# Patient Record
Sex: FEMALE | Race: WHITE | NOT HISPANIC OR LATINO
[De-identification: names, ages, dates, MRNs, and addresses within clinical notes are randomized per-mention and may not be internally consistent; named-entity substitution may affect disease eponyms.]

---

## 2017-01-25 ENCOUNTER — APPOINTMENT (OUTPATIENT)
Dept: GASTROENTEROLOGY | Facility: CLINIC | Age: 82
End: 2017-01-25

## 2017-01-25 VITALS
WEIGHT: 100 LBS | SYSTOLIC BLOOD PRESSURE: 122 MMHG | HEART RATE: 87 BPM | TEMPERATURE: 97.7 F | HEIGHT: 60 IN | OXYGEN SATURATION: 97 % | BODY MASS INDEX: 19.63 KG/M2 | DIASTOLIC BLOOD PRESSURE: 70 MMHG

## 2017-01-25 DIAGNOSIS — R10.30 LOWER ABDOMINAL PAIN, UNSPECIFIED: ICD-10-CM

## 2017-02-28 ENCOUNTER — APPOINTMENT (OUTPATIENT)
Dept: OPHTHALMOLOGY | Facility: CLINIC | Age: 82
End: 2017-02-28

## 2017-03-15 ENCOUNTER — RESULT REVIEW (OUTPATIENT)
Age: 82
End: 2017-03-15

## 2017-03-16 ENCOUNTER — APPOINTMENT (OUTPATIENT)
Dept: OPHTHALMOLOGY | Facility: CLINIC | Age: 82
End: 2017-03-16

## 2017-04-05 ENCOUNTER — APPOINTMENT (OUTPATIENT)
Dept: GASTROENTEROLOGY | Facility: CLINIC | Age: 82
End: 2017-04-05

## 2017-04-24 ENCOUNTER — APPOINTMENT (OUTPATIENT)
Dept: GASTROENTEROLOGY | Facility: CLINIC | Age: 82
End: 2017-04-24

## 2017-04-24 VITALS
OXYGEN SATURATION: 99 % | WEIGHT: 98 LBS | SYSTOLIC BLOOD PRESSURE: 130 MMHG | BODY MASS INDEX: 19.24 KG/M2 | HEART RATE: 78 BPM | DIASTOLIC BLOOD PRESSURE: 78 MMHG | HEIGHT: 60 IN | TEMPERATURE: 98.6 F

## 2017-07-24 ENCOUNTER — APPOINTMENT (OUTPATIENT)
Dept: GASTROENTEROLOGY | Facility: CLINIC | Age: 82
End: 2017-07-24

## 2017-07-24 VITALS
HEIGHT: 60 IN | BODY MASS INDEX: 19.24 KG/M2 | WEIGHT: 98 LBS | OXYGEN SATURATION: 98 % | HEART RATE: 78 BPM | SYSTOLIC BLOOD PRESSURE: 140 MMHG | DIASTOLIC BLOOD PRESSURE: 70 MMHG | TEMPERATURE: 97.8 F

## 2017-07-24 DIAGNOSIS — Z87.19 PERSONAL HISTORY OF OTHER DISEASES OF THE DIGESTIVE SYSTEM: ICD-10-CM

## 2017-07-24 DIAGNOSIS — R19.4 CHANGE IN BOWEL HABIT: ICD-10-CM

## 2017-07-24 RX ORDER — WHEAT DEXTRIN 3 G/4 G
POWDER (GRAM) ORAL
Qty: 1 | Refills: 5 | Status: ACTIVE | OUTPATIENT
Start: 2017-07-24

## 2017-08-14 ENCOUNTER — APPOINTMENT (OUTPATIENT)
Dept: GASTROENTEROLOGY | Facility: CLINIC | Age: 82
End: 2017-08-14

## 2017-08-23 ENCOUNTER — OUTPATIENT (OUTPATIENT)
Dept: OUTPATIENT SERVICES | Facility: HOSPITAL | Age: 82
LOS: 1 days | Discharge: HOME | End: 2017-08-23

## 2017-08-23 ENCOUNTER — APPOINTMENT (OUTPATIENT)
Dept: GASTROENTEROLOGY | Facility: HOSPITAL | Age: 82
End: 2017-08-23
Payer: MEDICARE

## 2017-08-23 DIAGNOSIS — K62.5 HEMORRHAGE OF ANUS AND RECTUM: ICD-10-CM

## 2017-08-23 DIAGNOSIS — H25.019 CORTICAL AGE-RELATED CATARACT, UNSPECIFIED EYE: Chronic | ICD-10-CM

## 2017-08-23 PROCEDURE — 45380 COLONOSCOPY AND BIOPSY: CPT

## 2017-09-07 ENCOUNTER — APPOINTMENT (OUTPATIENT)
Dept: OPHTHALMOLOGY | Facility: CLINIC | Age: 82
End: 2017-09-07
Payer: MEDICARE

## 2017-09-07 PROCEDURE — 92014 COMPRE OPH EXAM EST PT 1/>: CPT

## 2017-09-07 PROCEDURE — 92134 CPTRZ OPH DX IMG PST SGM RTA: CPT

## 2017-09-26 ENCOUNTER — APPOINTMENT (OUTPATIENT)
Dept: OPHTHALMOLOGY | Facility: CLINIC | Age: 82
End: 2017-09-26
Payer: MEDICARE

## 2017-09-26 DIAGNOSIS — H00.11 CHALAZION RIGHT UPPER EYELID: ICD-10-CM

## 2017-09-26 PROCEDURE — 92012 INTRM OPH EXAM EST PATIENT: CPT

## 2017-09-26 RX ORDER — TOBRAMYCIN AND DEXAMETHASONE 3; 1 MG/G; MG/G
0.3-0.1 OINTMENT OPHTHALMIC
Qty: 1 | Refills: 1 | Status: ACTIVE | COMMUNITY
Start: 2017-09-26 | End: 1900-01-01

## 2017-09-26 RX ORDER — NEOMYCIN AND POLYMYXIN B SULFATES AND DEXAMETHASONE 3.5; 10000; 1 MG/G; [IU]/G; MG/G
3.5-10000-0.1 OINTMENT OPHTHALMIC TWICE DAILY
Qty: 1 | Refills: 0 | Status: ACTIVE | COMMUNITY
Start: 2017-09-26 | End: 1900-01-01

## 2017-10-02 ENCOUNTER — APPOINTMENT (OUTPATIENT)
Dept: GASTROENTEROLOGY | Facility: CLINIC | Age: 82
End: 2017-10-02
Payer: MEDICARE

## 2017-10-02 VITALS
HEART RATE: 94 BPM | HEIGHT: 60 IN | TEMPERATURE: 98.5 F | WEIGHT: 100 LBS | SYSTOLIC BLOOD PRESSURE: 158 MMHG | BODY MASS INDEX: 19.63 KG/M2 | DIASTOLIC BLOOD PRESSURE: 82 MMHG | OXYGEN SATURATION: 98 %

## 2017-10-02 DIAGNOSIS — K64.8 OTHER HEMORRHOIDS: ICD-10-CM

## 2017-10-02 DIAGNOSIS — R97.8 OTHER ABNORMAL TUMOR MARKERS: ICD-10-CM

## 2017-10-02 DIAGNOSIS — K62.5 HEMORRHAGE OF ANUS AND RECTUM: ICD-10-CM

## 2017-10-02 PROCEDURE — 99214 OFFICE O/P EST MOD 30 MIN: CPT

## 2018-02-21 ENCOUNTER — OUTPATIENT (OUTPATIENT)
Dept: OUTPATIENT SERVICES | Facility: HOSPITAL | Age: 83
LOS: 1 days | End: 2018-02-21
Payer: MEDICARE

## 2018-02-21 VITALS
HEART RATE: 80 BPM | DIASTOLIC BLOOD PRESSURE: 80 MMHG | RESPIRATION RATE: 16 BRPM | OXYGEN SATURATION: 98 % | WEIGHT: 97 LBS | HEIGHT: 59.5 IN | SYSTOLIC BLOOD PRESSURE: 140 MMHG | TEMPERATURE: 98 F

## 2018-02-21 DIAGNOSIS — I44.7 LEFT BUNDLE-BRANCH BLOCK, UNSPECIFIED: ICD-10-CM

## 2018-02-21 DIAGNOSIS — K62.1 RECTAL POLYP: ICD-10-CM

## 2018-02-21 DIAGNOSIS — H25.019 CORTICAL AGE-RELATED CATARACT, UNSPECIFIED EYE: Chronic | ICD-10-CM

## 2018-02-21 DIAGNOSIS — K62.0 ANAL POLYP: ICD-10-CM

## 2018-02-21 PROCEDURE — 93010 ELECTROCARDIOGRAM REPORT: CPT

## 2018-02-21 NOTE — H&P PST ADULT - PSH
Cataract cortical, senile  s/p extraction right eye 12/13  History of abdominal hysterectomy  1970  History of appendectomy  1939  S/P breast biopsy  Right breast benign cyst removed.  S/P thyroidectomy  1981

## 2018-02-21 NOTE — H&P PST ADULT - GASTROINTESTINAL COMMENTS
"I did have a hemorrhage with my hemorrhoid around January 8th, that stopped but I'm oozing something sometimes it's a little pink"

## 2018-02-21 NOTE — H&P PST ADULT - PROBLEM SELECTOR PLAN 2
Pt. is scheduled to see her Cardiologist 2/23/18.  Pt. given clearance form to give to Cardiologist. Pt. is scheduled to see her Cardiologist 2/23/18.  Pt. given clearance form to give to Cardiologist.  Had office fax over old EKG from 2/2017 for comparison.  Spoke with Dr. Ludwig.  Larissa Crowell at the Cardiologist office that pt's. scheduled visit for 2/23/18 also needs to be a cardiac clearance visit.  Requested Cardiologist fax number to send over EKG from today, 2/21/18.

## 2018-02-21 NOTE — H&P PST ADULT - NSANTHOSAYNRD_GEN_A_CORE
No. LOVE screening performed.  STOP BANG Legend: 0-2 = LOW Risk; 3-4 = INTERMEDIATE Risk; 5-8 = HIGH Risk

## 2018-02-21 NOTE — H&P PST ADULT - ANESTHESIA, PREVIOUS REACTION, PROFILE
none/pt's. 72 yo mother had a stroke in the recovery room after having a kidney tumor removed, she passed away 6 weeks later

## 2018-02-21 NOTE — H&P PST ADULT - PMH
Anemia    Bleeding hemorrhoid    Bundle branch block, left    H/O: hypothyroidism    Hypercholesteremia    Left ovarian cyst    Lupus    Meniere disease    Thyroid ca

## 2018-03-01 ENCOUNTER — OUTPATIENT (OUTPATIENT)
Dept: OUTPATIENT SERVICES | Facility: HOSPITAL | Age: 83
LOS: 1 days | Discharge: HOME | End: 2018-03-01
Payer: MEDICARE

## 2018-03-01 ENCOUNTER — RESULT REVIEW (OUTPATIENT)
Age: 83
End: 2018-03-01

## 2018-03-01 VITALS
TEMPERATURE: 98 F | WEIGHT: 97 LBS | SYSTOLIC BLOOD PRESSURE: 146 MMHG | RESPIRATION RATE: 16 BRPM | OXYGEN SATURATION: 98 % | DIASTOLIC BLOOD PRESSURE: 54 MMHG | HEART RATE: 82 BPM | HEIGHT: 59.5 IN

## 2018-03-01 VITALS
SYSTOLIC BLOOD PRESSURE: 122 MMHG | DIASTOLIC BLOOD PRESSURE: 70 MMHG | OXYGEN SATURATION: 100 % | HEART RATE: 74 BPM | RESPIRATION RATE: 15 BRPM

## 2018-03-01 DIAGNOSIS — H25.019 CORTICAL AGE-RELATED CATARACT, UNSPECIFIED EYE: Chronic | ICD-10-CM

## 2018-03-01 DIAGNOSIS — K62.0 ANAL POLYP: ICD-10-CM

## 2018-03-01 PROCEDURE — 88305 TISSUE EXAM BY PATHOLOGIST: CPT | Mod: 26

## 2018-03-01 RX ORDER — OXYCODONE AND ACETAMINOPHEN 5; 325 MG/1; MG/1
1 TABLET ORAL
Qty: 40 | Refills: 0
Start: 2018-03-01

## 2018-03-01 NOTE — ASU DISCHARGE PLAN (ADULT/PEDIATRIC). - NOTIFY
Pain not relieved by Medications/Fever greater than 101/Persistent Nausea and Vomiting/Bleeding that does not stop

## 2018-03-01 NOTE — BRIEF OPERATIVE NOTE - PROCEDURE
Rectal polypectomy  03/01/2018  Transanal excsion of rectal polyp with excision of rectal mucosa prolapse  Active  DLOMASNEY <<-----Click on this checkbox to enter Procedure

## 2018-03-07 ENCOUNTER — TRANSCRIPTION ENCOUNTER (OUTPATIENT)
Age: 83
End: 2018-03-07

## 2018-04-03 ENCOUNTER — APPOINTMENT (OUTPATIENT)
Dept: OPHTHALMOLOGY | Facility: CLINIC | Age: 83
End: 2018-04-03
Payer: MEDICARE

## 2018-04-03 DIAGNOSIS — Z79.899 OTHER LONG TERM (CURRENT) DRUG THERAPY: ICD-10-CM

## 2018-04-03 PROCEDURE — 92134 CPTRZ OPH DX IMG PST SGM RTA: CPT

## 2018-04-03 PROCEDURE — 92014 COMPRE OPH EXAM EST PT 1/>: CPT

## 2018-06-18 ENCOUNTER — APPOINTMENT (OUTPATIENT)
Dept: OPHTHALMOLOGY | Facility: CLINIC | Age: 83
End: 2018-06-18
Payer: MEDICARE

## 2018-06-18 PROCEDURE — 92060 SENSORIMOTOR EXAMINATION: CPT

## 2018-06-18 PROCEDURE — 92012 INTRM OPH EXAM EST PATIENT: CPT

## 2018-09-07 PROBLEM — K64.9 UNSPECIFIED HEMORRHOIDS: Chronic | Status: ACTIVE | Noted: 2018-02-21

## 2018-09-07 PROBLEM — N83.202 UNSPECIFIED OVARIAN CYST, LEFT SIDE: Chronic | Status: ACTIVE | Noted: 2018-02-21

## 2018-09-26 ENCOUNTER — APPOINTMENT (OUTPATIENT)
Dept: GASTROENTEROLOGY | Facility: CLINIC | Age: 83
End: 2018-09-26
Payer: MEDICARE

## 2018-09-26 VITALS
HEART RATE: 86 BPM | OXYGEN SATURATION: 97 % | SYSTOLIC BLOOD PRESSURE: 122 MMHG | TEMPERATURE: 98 F | HEIGHT: 60 IN | RESPIRATION RATE: 17 BRPM | DIASTOLIC BLOOD PRESSURE: 83 MMHG | WEIGHT: 97 LBS | BODY MASS INDEX: 19.04 KG/M2

## 2018-09-26 DIAGNOSIS — K29.50 UNSPECIFIED CHRONIC GASTRITIS W/OUT BLEEDING: ICD-10-CM

## 2018-09-26 PROCEDURE — 99214 OFFICE O/P EST MOD 30 MIN: CPT

## 2018-09-26 RX ORDER — ALUMINUM HYDROXIDE AND MAGNESIUM TRISILICATE 80; 14.2 MG/1; MG/1
80-14.2 TABLET, CHEWABLE ORAL
Qty: 30 | Refills: 1 | Status: ACTIVE | OUTPATIENT
Start: 2018-09-26

## 2019-03-05 ENCOUNTER — RECORD ABSTRACTING (OUTPATIENT)
Age: 84
End: 2019-03-05

## 2019-03-05 ENCOUNTER — APPOINTMENT (OUTPATIENT)
Dept: SURGICAL ONCOLOGY | Facility: CLINIC | Age: 84
End: 2019-03-05
Payer: MEDICARE

## 2019-03-05 VITALS
SYSTOLIC BLOOD PRESSURE: 144 MMHG | BODY MASS INDEX: 19.44 KG/M2 | WEIGHT: 99 LBS | HEART RATE: 76 BPM | OXYGEN SATURATION: 97 % | HEIGHT: 60 IN | DIASTOLIC BLOOD PRESSURE: 82 MMHG | RESPIRATION RATE: 16 BRPM

## 2019-03-05 DIAGNOSIS — Z85.828 PERSONAL HISTORY OF OTHER MALIGNANT NEOPLASM OF SKIN: ICD-10-CM

## 2019-03-05 DIAGNOSIS — Z86.79 PERSONAL HISTORY OF OTHER DISEASES OF THE CIRCULATORY SYSTEM: ICD-10-CM

## 2019-03-05 DIAGNOSIS — Z87.19 PERSONAL HISTORY OF OTHER DISEASES OF THE DIGESTIVE SYSTEM: ICD-10-CM

## 2019-03-05 DIAGNOSIS — R92.8 OTHER ABNORMAL AND INCONCLUSIVE FINDINGS ON DIAGNOSTIC IMAGING OF BREAST: ICD-10-CM

## 2019-03-05 PROCEDURE — 99203 OFFICE O/P NEW LOW 30 MIN: CPT

## 2019-03-05 NOTE — HISTORY OF PRESENT ILLNESS
[de-identified] : 84 y/o female patient presents for a follow up visit due to findings on mammogram. She does have pain on her breasts due to pressure. She denies any nipple discharge.\par FMHx of Breast CA from maternal aunt.\par PMHx of RT bundle branch block fluid around the heart, skin cancers, cancer thyroidectomy, lupus, and two ovary cysts. In addition to HTN which is controlled with medication and is in remission with lupus. \par \par \par MMG 19: Skin retraction LT nipple region 4:00 location on clinical exam with normal sonogram. 1.2 cm echogenic mass in the LT breast 8:00 location 2 cm from the nipple suggestive of area of fat necrosis. Recommend 6 month f/u sonogram. Further management of the skin changes in the LT breast to be dependent on clincal exam. Probably benign. \par \par Allergies: Penicillin, Aspirin, Shrimp

## 2019-03-05 NOTE — ASSESSMENT
[FreeTextEntry1] : imp:\par Sonographic abnormality\par No palpable mass found on PE\par \par plan:\par Will have films reviewed regarding 6 month f/u recommendation versus biopsy

## 2019-03-05 NOTE — PHYSICAL EXAM
[Normal] : supple, no neck mass and thyroid not enlarged [Normal Neck Lymph Nodes] : normal neck lymph nodes  [Normal Groin Lymph Nodes] : normal groin lymph nodes [Normal] : oriented to person, place and time, with appropriate affect [FreeTextEntry1] : I, James Mccloud was present for the physical exam\par \par \par \par  [de-identified] : Normal S1, S2. Regular rate and rhythm\par \par  [de-identified] : No suspicious lesions identified at 8:00 LT breast 2 cm from the nipple. [de-identified] : Clear breath sounds bilaterally, normal respiratory effort\par \par

## 2019-03-05 NOTE — CONSULT LETTER
[Dear  ___] : Dear  [unfilled], [Consult Letter:] : I had the pleasure of evaluating your patient, [unfilled]. [Please see my note below.] : Please see my note below. [Consult Closing:] : Thank you very much for allowing me to participate in the care of this patient.  If you have any questions, please do not hesitate to contact me. [Sincerely,] : Sincerely, [FreeTextEntry2] : [facility], [address]\par [place] [id number] [FreeTextEntry3] : Rojas Lopez M.D.\par \par \par \par

## 2019-04-04 ENCOUNTER — APPOINTMENT (OUTPATIENT)
Dept: OPHTHALMOLOGY | Facility: CLINIC | Age: 84
End: 2019-04-04

## 2019-04-09 ENCOUNTER — APPOINTMENT (OUTPATIENT)
Dept: OPHTHALMOLOGY | Facility: CLINIC | Age: 84
End: 2019-04-09
Payer: MEDICARE

## 2019-04-09 DIAGNOSIS — H35.40 UNSPECIFIED PERIPHERAL RETINAL DEGENERATION: ICD-10-CM

## 2019-04-09 DIAGNOSIS — H44.23 DEGENERATIVE MYOPIA, BILATERAL: ICD-10-CM

## 2019-04-09 PROCEDURE — 92226: CPT | Mod: LT

## 2019-04-09 PROCEDURE — 92014 COMPRE OPH EXAM EST PT 1/>: CPT

## 2019-04-09 PROCEDURE — 92134 CPTRZ OPH DX IMG PST SGM RTA: CPT

## 2019-10-07 ENCOUNTER — APPOINTMENT (OUTPATIENT)
Dept: GASTROENTEROLOGY | Facility: CLINIC | Age: 84
End: 2019-10-07
Payer: MEDICARE

## 2019-10-07 VITALS
DIASTOLIC BLOOD PRESSURE: 70 MMHG | BODY MASS INDEX: 18.46 KG/M2 | TEMPERATURE: 98.6 F | SYSTOLIC BLOOD PRESSURE: 120 MMHG | HEIGHT: 60 IN | HEART RATE: 82 BPM | WEIGHT: 94 LBS | OXYGEN SATURATION: 98 %

## 2019-10-07 DIAGNOSIS — R05 COUGH: ICD-10-CM

## 2019-10-07 PROCEDURE — 99214 OFFICE O/P EST MOD 30 MIN: CPT

## 2019-10-07 RX ORDER — PANTOPRAZOLE 40 MG/1
40 TABLET, DELAYED RELEASE ORAL
Qty: 30 | Refills: 1 | Status: ACTIVE | COMMUNITY
Start: 2017-04-24

## 2019-10-07 RX ORDER — METOCLOPRAMIDE 10 MG/1
10 TABLET ORAL
Qty: 15 | Refills: 0 | Status: DISCONTINUED | COMMUNITY
Start: 2018-09-26 | End: 2019-10-07

## 2019-10-07 RX ORDER — DEXLANSOPRAZOLE 60 MG/1
60 CAPSULE, DELAYED RELEASE ORAL
Qty: 30 | Refills: 0 | Status: DISCONTINUED | COMMUNITY
Start: 2018-04-04 | End: 2019-10-07

## 2019-10-07 NOTE — HISTORY OF PRESENT ILLNESS
[FreeTextEntry1] : Patient continues to complain of a nocturnal cough. This is associated with some burning in the throat. It seems to be relieved by Mylanta at night. There is no associated heartburn or regurgitation. She denies any dysphagia. She is on pantoprazole in AM and off H2 blockers. Patient does have a history of gastric polyps some of which have been removed and will have been benign.\par

## 2019-10-07 NOTE — ASSESSMENT
[FreeTextEntry1] : Patient with benign gastric polyps. She does have nocturnal cough frequently. She will change present to present from taking it in the morning to taking it at night.\par An abdominal sonogram will also be ordered.

## 2019-10-07 NOTE — PHYSICAL EXAM
[General Appearance - In No Acute Distress] : in no acute distress [General Appearance - Alert] : alert [PERRL With Normal Accommodation] : pupils were equal in size, round, and reactive to light [Sclera] : the sclera and conjunctiva were normal [Extraocular Movements] : extraocular movements were intact [Outer Ear] : the ears and nose were normal in appearance [Oropharynx] : the oropharynx was normal [Neck Appearance] : the appearance of the neck was normal [Neck Cervical Mass (___cm)] : no neck mass was observed [Jugular Venous Distention Increased] : there was no jugular-venous distention [Thyroid Nodule] : there were no palpable thyroid nodules [Thyroid Diffuse Enlargement] : the thyroid was not enlarged [Auscultation Breath Sounds / Voice Sounds] : lungs were clear to auscultation bilaterally [Heart Rate And Rhythm] : heart rate was normal and rhythm regular [Heart Sounds Gallop] : no gallops [Heart Sounds] : normal S1 and S2 [Murmurs] : no murmurs [Heart Sounds Pericardial Friction Rub] : no pericardial rub [Bowel Sounds] : normal bowel sounds [Abdomen Soft] : soft [Abdomen Tenderness] : non-tender [Abdomen Mass (___ Cm)] : no abdominal mass palpated [] : no hepato-splenomegaly [No CVA Tenderness] : no ~M costovertebral angle tenderness [Nail Clubbing] : no clubbing  or cyanosis of the fingernails [Abnormal Walk] : normal gait [No Spinal Tenderness] : no spinal tenderness [Motor Tone] : muscle strength and tone were normal [Musculoskeletal - Swelling] : no joint swelling seen [Oriented To Time, Place, And Person] : oriented to person, place, and time [Impaired Insight] : insight and judgment were intact [Affect] : the affect was normal

## 2019-11-06 ENCOUNTER — APPOINTMENT (OUTPATIENT)
Dept: OPHTHALMOLOGY | Facility: CLINIC | Age: 84
End: 2019-11-06

## 2019-12-11 ENCOUNTER — APPOINTMENT (OUTPATIENT)
Dept: OPHTHALMOLOGY | Facility: CLINIC | Age: 84
End: 2019-12-11

## 2020-03-09 ENCOUNTER — APPOINTMENT (OUTPATIENT)
Dept: GASTROENTEROLOGY | Facility: CLINIC | Age: 85
End: 2020-03-09

## 2020-11-23 ENCOUNTER — APPOINTMENT (OUTPATIENT)
Dept: GASTROENTEROLOGY | Facility: CLINIC | Age: 85
End: 2020-11-23
Payer: MEDICARE

## 2020-11-23 VITALS
DIASTOLIC BLOOD PRESSURE: 80 MMHG | OXYGEN SATURATION: 98 % | HEART RATE: 78 BPM | BODY MASS INDEX: 18.06 KG/M2 | WEIGHT: 92 LBS | SYSTOLIC BLOOD PRESSURE: 130 MMHG | TEMPERATURE: 98.1 F | HEIGHT: 60 IN

## 2020-11-23 DIAGNOSIS — K31.7 POLYP OF STOMACH AND DUODENUM: ICD-10-CM

## 2020-11-23 DIAGNOSIS — Z87.19 PERSONAL HISTORY OF OTHER DISEASES OF THE DIGESTIVE SYSTEM: ICD-10-CM

## 2020-11-23 DIAGNOSIS — K21.9 GASTRO-ESOPHAGEAL REFLUX DISEASE W/OUT ESOPHAGITIS: ICD-10-CM

## 2020-11-23 PROCEDURE — 99214 OFFICE O/P EST MOD 30 MIN: CPT

## 2020-11-23 RX ORDER — DOXYCYCLINE HYCLATE 50 MG/1
50 CAPSULE ORAL
Qty: 30 | Refills: 0 | Status: ACTIVE | COMMUNITY
Start: 2020-11-11

## 2020-11-23 RX ORDER — NEOMYCIN SULFATE, POLYMYXIN B SULFATE AND DEXAMETHASONE 3.5; 10000; 1 MG/ML; [USP'U]/ML; MG/ML
3.5-10000-0.1 SUSPENSION OPHTHALMIC
Qty: 5 | Refills: 0 | Status: ACTIVE | COMMUNITY
Start: 2020-09-10

## 2020-11-23 NOTE — ASSESSMENT
[FreeTextEntry1] : Patient with history of gastric polyps.  Several were removed.  These were mostly hyperplastic and inflammatory polyps with some intestinal metaplasia.  She did have a polyp that was biopsied in 2015 just below the GE junction there was a hyperplastic polyp with some intestinal metaplasia.  Patient has no symptoms of dysphagia.  Her weight is stable.  Her blood work is good.\par She continues to have burning in her throat which mostly occurs at night and is relieved with Mylanta which she will continue.

## 2020-11-23 NOTE — HISTORY OF PRESENT ILLNESS
[FreeTextEntry1] : Patient continues to complain of a nocturnal burning in throat. It seems to be relieved by Mylanta at night. There is no associated heartburn or regurgitation. She denies any dysphagia. She is on pantoprazole in AM and off H2 blockers. Takes Carafate 2-3 times per day.\par Patient does have a history of gastric polyps some of which have been removed and  have been benign hyperplastic and inflammatory polyps..\par

## 2021-01-14 ENCOUNTER — NON-APPOINTMENT (OUTPATIENT)
Age: 86
End: 2021-01-14

## 2021-02-03 ENCOUNTER — APPOINTMENT (OUTPATIENT)
Dept: GASTROENTEROLOGY | Facility: CLINIC | Age: 86
End: 2021-02-03

## 2021-04-13 ENCOUNTER — APPOINTMENT (OUTPATIENT)
Dept: SURGICAL ONCOLOGY | Facility: CLINIC | Age: 86
End: 2021-04-13

## 2022-04-20 ENCOUNTER — APPOINTMENT (OUTPATIENT)
Dept: GASTROENTEROLOGY | Facility: CLINIC | Age: 87
End: 2022-04-20
Payer: MEDICARE

## 2022-04-20 VITALS
HEIGHT: 61 IN | DIASTOLIC BLOOD PRESSURE: 85 MMHG | WEIGHT: 95 LBS | BODY MASS INDEX: 17.94 KG/M2 | HEART RATE: 69 BPM | OXYGEN SATURATION: 100 % | SYSTOLIC BLOOD PRESSURE: 137 MMHG

## 2022-04-20 DIAGNOSIS — R10.30 LOWER ABDOMINAL PAIN, UNSPECIFIED: ICD-10-CM

## 2022-04-20 PROCEDURE — 99204 OFFICE O/P NEW MOD 45 MIN: CPT

## 2022-04-20 NOTE — PHYSICAL EXAM
[General Appearance - Alert] : alert [General Appearance - In No Acute Distress] : in no acute distress [Sclera] : the sclera and conjunctiva were normal [Outer Ear] : the ears and nose were normal in appearance [Neck Appearance] : the appearance of the neck was normal [Respiration, Rhythm And Depth] : normal respiratory rhythm and effort [Auscultation Breath Sounds / Voice Sounds] : lungs were clear to auscultation bilaterally [Apical Impulse] : the apical impulse was normal [Heart Rate And Rhythm] : heart rate was normal and rhythm regular [Heart Sounds] : normal S1 and S2 [Heart Sounds Gallop] : no gallops [Murmurs] : no murmurs [Bowel Sounds] : normal bowel sounds [Abdomen Soft] : soft [Abdomen Tenderness] : non-tender [] : no hepato-splenomegaly [Abdomen Mass (___ Cm)] : no abdominal mass palpated [FreeTextEntry1] : well healed midline and LLQ surgical scars [Involuntary Movements] : no involuntary movements were seen [Skin Color & Pigmentation] : normal skin color and pigmentation [No Focal Deficits] : no focal deficits [Impaired Insight] : insight and judgment were intact [Oriented To Time, Place, And Person] : oriented to person, place, and time [Affect] : the affect was normal

## 2022-04-20 NOTE — ASSESSMENT
[FreeTextEntry1] : ANISA LUCIANO is a 86 year old female with a history of GERD, gastric polyps, thyroid cancer s/p resection, lupus with pericardial effusions currently in remission, chronic pancreatitis/pancreatic insufficiency, appendectomy, hysterectomy for fibroids here for evaluation of pancreatic insufficiency and abdominal pain\par \par #Lower abdominal discomfort x several months - nonspecific symptoms, not related to food or BMs, not likely to be related to pancreatic insufficiency \par # Pancreatic insufficiency (by report) with evidence of chronic pancreatitis on previous MRI and EUS, off PERT (do not know dose)\par # <5mm simple appearing pancreatic cysts, possibly side branch IPMNs vs sequelae of chronic pancreatitis\par \par Recs:\par - discussed that for history of EPI, will plan for a formal diagnostic study with fecal elastase (as well as evaluate basic bloodwork, r/o Celiac, r/o IBD) to determine how severe her insufficiency may be\par - as Creon appears to be causing her side effects will hold of on restarting PERT until labwork back\par - last imaging of pancreas was 2/21 - will obtain CT abd/pelvis pancreas protocol to evaluate both pancreatic cysts as well as lower abdomen and pelvis in order to workup this discomfort\par - patient does not want to try Levsin/other antispasmodic for symptom relief - prefers to stick with Tylenol and heating pads\par - RTO in 4-6 weeks and then pending above data will present at cyst clinic as needed, etc

## 2022-04-27 ENCOUNTER — NON-APPOINTMENT (OUTPATIENT)
Age: 87
End: 2022-04-27

## 2022-04-29 LAB
ALBUMIN SERPL ELPH-MCNC: 4.9 G/DL
ALP BLD-CCNC: 65 U/L
ALT SERPL-CCNC: 16 U/L
AMYLASE/CREAT SERPL: 186 U/L
ANION GAP SERPL CALC-SCNC: 13 MMOL/L
AST SERPL-CCNC: 18 U/L
BASOPHILS # BLD AUTO: 0.03 K/UL
BASOPHILS NFR BLD AUTO: 0.5 %
BILIRUB SERPL-MCNC: 0.3 MG/DL
BUN SERPL-MCNC: 22 MG/DL
CALCIUM SERPL-MCNC: 9.8 MG/DL
CALPROTECTIN FECAL: 30 UG/G
CANCER AG19-9 SERPL-ACNC: 38 U/ML
CEA SERPL-MCNC: 3.9 NG/ML
CHLORIDE SERPL-SCNC: 99 MMOL/L
CO2 SERPL-SCNC: 28 MMOL/L
CREAT SERPL-MCNC: 0.72 MG/DL
EGFR: 82 ML/MIN/1.73M2
EOSINOPHIL # BLD AUTO: 0.07 K/UL
EOSINOPHIL NFR BLD AUTO: 1.1 %
GLUCOSE SERPL-MCNC: 95 MG/DL
HCT VFR BLD CALC: 45.1 %
HGB BLD-MCNC: 14.6 G/DL
IGA SER QL IEP: 165 MG/DL
IMM GRANULOCYTES NFR BLD AUTO: 0.3 %
INR PPP: 1.03 RATIO
LPL SERPL-CCNC: 71 U/L
LYMPHOCYTES # BLD AUTO: 0.86 K/UL
LYMPHOCYTES NFR BLD AUTO: 13.2 %
MAN DIFF?: NORMAL
MCHC RBC-ENTMCNC: 29.3 PG
MCHC RBC-ENTMCNC: 32.4 GM/DL
MCV RBC AUTO: 90.6 FL
MONOCYTES # BLD AUTO: 0.5 K/UL
MONOCYTES NFR BLD AUTO: 7.7 %
NEUTROPHILS # BLD AUTO: 5.02 K/UL
NEUTROPHILS NFR BLD AUTO: 77.2 %
PANCREATIC ELASTASE, FECAL: 142
PLATELET # BLD AUTO: 244 K/UL
POTASSIUM SERPL-SCNC: 3.9 MMOL/L
PROT SERPL-MCNC: 7.9 G/DL
PT BLD: 12 SEC
RBC # BLD: 4.98 M/UL
RBC # FLD: 14.4 %
SODIUM SERPL-SCNC: 141 MMOL/L
TTG IGA SER IA-ACNC: <1.2 U/ML
TTG IGA SER-ACNC: NEGATIVE
TTG IGG SER IA-ACNC: 5.7 U/ML
TTG IGG SER IA-ACNC: NEGATIVE
WBC # FLD AUTO: 6.5 K/UL

## 2022-05-09 ENCOUNTER — APPOINTMENT (OUTPATIENT)
Dept: GASTROENTEROLOGY | Facility: CLINIC | Age: 87
End: 2022-05-09

## 2022-05-17 ENCOUNTER — APPOINTMENT (OUTPATIENT)
Dept: CT IMAGING | Facility: CLINIC | Age: 87
End: 2022-05-17

## 2022-05-25 ENCOUNTER — NON-APPOINTMENT (OUTPATIENT)
Age: 87
End: 2022-05-25

## 2022-06-08 ENCOUNTER — APPOINTMENT (OUTPATIENT)
Dept: GASTROENTEROLOGY | Facility: CLINIC | Age: 87
End: 2022-06-08
Payer: MEDICARE

## 2022-06-08 VITALS
OXYGEN SATURATION: 98 % | BODY MASS INDEX: 17.37 KG/M2 | WEIGHT: 92 LBS | SYSTOLIC BLOOD PRESSURE: 123 MMHG | HEIGHT: 61 IN | HEART RATE: 83 BPM | DIASTOLIC BLOOD PRESSURE: 79 MMHG

## 2022-06-08 DIAGNOSIS — R10.9 UNSPECIFIED ABDOMINAL PAIN: ICD-10-CM

## 2022-06-08 DIAGNOSIS — K86.89 OTHER SPECIFIED DISEASES OF PANCREAS: ICD-10-CM

## 2022-06-08 PROCEDURE — 99214 OFFICE O/P EST MOD 30 MIN: CPT

## 2022-06-08 NOTE — ASSESSMENT
[FreeTextEntry1] : \par ANISA LUCIANO is a 86 year old female with a history of GERD, gastric polyps, thyroid cancer s/p resection, lupus with pericardial effusions currently in remission, chronic pancreatitis/pancreatic insufficiency, appendectomy, hysterectomy for fibroids here for followup\par \par #Lower abdominal/pelvic pain - unclear cause, CT negative, went to GYN who did not find etiology on pelvic exam\par # CT with ileal bone lesions - question MM?\par # <5mm pancreatic cyst\par #  Pancreatic insufficiency now on Creon two tabs/ dinner\par \par Recs:\par - discussed referral to hematology for eval for MM just to be safe, patient would like to proceed \par - Continue Creon with dinner and advised that if she has a substantial meal or snack before dinner to take 1 tab with that as well\par - Advised trying to increase calories with protein supplements or shakes (like Glucerna or Boost)\par - If cannot fill Bentyl will try to find other antispasmodic for PRN abdominopelvic pain; also advised heating pads as needed\par - follow up with me at end of year, will discuss repeat imaging need for cyst surveillance (1 year imaging) if patient interested in continuing to be surveilled

## 2022-06-08 NOTE — PHYSICAL EXAM
[General Appearance - Alert] : alert [General Appearance - In No Acute Distress] : in no acute distress [Sclera] : the sclera and conjunctiva were normal [Outer Ear] : the ears and nose were normal in appearance [Neck Appearance] : the appearance of the neck was normal [Respiration, Rhythm And Depth] : normal respiratory rhythm and effort [Auscultation Breath Sounds / Voice Sounds] : lungs were clear to auscultation bilaterally [Apical Impulse] : the apical impulse was normal [Heart Rate And Rhythm] : heart rate was normal and rhythm regular [Heart Sounds] : normal S1 and S2 [Heart Sounds Gallop] : no gallops [Murmurs] : no murmurs [Bowel Sounds] : normal bowel sounds [Abdomen Soft] : soft [Abdomen Tenderness] : non-tender [] : no hepato-splenomegaly [Abdomen Mass (___ Cm)] : no abdominal mass palpated [FreeTextEntry1] : well healed midline and LLQ surgical scars [Involuntary Movements] : no involuntary movements were seen [Skin Color & Pigmentation] : normal skin color and pigmentation [No Focal Deficits] : no focal deficits [Oriented To Time, Place, And Person] : oriented to person, place, and time [Impaired Insight] : insight and judgment were intact [Affect] : the affect was normal

## 2022-06-08 NOTE — HISTORY OF PRESENT ILLNESS
[de-identified] : ANISA LUCIANO is a 86 year old female with a history of GERD, gastric polyps, thyroid cancer s/p resection, lupus with pericardial effusions currently in remission, chronic pancreatitis/pancreatic insufficiency, appendectomy, hysterectomy for fibroids\par \par \par She presents today for followup\par Since last visit she underwent panc protocol CT that did not show any concerning intraabdominal or pancreatic pathology (aside from small known cyst).  Katia lesions mentioned but reviewed with radiologist who felt this might be an overcall\par Initially discussed with patient who deferred referral to hematology but now wonders if her lower abdominal/pelvic pain may be caused by these lesions \par \par She also restarted taking Creon after our discussion\par 2 pills of 36k lipase units with dinner\par Does not eat much during the day otherwise \par \par She was not able to  Bentyl bc of insurance issue\par She is having normal stools\par Her weight is stable but still not going up significantly\par \par Her  is coming home from rehab tomorrow and she is nervous about caring for him but glad to be home \par \par

## 2022-06-14 ENCOUNTER — OUTPATIENT (OUTPATIENT)
Dept: OUTPATIENT SERVICES | Facility: HOSPITAL | Age: 87
LOS: 1 days | Discharge: HOME | End: 2022-06-14

## 2022-06-14 DIAGNOSIS — H25.019 CORTICAL AGE-RELATED CATARACT, UNSPECIFIED EYE: Chronic | ICD-10-CM

## 2022-06-14 DIAGNOSIS — C90.01 MULTIPLE MYELOMA IN REMISSION: ICD-10-CM

## 2022-06-20 ENCOUNTER — RESULT REVIEW (OUTPATIENT)
Age: 87
End: 2022-06-20

## 2022-06-20 ENCOUNTER — APPOINTMENT (OUTPATIENT)
Dept: HEMATOLOGY ONCOLOGY | Facility: CLINIC | Age: 87
End: 2022-06-20
Payer: MEDICARE

## 2022-06-20 ENCOUNTER — LABORATORY RESULT (OUTPATIENT)
Age: 87
End: 2022-06-20

## 2022-06-20 VITALS
HEIGHT: 61 IN | SYSTOLIC BLOOD PRESSURE: 157 MMHG | RESPIRATION RATE: 16 BRPM | WEIGHT: 92.57 LBS | TEMPERATURE: 96.4 F | HEART RATE: 88 BPM | BODY MASS INDEX: 17.48 KG/M2 | OXYGEN SATURATION: 99 % | DIASTOLIC BLOOD PRESSURE: 82 MMHG

## 2022-06-20 DIAGNOSIS — Z72.89 OTHER PROBLEMS RELATED TO LIFESTYLE: ICD-10-CM

## 2022-06-20 DIAGNOSIS — C73 MALIGNANT NEOPLASM OF THYROID GLAND: ICD-10-CM

## 2022-06-20 DIAGNOSIS — M89.9 DISORDER OF BONE, UNSPECIFIED: ICD-10-CM

## 2022-06-20 DIAGNOSIS — Z83.3 FAMILY HISTORY OF DIABETES MELLITUS: ICD-10-CM

## 2022-06-20 DIAGNOSIS — M32.9 SYSTEMIC LUPUS ERYTHEMATOSUS, UNSPECIFIED: ICD-10-CM

## 2022-06-20 DIAGNOSIS — Z80.3 FAMILY HISTORY OF MALIGNANT NEOPLASM OF BREAST: ICD-10-CM

## 2022-06-20 DIAGNOSIS — E89.0 POSTPROCEDURAL HYPOTHYROIDISM: ICD-10-CM

## 2022-06-20 LAB
BASOPHILS # BLD AUTO: 0.03 K/UL — SIGNIFICANT CHANGE UP (ref 0–0.2)
BASOPHILS NFR BLD AUTO: 0.4 % — SIGNIFICANT CHANGE UP (ref 0–2)
EOSINOPHIL # BLD AUTO: 0.11 K/UL — SIGNIFICANT CHANGE UP (ref 0–0.5)
EOSINOPHIL NFR BLD AUTO: 1.6 % — SIGNIFICANT CHANGE UP (ref 0–6)
HCT VFR BLD CALC: 41.5 % — SIGNIFICANT CHANGE UP (ref 34.5–45)
HGB BLD-MCNC: 13.9 G/DL — SIGNIFICANT CHANGE UP (ref 11.5–15.5)
IMM GRANULOCYTES NFR BLD AUTO: 0.3 % — SIGNIFICANT CHANGE UP (ref 0–1.5)
LYMPHOCYTES # BLD AUTO: 1.17 K/UL — SIGNIFICANT CHANGE UP (ref 1–3.3)
LYMPHOCYTES # BLD AUTO: 17.4 % — SIGNIFICANT CHANGE UP (ref 13–44)
MCHC RBC-ENTMCNC: 29.5 PG — SIGNIFICANT CHANGE UP (ref 27–34)
MCHC RBC-ENTMCNC: 33.5 G/DL — SIGNIFICANT CHANGE UP (ref 32–36)
MCV RBC AUTO: 88.1 FL — SIGNIFICANT CHANGE UP (ref 80–100)
MONOCYTES # BLD AUTO: 0.67 K/UL — SIGNIFICANT CHANGE UP (ref 0–0.9)
MONOCYTES NFR BLD AUTO: 10 % — SIGNIFICANT CHANGE UP (ref 2–14)
NEUTROPHILS # BLD AUTO: 4.73 K/UL — SIGNIFICANT CHANGE UP (ref 1.8–7.4)
NEUTROPHILS NFR BLD AUTO: 70.3 % — SIGNIFICANT CHANGE UP (ref 43–77)
NRBC # BLD: 0 /100 WBCS — SIGNIFICANT CHANGE UP (ref 0–0)
PLATELET # BLD AUTO: 219 K/UL — SIGNIFICANT CHANGE UP (ref 150–400)
RBC # BLD: 4.71 M/UL — SIGNIFICANT CHANGE UP (ref 3.8–5.2)
RBC # FLD: 14.4 % — SIGNIFICANT CHANGE UP (ref 10.3–14.5)
WBC # BLD: 6.73 K/UL — SIGNIFICANT CHANGE UP (ref 3.8–10.5)
WBC # FLD AUTO: 6.73 K/UL — SIGNIFICANT CHANGE UP (ref 3.8–10.5)

## 2022-06-20 PROCEDURE — 99205 OFFICE O/P NEW HI 60 MIN: CPT

## 2022-06-20 RX ORDER — ROSUVASTATIN CALCIUM 5 MG/1
5 TABLET, FILM COATED ORAL
Qty: 90 | Refills: 0 | Status: DISCONTINUED | COMMUNITY
Start: 2020-09-21 | End: 2022-06-20

## 2022-06-20 RX ORDER — SIMVASTATIN 80 MG/1
TABLET, FILM COATED ORAL
Refills: 0 | Status: DISCONTINUED | COMMUNITY
End: 2022-06-20

## 2022-06-20 RX ORDER — CONJUGATED ESTROGENS 0.62 MG/G
0.62 CREAM VAGINAL
Qty: 30 | Refills: 0 | Status: COMPLETED | COMMUNITY
Start: 2022-05-19

## 2022-06-20 RX ORDER — PANCRELIPASE 36000; 180000; 114000 [USP'U]/1; [USP'U]/1; [USP'U]/1
36000-114000 CAPSULE, DELAYED RELEASE PELLETS ORAL
Qty: 100 | Refills: 0 | Status: ACTIVE | COMMUNITY
Start: 2022-06-08

## 2022-06-20 RX ORDER — DICYCLOMINE HYDROCHLORIDE 20 MG/1
20 TABLET ORAL EVERY 6 HOURS
Qty: 120 | Refills: 6 | Status: DISCONTINUED | COMMUNITY
Start: 2022-05-25 | End: 2022-06-20

## 2022-06-20 RX ORDER — OXYCODONE AND ACETAMINOPHEN 5; 325 MG/1; MG/1
5-325 TABLET ORAL
Qty: 40 | Refills: 0 | Status: DISCONTINUED | COMMUNITY
Start: 2018-03-01 | End: 2022-06-20

## 2022-06-20 RX ORDER — POTASSIUM CHLORIDE 1500 MG/1
20 TABLET, EXTENDED RELEASE ORAL
Qty: 90 | Refills: 0 | Status: DISCONTINUED | COMMUNITY
Start: 2020-05-24 | End: 2022-06-20

## 2022-06-20 RX ORDER — FAMOTIDINE 20 MG/1
20 TABLET, FILM COATED ORAL
Qty: 180 | Refills: 0 | Status: ACTIVE | COMMUNITY
Start: 2022-06-04

## 2022-06-23 LAB
ALBUMIN MFR SERPL ELPH: 58.8 %
ALBUMIN SERPL ELPH-MCNC: 4.8 G/DL
ALBUMIN SERPL-MCNC: 4.4 G/DL
ALBUMIN/GLOB SERPL: 1.5 RATIO
ALP BLD-CCNC: 59 U/L
ALPHA1 GLOB MFR SERPL ELPH: 3.7 %
ALPHA1 GLOB SERPL ELPH-MCNC: 0.3 G/DL
ALPHA2 GLOB MFR SERPL ELPH: 10.7 %
ALPHA2 GLOB SERPL ELPH-MCNC: 0.8 G/DL
ALT SERPL-CCNC: 16 U/L
ANION GAP SERPL CALC-SCNC: 14 MMOL/L
APPEARANCE: CLEAR
AST SERPL-CCNC: 19 U/L
B-GLOBULIN MFR SERPL ELPH: 11.4 %
B-GLOBULIN SERPL ELPH-MCNC: 0.8 G/DL
BILIRUB SERPL-MCNC: 0.3 MG/DL
BILIRUBIN URINE: NEGATIVE
BLOOD URINE: NEGATIVE
BUN SERPL-MCNC: 20 MG/DL
CALCIUM SERPL-MCNC: 9.5 MG/DL
CHLORIDE SERPL-SCNC: 100 MMOL/L
CO2 SERPL-SCNC: 28 MMOL/L
COLOR: COLORLESS
CREAT SERPL-MCNC: 0.78 MG/DL
DEPRECATED KAPPA LC FREE/LAMBDA SER: 1.92 RATIO
EGFR: 74 ML/MIN/1.73M2
GAMMA GLOB FLD ELPH-MCNC: 1.1 G/DL
GAMMA GLOB MFR SERPL ELPH: 15.4 %
GLUCOSE QUALITATIVE U: NEGATIVE
GLUCOSE SERPL-MCNC: 111 MG/DL
IGA SER QL IEP: 172 MG/DL
IGG SER QL IEP: 1151 MG/DL
IGM SER QL IEP: 71 MG/DL
INTERPRETATION SERPL IEP-IMP: NORMAL
KAPPA LC CSF-MCNC: 1.4 MG/DL
KAPPA LC SERPL-MCNC: 2.69 MG/DL
KETONES URINE: NORMAL
LDH SERPL-CCNC: 210 U/L
LEUKOCYTE ESTERASE URINE: NEGATIVE
M PROTEIN MFR SERPL ELPH: 3.9 %
M PROTEIN SPEC IFE-MCNC: NORMAL
MONOCLON BAND OBS SERPL: 0.3 G/DL
NITRITE URINE: NEGATIVE
PH URINE: 7
POTASSIUM SERPL-SCNC: 3.3 MMOL/L
PROT SERPL-MCNC: 7.4 G/DL
PROTEIN URINE: NEGATIVE
SODIUM SERPL-SCNC: 142 MMOL/L
SPECIFIC GRAVITY URINE: 1.01
UROBILINOGEN URINE: NORMAL

## 2022-06-24 PROBLEM — E89.0 HISTORY OF THYROIDECTOMY: Status: RESOLVED | Noted: 2019-03-05 | Resolved: 2022-06-24

## 2022-06-24 NOTE — CONSULT LETTER
[Dear  ___] : Dear  [unfilled], [Consult Letter:] : I had the pleasure of evaluating your patient, [unfilled]. [Please see my note below.] : Please see my note below. [Consult Closing:] : Thank you very much for allowing me to participate in the care of this patient.  If you have any questions, please do not hesitate to contact me. [Sincerely,] : Sincerely, [FreeTextEntry2] : [place] [address] [FreeTextEntry3] : Rudi Lu M.D., [facility]\par

## 2022-06-24 NOTE — REVIEW OF SYSTEMS
[Loss of Hearing] : loss of hearing [Joint Pain] : joint pain [Joint Stiffness] : joint stiffness [Insomnia] : insomnia [Anxiety] : anxiety [Negative] : Allergic/Immunologic [Dysphagia] : no dysphagia [Easy Bleeding] : no tendency for easy bleeding [Easy Bruising] : no tendency for easy bruising [Swollen Glands] : no swollen glands [FreeTextEntry4] : decr hearing right ear [FreeTextEntry7] : I [de-identified] : hypothyroid, prediabetes

## 2022-06-24 NOTE — HISTORY OF PRESENT ILLNESS
[de-identified] : Ms. Barclay is an 85 yo woman referred for further evaluation of recently seen iliac bone lucencies seen in a CT scan. \par In early 2021, she was diagnosed with pancreatic insufficiency during w/u for unintentional weight loss and loose stools.\par MRI and EUS showed evidence of chronic pancreatitis, and several <5mm pancreatic cysts without high risk features.Weight loss stabilized on creon\par but she stopped Creon b/o GI intolerance. \par She has c/o suprapubic discomfort, pelvic pain with (-) gyn exams. \par A f/u CT abd/pelvis showed stable findings though scattered lucent lesions throughout the iliac bones \par were seen measuring up to 0.5 cm. \par She has no pain in pelvic bones although she has chronic pain affecting axial skeleton and shoulders\par which has not worsened. 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

## 2022-06-24 NOTE — PHYSICAL EXAM
[Fully active, able to carry on all pre-disease performance without restriction] : Status 0 - Fully active, able to carry on all pre-disease performance without restriction [Thin] : thin [Normal] : affect appropriate [de-identified] : mild pedal edema

## 2022-06-24 NOTE — ASSESSMENT
[FreeTextEntry1] : 87 yo woman presents with CT findings showing lucencies in iliac bones. She has no new skeletal c/o. Metastatic or marrow infiltrative disease are considerations. Radiologist here thought findings were overread. I'll have the scan looked at by Dr. Barclay, our [facility] radiologist. CEA and Ca-19 were recently checked and are mildly elevated; these findings are of no significance in the absence of known malignancy.\par CBC results reviewed and d/w pt\par WBC 6.73, Hgb 13.9, Plt 219K, nl diff\par Bloodwork will be checked today including serum immunoelectropheresis, urine immunofixation and quant Bence-Carty protein. She is due for a mammogram.\par Further w/u will be deferred pending further radiology review and bloodwork results.\par The pt was reassured that the likelihood that she has a metastatic cancer is low.\par

## 2022-07-03 LAB
ALBUPE: 18.1 %
ALPHA1UPE: 34.1 %
ALPHA2UPE: 21.6 %
BETAUPE: 14.8 %
GAMMAUPE: 11.4 %
IGA 24H UR QL IFE: NORMAL
KAPPA LC 24H UR QL: NORMAL
PROT PATTERN 24H UR ELPH-IMP: NORMAL
PROT UR-MCNC: 7 MG/DL
PROT UR-MCNC: 7 MG/DL

## 2022-07-20 ENCOUNTER — RESULT REVIEW (OUTPATIENT)
Age: 87
End: 2022-07-20

## 2022-08-13 ENCOUNTER — APPOINTMENT (OUTPATIENT)
Dept: MRI IMAGING | Facility: IMAGING CENTER | Age: 87
End: 2022-08-13

## 2022-08-13 ENCOUNTER — OUTPATIENT (OUTPATIENT)
Dept: OUTPATIENT SERVICES | Facility: HOSPITAL | Age: 87
LOS: 1 days | End: 2022-08-13
Payer: MEDICARE

## 2022-08-13 DIAGNOSIS — M89.9 DISORDER OF BONE, UNSPECIFIED: ICD-10-CM

## 2022-08-13 DIAGNOSIS — H25.019 CORTICAL AGE-RELATED CATARACT, UNSPECIFIED EYE: Chronic | ICD-10-CM

## 2022-08-13 PROCEDURE — 72195 MRI PELVIS W/O DYE: CPT

## 2022-08-13 PROCEDURE — 72195 MRI PELVIS W/O DYE: CPT | Mod: 26

## 2022-08-16 ENCOUNTER — TRANSCRIPTION ENCOUNTER (OUTPATIENT)
Age: 87
End: 2022-08-16

## 2022-11-17 ENCOUNTER — APPOINTMENT (OUTPATIENT)
Dept: GASTROENTEROLOGY | Facility: CLINIC | Age: 87
End: 2022-11-17

## 2022-11-18 ENCOUNTER — APPOINTMENT (OUTPATIENT)
Dept: GASTROENTEROLOGY | Facility: CLINIC | Age: 87
End: 2022-11-18

## 2023-02-20 ENCOUNTER — INPATIENT (INPATIENT)
Facility: HOSPITAL | Age: 88
LOS: 4 days | Discharge: HOME HEALTH | DRG: 291 | End: 2023-02-25
Attending: HOSPITALIST | Admitting: STUDENT IN AN ORGANIZED HEALTH CARE EDUCATION/TRAINING PROGRAM
Payer: MEDICARE

## 2023-02-20 VITALS — OXYGEN SATURATION: 97 % | HEART RATE: 95 BPM

## 2023-02-20 DIAGNOSIS — H25.019 CORTICAL AGE-RELATED CATARACT, UNSPECIFIED EYE: Chronic | ICD-10-CM

## 2023-02-20 PROCEDURE — 99291 CRITICAL CARE FIRST HOUR: CPT

## 2023-02-20 RX ORDER — FUROSEMIDE 40 MG
20 TABLET ORAL ONCE
Refills: 0 | Status: COMPLETED | OUTPATIENT
Start: 2023-02-20 | End: 2023-02-20

## 2023-02-20 RX ADMIN — Medication 20 MILLIGRAM(S): at 23:30

## 2023-02-21 DIAGNOSIS — R06.03 ACUTE RESPIRATORY DISTRESS: ICD-10-CM

## 2023-02-21 DIAGNOSIS — I10 ESSENTIAL (PRIMARY) HYPERTENSION: ICD-10-CM

## 2023-02-21 DIAGNOSIS — I48.0 PAROXYSMAL ATRIAL FIBRILLATION: ICD-10-CM

## 2023-02-21 DIAGNOSIS — J96.01 ACUTE RESPIRATORY FAILURE WITH HYPOXIA: ICD-10-CM

## 2023-02-21 DIAGNOSIS — I50.9 HEART FAILURE, UNSPECIFIED: ICD-10-CM

## 2023-02-21 DIAGNOSIS — E03.9 HYPOTHYROIDISM, UNSPECIFIED: ICD-10-CM

## 2023-02-21 LAB
ALBUMIN SERPL ELPH-MCNC: 3.9 G/DL — SIGNIFICANT CHANGE UP (ref 3.3–5)
ALBUMIN SERPL ELPH-MCNC: 3.9 G/DL — SIGNIFICANT CHANGE UP (ref 3.3–5)
ALP SERPL-CCNC: 62 U/L — SIGNIFICANT CHANGE UP (ref 40–120)
ALP SERPL-CCNC: 64 U/L — SIGNIFICANT CHANGE UP (ref 40–120)
ALT FLD-CCNC: 24 U/L — SIGNIFICANT CHANGE UP (ref 10–45)
ALT FLD-CCNC: 33 U/L — SIGNIFICANT CHANGE UP (ref 10–45)
ANION GAP SERPL CALC-SCNC: 14 MMOL/L — SIGNIFICANT CHANGE UP (ref 5–17)
ANION GAP SERPL CALC-SCNC: 18 MMOL/L — HIGH (ref 5–17)
APPEARANCE UR: CLEAR — SIGNIFICANT CHANGE UP
APTT BLD: 30.1 SEC — SIGNIFICANT CHANGE UP (ref 27.5–35.5)
AST SERPL-CCNC: 22 U/L — SIGNIFICANT CHANGE UP (ref 10–40)
AST SERPL-CCNC: 59 U/L — HIGH (ref 10–40)
BASE EXCESS BLDV CALC-SCNC: -1.3 MMOL/L — SIGNIFICANT CHANGE UP (ref -2–3)
BASE EXCESS BLDV CALC-SCNC: 3.2 MMOL/L — HIGH (ref -2–3)
BASE EXCESS BLDV CALC-SCNC: 5.3 MMOL/L — HIGH (ref -2–3)
BASOPHILS # BLD AUTO: 0.04 K/UL — SIGNIFICANT CHANGE UP (ref 0–0.2)
BASOPHILS NFR BLD AUTO: 0.4 % — SIGNIFICANT CHANGE UP (ref 0–2)
BILIRUB DIRECT SERPL-MCNC: <0.1 MG/DL — SIGNIFICANT CHANGE UP (ref 0–0.3)
BILIRUB INDIRECT FLD-MCNC: >0.3 MG/DL — SIGNIFICANT CHANGE UP (ref 0.2–1)
BILIRUB SERPL-MCNC: 0.2 MG/DL — SIGNIFICANT CHANGE UP (ref 0.2–1.2)
BILIRUB SERPL-MCNC: 0.4 MG/DL — SIGNIFICANT CHANGE UP (ref 0.2–1.2)
BILIRUB UR-MCNC: NEGATIVE — SIGNIFICANT CHANGE UP
BUN SERPL-MCNC: 21 MG/DL — SIGNIFICANT CHANGE UP (ref 7–23)
BUN SERPL-MCNC: 23 MG/DL — SIGNIFICANT CHANGE UP (ref 7–23)
CA-I SERPL-SCNC: 1.07 MMOL/L — LOW (ref 1.15–1.33)
CA-I SERPL-SCNC: 1.12 MMOL/L — LOW (ref 1.15–1.33)
CA-I SERPL-SCNC: 1.12 MMOL/L — LOW (ref 1.15–1.33)
CALCIUM SERPL-MCNC: 8.8 MG/DL — SIGNIFICANT CHANGE UP (ref 8.4–10.5)
CALCIUM SERPL-MCNC: 8.8 MG/DL — SIGNIFICANT CHANGE UP (ref 8.4–10.5)
CHLORIDE BLDV-SCNC: 101 MMOL/L — SIGNIFICANT CHANGE UP (ref 96–108)
CHLORIDE SERPL-SCNC: 101 MMOL/L — SIGNIFICANT CHANGE UP (ref 96–108)
CHLORIDE SERPL-SCNC: 101 MMOL/L — SIGNIFICANT CHANGE UP (ref 96–108)
CO2 BLDV-SCNC: 28 MMOL/L — HIGH (ref 22–26)
CO2 BLDV-SCNC: 31 MMOL/L — HIGH (ref 22–26)
CO2 BLDV-SCNC: 31 MMOL/L — HIGH (ref 22–26)
CO2 SERPL-SCNC: 19 MMOL/L — LOW (ref 22–31)
CO2 SERPL-SCNC: 25 MMOL/L — SIGNIFICANT CHANGE UP (ref 22–31)
COLOR SPEC: COLORLESS — SIGNIFICANT CHANGE UP
CREAT SERPL-MCNC: 0.93 MG/DL — SIGNIFICANT CHANGE UP (ref 0.5–1.3)
CREAT SERPL-MCNC: 0.97 MG/DL — SIGNIFICANT CHANGE UP (ref 0.5–1.3)
DIFF PNL FLD: NEGATIVE — SIGNIFICANT CHANGE UP
EGFR: 57 ML/MIN/1.73M2 — LOW
EGFR: 60 ML/MIN/1.73M2 — SIGNIFICANT CHANGE UP
EOSINOPHIL # BLD AUTO: 0.29 K/UL — SIGNIFICANT CHANGE UP (ref 0–0.5)
EOSINOPHIL NFR BLD AUTO: 2.6 % — SIGNIFICANT CHANGE UP (ref 0–6)
FLUAV AG NPH QL: SIGNIFICANT CHANGE UP
FLUBV AG NPH QL: SIGNIFICANT CHANGE UP
GAS PNL BLDV: 136 MMOL/L — SIGNIFICANT CHANGE UP (ref 136–145)
GAS PNL BLDV: 136 MMOL/L — SIGNIFICANT CHANGE UP (ref 136–145)
GAS PNL BLDV: 137 MMOL/L — SIGNIFICANT CHANGE UP (ref 136–145)
GAS PNL BLDV: SIGNIFICANT CHANGE UP
GLUCOSE BLDV-MCNC: 136 MG/DL — HIGH (ref 70–99)
GLUCOSE BLDV-MCNC: 158 MG/DL — HIGH (ref 70–99)
GLUCOSE BLDV-MCNC: 222 MG/DL — HIGH (ref 70–99)
GLUCOSE SERPL-MCNC: 141 MG/DL — HIGH (ref 70–99)
GLUCOSE SERPL-MCNC: 227 MG/DL — HIGH (ref 70–99)
GLUCOSE UR QL: NEGATIVE — SIGNIFICANT CHANGE UP
HCO3 BLDV-SCNC: 26 MMOL/L — SIGNIFICANT CHANGE UP (ref 22–29)
HCO3 BLDV-SCNC: 30 MMOL/L — HIGH (ref 22–29)
HCO3 BLDV-SCNC: 30 MMOL/L — HIGH (ref 22–29)
HCT VFR BLD CALC: 37.6 % — SIGNIFICANT CHANGE UP (ref 34.5–45)
HCT VFR BLD CALC: 41 % — SIGNIFICANT CHANGE UP (ref 34.5–45)
HCT VFR BLDA CALC: 37 % — SIGNIFICANT CHANGE UP (ref 34.5–46.5)
HCT VFR BLDA CALC: 38 % — SIGNIFICANT CHANGE UP (ref 34.5–46.5)
HCT VFR BLDA CALC: 39 % — SIGNIFICANT CHANGE UP (ref 34.5–46.5)
HGB BLD CALC-MCNC: 12.4 G/DL — SIGNIFICANT CHANGE UP (ref 11.7–16.1)
HGB BLD CALC-MCNC: 12.6 G/DL — SIGNIFICANT CHANGE UP (ref 11.7–16.1)
HGB BLD CALC-MCNC: 12.9 G/DL — SIGNIFICANT CHANGE UP (ref 11.7–16.1)
HGB BLD-MCNC: 12 G/DL — SIGNIFICANT CHANGE UP (ref 11.5–15.5)
HGB BLD-MCNC: 12.7 G/DL — SIGNIFICANT CHANGE UP (ref 11.5–15.5)
IMM GRANULOCYTES NFR BLD AUTO: 0.8 % — SIGNIFICANT CHANGE UP (ref 0–0.9)
INR BLD: 1.18 RATIO — HIGH (ref 0.88–1.16)
KETONES UR-MCNC: NEGATIVE — SIGNIFICANT CHANGE UP
LACTATE BLDV-MCNC: 1.5 MMOL/L — SIGNIFICANT CHANGE UP (ref 0.5–2)
LACTATE BLDV-MCNC: 2.9 MMOL/L — HIGH (ref 0.5–2)
LACTATE BLDV-MCNC: 4.2 MMOL/L — CRITICAL HIGH (ref 0.5–2)
LEUKOCYTE ESTERASE UR-ACNC: NEGATIVE — SIGNIFICANT CHANGE UP
LYMPHOCYTES # BLD AUTO: 1.6 K/UL — SIGNIFICANT CHANGE UP (ref 1–3.3)
LYMPHOCYTES # BLD AUTO: 14.5 % — SIGNIFICANT CHANGE UP (ref 13–44)
MAGNESIUM SERPL-MCNC: 2.5 MG/DL — SIGNIFICANT CHANGE UP (ref 1.6–2.6)
MCHC RBC-ENTMCNC: 28.7 PG — SIGNIFICANT CHANGE UP (ref 27–34)
MCHC RBC-ENTMCNC: 28.7 PG — SIGNIFICANT CHANGE UP (ref 27–34)
MCHC RBC-ENTMCNC: 31 GM/DL — LOW (ref 32–36)
MCHC RBC-ENTMCNC: 31.9 GM/DL — LOW (ref 32–36)
MCV RBC AUTO: 90 FL — SIGNIFICANT CHANGE UP (ref 80–100)
MCV RBC AUTO: 92.8 FL — SIGNIFICANT CHANGE UP (ref 80–100)
MONOCYTES # BLD AUTO: 0.72 K/UL — SIGNIFICANT CHANGE UP (ref 0–0.9)
MONOCYTES NFR BLD AUTO: 6.5 % — SIGNIFICANT CHANGE UP (ref 2–14)
NEUTROPHILS # BLD AUTO: 8.27 K/UL — HIGH (ref 1.8–7.4)
NEUTROPHILS NFR BLD AUTO: 75.2 % — SIGNIFICANT CHANGE UP (ref 43–77)
NITRITE UR-MCNC: NEGATIVE — SIGNIFICANT CHANGE UP
NRBC # BLD: 0 /100 WBCS — SIGNIFICANT CHANGE UP (ref 0–0)
NRBC # BLD: 0 /100 WBCS — SIGNIFICANT CHANGE UP (ref 0–0)
NT-PROBNP SERPL-SCNC: 3170 PG/ML — HIGH (ref 0–300)
PCO2 BLDV: 43 MMHG — HIGH (ref 39–42)
PCO2 BLDV: 51 MMHG — HIGH (ref 39–42)
PCO2 BLDV: 56 MMHG — HIGH (ref 39–42)
PH BLDV: 7.28 — LOW (ref 7.32–7.43)
PH BLDV: 7.37 — SIGNIFICANT CHANGE UP (ref 7.32–7.43)
PH BLDV: 7.45 — HIGH (ref 7.32–7.43)
PH UR: 7 — SIGNIFICANT CHANGE UP (ref 5–8)
PLATELET # BLD AUTO: 203 K/UL — SIGNIFICANT CHANGE UP (ref 150–400)
PLATELET # BLD AUTO: 245 K/UL — SIGNIFICANT CHANGE UP (ref 150–400)
PO2 BLDV: 24 MMHG — LOW (ref 25–45)
PO2 BLDV: 47 MMHG — HIGH (ref 25–45)
PO2 BLDV: 61 MMHG — HIGH (ref 25–45)
POTASSIUM BLDV-SCNC: 3.2 MMOL/L — LOW (ref 3.5–5.1)
POTASSIUM BLDV-SCNC: 3.5 MMOL/L — SIGNIFICANT CHANGE UP (ref 3.5–5.1)
POTASSIUM BLDV-SCNC: 4.1 MMOL/L — SIGNIFICANT CHANGE UP (ref 3.5–5.1)
POTASSIUM SERPL-MCNC: 3.5 MMOL/L — SIGNIFICANT CHANGE UP (ref 3.5–5.3)
POTASSIUM SERPL-MCNC: 6.5 MMOL/L — CRITICAL HIGH (ref 3.5–5.3)
POTASSIUM SERPL-SCNC: 3.5 MMOL/L — SIGNIFICANT CHANGE UP (ref 3.5–5.3)
POTASSIUM SERPL-SCNC: 6.5 MMOL/L — CRITICAL HIGH (ref 3.5–5.3)
PROT SERPL-MCNC: 7.1 G/DL — SIGNIFICANT CHANGE UP (ref 6–8.3)
PROT SERPL-MCNC: 7.7 G/DL — SIGNIFICANT CHANGE UP (ref 6–8.3)
PROT UR-MCNC: SIGNIFICANT CHANGE UP
PROTHROM AB SERPL-ACNC: 13.6 SEC — HIGH (ref 10.5–13.4)
RBC # BLD: 4.18 M/UL — SIGNIFICANT CHANGE UP (ref 3.8–5.2)
RBC # BLD: 4.42 M/UL — SIGNIFICANT CHANGE UP (ref 3.8–5.2)
RBC # FLD: 15.1 % — HIGH (ref 10.3–14.5)
RBC # FLD: 15.4 % — HIGH (ref 10.3–14.5)
RSV RNA NPH QL NAA+NON-PROBE: SIGNIFICANT CHANGE UP
SAO2 % BLDV: 28.9 % — LOW (ref 67–88)
SAO2 % BLDV: 73.8 % — SIGNIFICANT CHANGE UP (ref 67–88)
SAO2 % BLDV: 90.3 % — HIGH (ref 67–88)
SARS-COV-2 RNA SPEC QL NAA+PROBE: SIGNIFICANT CHANGE UP
SODIUM SERPL-SCNC: 138 MMOL/L — SIGNIFICANT CHANGE UP (ref 135–145)
SODIUM SERPL-SCNC: 140 MMOL/L — SIGNIFICANT CHANGE UP (ref 135–145)
SP GR SPEC: 1.01 — LOW (ref 1.01–1.02)
TROPONIN T, HIGH SENSITIVITY RESULT: 37 NG/L — SIGNIFICANT CHANGE UP (ref 0–51)
TROPONIN T, HIGH SENSITIVITY RESULT: 73 NG/L — HIGH (ref 0–51)
TROPONIN T, HIGH SENSITIVITY RESULT: 77 NG/L — HIGH (ref 0–51)
TSH SERPL-MCNC: 7.39 UIU/ML — HIGH (ref 0.27–4.2)
UROBILINOGEN FLD QL: NEGATIVE — SIGNIFICANT CHANGE UP
WBC # BLD: 11.01 K/UL — HIGH (ref 3.8–10.5)
WBC # BLD: 11.99 K/UL — HIGH (ref 3.8–10.5)
WBC # FLD AUTO: 11.01 K/UL — HIGH (ref 3.8–10.5)
WBC # FLD AUTO: 11.99 K/UL — HIGH (ref 3.8–10.5)

## 2023-02-21 PROCEDURE — 71045 X-RAY EXAM CHEST 1 VIEW: CPT | Mod: 26

## 2023-02-21 PROCEDURE — 99223 1ST HOSP IP/OBS HIGH 75: CPT

## 2023-02-21 PROCEDURE — 12345: CPT | Mod: NC

## 2023-02-21 PROCEDURE — 99223 1ST HOSP IP/OBS HIGH 75: CPT | Mod: GC

## 2023-02-21 RX ORDER — FUROSEMIDE 40 MG
20 TABLET ORAL
Refills: 0 | Status: DISCONTINUED | OUTPATIENT
Start: 2023-02-21 | End: 2023-02-23

## 2023-02-21 RX ORDER — ACETAMINOPHEN 500 MG
650 TABLET ORAL EVERY 6 HOURS
Refills: 0 | Status: DISCONTINUED | OUTPATIENT
Start: 2023-02-21 | End: 2023-02-25

## 2023-02-21 RX ORDER — LOSARTAN POTASSIUM 100 MG/1
25 TABLET, FILM COATED ORAL DAILY
Refills: 0 | Status: DISCONTINUED | OUTPATIENT
Start: 2023-02-21 | End: 2023-02-25

## 2023-02-21 RX ORDER — AMIODARONE HYDROCHLORIDE 400 MG/1
200 TABLET ORAL DAILY
Refills: 0 | Status: DISCONTINUED | OUTPATIENT
Start: 2023-02-21 | End: 2023-02-25

## 2023-02-21 RX ORDER — LANOLIN ALCOHOL/MO/W.PET/CERES
3 CREAM (GRAM) TOPICAL AT BEDTIME
Refills: 0 | Status: DISCONTINUED | OUTPATIENT
Start: 2023-02-21 | End: 2023-02-25

## 2023-02-21 RX ORDER — ONDANSETRON 8 MG/1
4 TABLET, FILM COATED ORAL EVERY 8 HOURS
Refills: 0 | Status: DISCONTINUED | OUTPATIENT
Start: 2023-02-21 | End: 2023-02-25

## 2023-02-21 RX ORDER — FAMOTIDINE 10 MG/ML
20 INJECTION INTRAVENOUS DAILY
Refills: 0 | Status: DISCONTINUED | OUTPATIENT
Start: 2023-02-21 | End: 2023-02-25

## 2023-02-21 RX ORDER — LEVOTHYROXINE SODIUM 125 MCG
112 TABLET ORAL DAILY
Refills: 0 | Status: DISCONTINUED | OUTPATIENT
Start: 2023-02-21 | End: 2023-02-25

## 2023-02-21 RX ORDER — APIXABAN 2.5 MG/1
2.5 TABLET, FILM COATED ORAL EVERY 12 HOURS
Refills: 0 | Status: DISCONTINUED | OUTPATIENT
Start: 2023-02-21 | End: 2023-02-25

## 2023-02-21 RX ORDER — ACETAMINOPHEN 500 MG
975 TABLET ORAL ONCE
Refills: 0 | Status: COMPLETED | OUTPATIENT
Start: 2023-02-21 | End: 2023-02-21

## 2023-02-21 RX ADMIN — Medication 112 MICROGRAM(S): at 06:22

## 2023-02-21 RX ADMIN — APIXABAN 2.5 MILLIGRAM(S): 2.5 TABLET, FILM COATED ORAL at 06:21

## 2023-02-21 RX ADMIN — FAMOTIDINE 20 MILLIGRAM(S): 10 INJECTION INTRAVENOUS at 12:03

## 2023-02-21 RX ADMIN — AMIODARONE HYDROCHLORIDE 200 MILLIGRAM(S): 400 TABLET ORAL at 06:22

## 2023-02-21 RX ADMIN — LOSARTAN POTASSIUM 25 MILLIGRAM(S): 100 TABLET, FILM COATED ORAL at 06:22

## 2023-02-21 RX ADMIN — Medication 20 MILLIGRAM(S): at 06:23

## 2023-02-21 RX ADMIN — APIXABAN 2.5 MILLIGRAM(S): 2.5 TABLET, FILM COATED ORAL at 17:38

## 2023-02-21 RX ADMIN — Medication 975 MILLIGRAM(S): at 08:56

## 2023-02-21 RX ADMIN — Medication 975 MILLIGRAM(S): at 05:25

## 2023-02-21 RX ADMIN — Medication 20 MILLIGRAM(S): at 15:59

## 2023-02-21 NOTE — ED PROVIDER NOTE - OBJECTIVE STATEMENT
87-year-old female past medical history of hypothyroidism status post thyroidectomy, atrial fibrillation presenting with acute onset shortness of breath and cough.  Patient states started out of nowhere earlier today.  Was brought in by EMS on CPAP.  Per report, patient was hypoxic, brought to the mid 80s with CPAP. Otherwise hemodynamically stable.  Patient states she had a little bit of chest pain prior to arrival, which was relieved with sublingual nitroglycerin.  EMS also gave 20 of Lasix prior to arrival.  Patient denies fever/chills, nausea/vomiting/diarrhea, abdominal pain, lower extremity swelling, syncope.  Of note, patient had a recent admission for COVID.

## 2023-02-21 NOTE — ED PROVIDER NOTE - NSICDXPASTSURGICALHX_GEN_ALL_CORE_FT
PAST SURGICAL HISTORY:  Cataract cortical, senile s/p extraction right eye 12/13    History of abdominal hysterectomy 1970    History of appendectomy 1939    S/P breast biopsy Right breast benign cyst removed.    S/P thyroidectomy 1981     Yes

## 2023-02-21 NOTE — ED PROVIDER NOTE - ATTENDING CONTRIBUTION TO CARE
Afebrile. Awake and Alert. Lungs general crackles. +tachypnea. +JVD. Heart RRR. Abdomen soft NTND. CN II-XII grossly intact. Moves all extremities without lateralization. No LE edema.

## 2023-02-21 NOTE — H&P ADULT - NSHPREVIEWOFSYSTEMS_GEN_ALL_CORE
CONSTITUTIONAL: No weakness, fevers or chills  EYES/ENT: No visual changes;  No dysphagia  NECK: No pain or stiffness  RESPIRATORY: + cough, no wheezing, hemoptysis; + shortness of breath  CARDIOVASCULAR: + chest pressure , no chest pain or palpitations; No lower extremity edema  EXTREMITIES: no le edema, cyanosis, clubbing  GASTROINTESTINAL: No abdominal or epigastric pain. No nausea, vomiting, or hematemesis; No diarrhea or constipation. No melena or hematochezia.  BACK: No back pain  GENITOURINARY: No dysuria, frequency or hematuria  NEUROLOGICAL: No numbness or weakness  MSK: no joint swelling or pain  SKIN: No itching, burning, rashes, or lesions   PSYCH: no agitation  All other review of systems is negative unless indicated above.

## 2023-02-21 NOTE — ED PROVIDER NOTE - DIFFERENTIAL DIAGNOSIS
Differential Diagnosis hypoxic respiratory failure: acute decompensated CHF, pneumonia, bronchitis, ACS

## 2023-02-21 NOTE — H&P ADULT - PROBLEM SELECTOR PLAN 1
elevated BNP , symptoms c/w PND/ orthopnea , CXR w/ findings c/w pulm edema. Patient improved with lasix and bipap , now on NC . Patient was evaluated by Cardiology.  has mild leukocytosis , but afebrile , RVP neg , low suspicion for infectious etiology , and VTE less likely as patient on anticoagulation   - continue IV lasix   - monitor  mag and K, replete as needed  - monitor on telemetry  - strict ins and outs  - daily weight  - echocardiogram   - Cardiology consult appreciated, day team to follow up further recommendations  - recent Records from [facility] can be obtained by day team if available elevated BNP , symptoms c/w PND/ orthopnea , CXR w/ findings c/w pulm edema. Patient improved with lasix and bipap , now on NC . Patient was evaluated by Cardiology.  has mild leukocytosis , but afebrile , RVP neg , low suspicion for infectious etiology , and VTE less likely as patient on anticoagulation   - continue IV lasix   - monitor  mag and K, replete as needed  - monitor on telemetry  - trend troponin   - strict ins and outs  - daily weight  - echocardiogram   - Cardiology consult appreciated, day team to follow up further recommendations  - recent Records from [facility] can be obtained by day team if available

## 2023-02-21 NOTE — ED ADULT NURSE NOTE - NSIMPLEMENTINTERV_GEN_ALL_ED
Standing/Walking
Implemented All Universal Safety Interventions:  [place] to call system. Call bell, personal items and telephone within reach. Instruct patient to call for assistance. Room bathroom lighting operational. Non-slip footwear when patient is off stretcher. Physically safe environment: no spills, clutter or unnecessary equipment. Stretcher in lowest position, wheels locked, appropriate side rails in place.

## 2023-02-21 NOTE — ED PROVIDER NOTE - CARE PLAN
Principal Discharge DX:	Acute respiratory distress  Secondary Diagnosis:	Acute exacerbation of CHF (congestive heart failure)   1

## 2023-02-21 NOTE — ED PROVIDER NOTE - NSICDXPASTMEDICALHX_GEN_ALL_CORE_FT
PAST MEDICAL HISTORY:  Anemia     Bleeding hemorrhoid     Bundle branch block, left     H/O: hypothyroidism     Hypercholesteremia     Left ovarian cyst     Lupus     Meniere disease     Thyroid ca

## 2023-02-21 NOTE — PROGRESS NOTE ADULT - ASSESSMENT
87yoF PMH hypothyroidism, HTN,  newly diagnosed heart failure and afib with COVID in January 2023 (hospitalized at [facility]) presents 2/21 with SOB x 1 day c/w acute on chronic decompensated heart failure exacerbation with acute respiratory failure requiring bipap transitioned to NC after diuresis.

## 2023-02-21 NOTE — CONSULT NOTE ADULT - SUBJECTIVE AND OBJECTIVE BOX
CARDIOLOGY FELLOW CONSULT NOTE      HPI: The patient is an 86yo female with a PMH of Afib (on Eliquis), CHF (unknown EF), hypothyroidism, HTN who presents to the ED from home with dyspnea (including with exertion), coughing, and recent weight gain. The pt states that she was hospitalized twice at [facility] in January, the first time with tachycardia and the second with COVID. During her first hospitalization she was treated with Amiodarone and Lasix and also had an angiogram which she states did not show any blockages. An echo at that time demonstrated that her "heart was weak." She follows with Dr. Prince Fink for cardiology at [facility] and she has an upcoming appointment with him to repeat her echo. She feels that she hasn't felt back to baseline since her discharge from the hospital. Per report from the ED, the pt was given Lasix, nitro and started on CPAP in the field. Upon arrival to the ED she was switched to BiPAP. She states that she never had chest pain today, and denies currently as well. She reports feeling significantly better from when she came in.       PMHx:   H/O: hypothyroidism    Hypercholesteremia    Thyroid ca    Lupus    Meniere disease    Anemia    Bundle branch block, left    Bleeding hemorrhoid    Left ovarian cyst        PSHx:   S/P thyroidectomy    History of appendectomy    History of abdominal hysterectomy    S/P breast biopsy    Cataract cortical, senile        Allergies:  aspirin (Hives)  Mushrooms. (Nausea)  penicillins (Hives)  shellfish (Hives)  sulfa drugs (Anaphylaxis)      FAMILY HISTORY:  Type 2 diabetes mellitus (Mother)        Social History:  Smoking History: None   Alcohol Use: None    Drug Use: None     REVIEW OF SYSTEMS:  CONSTITUTIONAL: No weakness, fevers or chills  EYES/ENT: No visual changes;  No dysphagia  NECK: No pain or stiffness  RESPIRATORY: + cough, no wheezing, hemoptysis; + shortness of breath  CARDIOVASCULAR: No chest pain or palpitations; No lower extremity edema  GASTROINTESTINAL: No abdominal or epigastric pain. No nausea, vomiting, or hematemesis; No diarrhea or constipation. No melena or hematochezia.  BACK: No back pain  GENITOURINARY: No dysuria, frequency or hematuria  NEUROLOGICAL: No numbness or weakness  SKIN: No itching, burning, rashes, or lesions   All other review of systems is negative unless indicated above.    Physical Exam:  T(F): 97.9 (02-21), Max: 97.9 (02-21)  HR: 78 (02-21) (78 - 95)  BP: 121/69 (02-21) (113/92 - 121/69)  RR: 18 (02-21)  SpO2: 100% (02-21)      Appearance: No acute distress; well appearing  Eyes: pink conjunctiva  HEENT: AT/NC   Cardiovascular: RRR, S1, S2, no murmurs, rubs, or gallops; 1+ LE edema; + JVD, cap refill <2 secs  Respiratory: Intermittent crackles   Gastrointestinal: soft, non-tender, non-distended   Musculoskeletal: No clubbing; no joint deformity   Neurologic: Normal speech, no facial asymmetry  Psychiatry: AAOx3, mood & affect appropriate  Skin: No rashes, ecchymoses, or cyanosis of exposed skin                               12.7   11.01 )-----------( 245      ( 21 Feb 2023 00:23 )             41.0     02-21    138  |  101  |  21  ----------------------------<  227<H>  6.5<HH>   |  19<L>  |  0.97    Ca    8.8      21 Feb 2023 00:23  Mg     2.5     02-21    TPro  7.7  /  Alb  3.9  /  TBili  0.2  /  DBili  x   /  AST  59<H>  /  ALT  33  /  AlkPhos  62  02-21    PT/INR - ( 21 Feb 2023 00:23 )   PT: 13.6 sec;   INR: 1.18 ratio         PTT - ( 21 Feb 2023 00:23 )  PTT:30.1 sec    CARDIAC MARKERS ( 21 Feb 2023 01:33 )  77 ng/L / x     / x     / x     / x     / x      CARDIAC MARKERS ( 21 Feb 2023 00:23 )  37 ng/L / x     / x     / x     / x     / x            Serum Pro-Brain Natriuretic Peptide: 3170 pg/mL (02-21 @ 00:23)               88 yo F with a PMH of Afib (on Eliquis), CHF (unknown EF), & HTN.  Hospitalized twice at [facility] in January, the first time with tachycardia and the second with COVID.  She reports that angiogram at that time did not show blockages and that an echo at that time demonstrated that her "heart was weak."    She now presents for worsened shortness of breath over that past day.  Patient reports sudden onset coughing spell with non-productive cough , associated with shortness of breath. She also reports intermittent episodes of dyspnea when laying down associated with midsternal chest pressure , occurring over the past several nights, as well as 4 pillow orthopnea .  She describes minimal lower extremity edema. She reports no chest pain or palpitations and no fever or chills.     Per report from the ED, the pt was given Lasix, nitro and started on CPAP in the field.  She improvement after these treatments.      PMHx:   hypothyroidism  Hypercholesteremia  Thyroid ca  Lupus  Meniere disease  Anemia  Bundle branch block, left  Bleeding hemorrhoid  Left ovarian cyst      PSHx:   S/P thyroidectomy  History of appendectomy  History of abdominal hysterectomy  S/P breast biopsy  Cataract cortical, senile        Allergies:  aspirin (Hives)  Mushrooms. (Nausea)  penicillins (Hives)  shellfish (Hives)  sulfa drugs (Anaphylaxis)      FAMILY HISTORY:  Type 2 diabetes mellitus (Mother)        Social History:  Smoking History: None   Alcohol Use: None    Drug Use: None     REVIEW OF SYSTEMS:  CONSTITUTIONAL: No weakness, fevers or chills  EYES/ENT: No visual changes;  No dysphagia  NECK: No pain or stiffness  RESPIRATORY: + cough, no wheezing, hemoptysis; + shortness of breath  CARDIOVASCULAR: No chest pain or palpitations; No lower extremity edema  GASTROINTESTINAL: No abdominal or epigastric pain. No nausea, vomiting, or hematemesis; No diarrhea or constipation. No melena or hematochezia.  BACK: No back pain  GENITOURINARY: No dysuria, frequency or hematuria  NEUROLOGICAL: No numbness or weakness  SKIN: No itching, burning, rashes, or lesions   All other review of systems is negative unless indicated above.    Physical Exam:  T(F): 97.9 (02-21), Max: 97.9 (02-21)  HR: 78 (02-21) (78 - 95)  BP: 121/69 (02-21) (113/92 - 121/69)  RR: 18 (02-21)  SpO2: 100% (02-21)      Appearance: No acute distress; well appearing  Eyes: pink conjunctiva  HEENT: AT/NC   Cardiovascular: RRR, S1, S2, no murmurs, rubs, or gallops; 1+ LE edema; + JVD, cap refill <2 secs  Respiratory: Intermittent crackles   Gastrointestinal: soft, non-tender, non-distended   Musculoskeletal: No clubbing; no joint deformity   Neurologic: Normal speech, no facial asymmetry  Psychiatry: AAOx3, mood & affect appropriate  Skin: No rashes, ecchymoses, or cyanosis of exposed skin       12 Lead ECG (02.21.23 @ 02:20)   NSR at 79 BPM   P-R Interval 182 ms, QRS Duration 142 ms, Q-T Interval 444 ms   Axis -46 degrees   LAD, LBBB      LABS:                      12.7   11.01 )-----------( 245      ( 21 Feb 2023 00:23 )             41.0     02-21    138  |  101  |  21  ----------------------------<  227<H>  6.5<HH>   |  19<L>  |  0.97    Ca    8.8      21 Feb 2023 00:23  Mg     2.5     02-21    TPro  7.7  /  Alb  3.9  /  TBili  0.2  /  DBili  x   /  AST  59<H>  /  ALT  33  /  AlkPhos  62  02-21    PT/INR - ( 21 Feb 2023 00:23 )   PT: 13.6 sec;   INR: 1.18 ratio    PTT - ( 21 Feb 2023 00:23 )  PTT:30.1 sec    CARDIAC MARKERS ( 21 Feb 2023 01:33 )  77 ng/L / x     / x     / x     / x     / x      CARDIAC MARKERS ( 21 Feb 2023 00:23 )  37 ng/L / x     / x     / x     / x     / x        Serum Pro-Brain Natriuretic Peptide: 3170 pg/mL (02-21 @ 00:23)      Xray Chest 1 View- PORTABLE-Urgent (02.21.23 @ 00:31)   TECHNIQUE: Frontal radiograph of the chest.    COMPARISON: No comparison available.    FINDINGS:  Faint upper lung right-sided predominant patchy opacities.  No pleural effusion or pneumothorax.  Heart size is within normal limits.  No acute abnormality within visible osseous structures.    IMPRESSION:  Faint upper lung right-sided predominant patchy opacities, possibly  pneumonia.    AURORA ACE MD; Resident Radiologist  This document has been electronically signed.  LAURA MURILLO MD; Attending Radiologist  This document has been electronically signed. Feb 21 2023 11:14AM

## 2023-02-21 NOTE — H&P ADULT - NSHPPHYSICALEXAM_GEN_ALL_CORE
Vital Signs Last 24 Hrs  T(C): 36.8 (21 Feb 2023 04:37), Max: 36.8 (21 Feb 2023 04:37)  T(F): 98.2 (21 Feb 2023 04:37), Max: 98.2 (21 Feb 2023 04:37)  HR: 82 (21 Feb 2023 04:37) (78 - 95)  BP: 121/66 (21 Feb 2023 04:37) (113/92 - 121/69)  BP(mean): 82 (21 Feb 2023 04:37) (82 - 82)  RR: 18 (21 Feb 2023 04:37) (18 - 18)  SpO2: 96% (21 Feb 2023 04:37) (96% - 100%)    Parameters below as of 21 Feb 2023 04:37  Patient On (Oxygen Delivery Method): nasal cannula  O2 Flow (L/min): 6 Vital Signs Last 24 Hrs  T(C): 36.8 (21 Feb 2023 04:37), Max: 36.8 (21 Feb 2023 04:37)  T(F): 98.2 (21 Feb 2023 04:37), Max: 98.2 (21 Feb 2023 04:37)  HR: 82 (21 Feb 2023 04:37) (78 - 95)  BP: 121/66 (21 Feb 2023 04:37) (113/92 - 121/69)  BP(mean): 82 (21 Feb 2023 04:37) (82 - 82)  RR: 18 (21 Feb 2023 04:37) (18 - 18)  SpO2: 96% (21 Feb 2023 04:37) (96% - 100%)    Parameters below as of 21 Feb 2023 04:37  Patient On (Oxygen Delivery Method): nasal cannula  O2 Flow (L/min): 6    GENERAL: No acute distress, elderly frail appearing   HEAD:  Atraumatic, Normocephalic  ENT: EOMI, PERRLA, conjunctiva and sclera clear,  moist mucosa no pharyngeal erythema or exudates   NECK: supple , no JVD   CHEST/LUNG: Clear to auscultation bilaterally; + bibasilar crackles ,  No wheeze, equal breath sounds bilaterally   BACK: No spinal tenderness,  No CVA tenderness   HEART: Regular rate and rhythm; No murmurs, rubs, or gallops  ABDOMEN: Soft, Nontender, Nondistended; Bowel sounds present  EXTREMITIES:  No clubbing, cyanosis, 1+ pitting  edema b/l   MSK: No joint swelling or effusions, ROM intact   PSYCH: Normal behavior/affect  NEUROLOGY: AAOx3, non-focal, cranial nerves intact  SKIN: Normal color, No rashes or lesions

## 2023-02-21 NOTE — ED ADULT NURSE NOTE - CAS TRG GENERAL AIRWAY, MLM
Patent Mohs Histo Method Verbiage: Each section was then chromacoded and processed in the Mohs lab using the Mohs protocol and submitted for frozen section.

## 2023-02-21 NOTE — ED PROVIDER NOTE - PHYSICAL EXAMINATION
GENERAL: Awake, alert, NAD  HEENT: NC/AT, moist mucous membranes, PERRL, EOMI  LUNGS: Tachypneic, diffuse crackles.   CARDIAC: RRR, no m/r/g  ABDOMEN: Soft, normal BS, non tender, non distended, no rebound, no guarding  BACK: No midline spinal tenderness, no CVA tenderness  EXT: No edema, no calf tenderness, 2+ DP pulses bilaterally, no deformities.  NEURO: A&Ox3. Moving all extremities.  SKIN: Warm and dry. No rash.  PSYCH: Normal affect.

## 2023-02-21 NOTE — ED PROVIDER NOTE - NS ED ROS FT
CONST: no fevers, no chills  EYES: no pain, no vision changes  ENT: no sore throat, no ear pain, no change in hearing  CV: no chest pain, no leg swelling  RESP: +shortness of breath, +cough  ABD: no abdominal pain, no nausea, no vomiting, no diarrhea  : no dysuria, no flank pain, no hematuria  MSK: no back pain, no extremity pain  NEURO: no headache or additional neurologic complaints  HEME: no easy bleeding  SKIN:  no rash

## 2023-02-21 NOTE — PROGRESS NOTE ADULT - CONVERSATION DETAILS
Lives alone,  since October after 66 years.  Has 2 kids, son Rubio is her emergency contact.  He does her food shopping along with an aid that comes once weekly to shop and help with laundry.  Relies on heat up foods from  jo[employer] mainly.  She wants to live and see her grand kids grow up. Feels like she had been very healthy until January.

## 2023-02-21 NOTE — H&P ADULT - HISTORY OF PRESENT ILLNESS
The patient is an 88yo female with a PMH of Afib (on Eliquis), CHF, hypothyroidism, HTN, recent hospitalization ( [facility])in January ’23 for covid ,  who presents  for worsened shortness of breath for the past day.    The patient is an 86yo female with a PMH of hypothyroidism, HTN, recent hospitalization ( [facility])in January ’23 for covid and newly diagnosed heart failure c/b newly diagnosed  afib on amiodarone and eliquis; she now presents for worsened shortness of breath for the past day.   Patient reports sudden onset coughing spell with non productive cough , associated with shortness of breath on evening of admission. She reports intermittent episodes of dyspnea when laying down associated with midsternal chest pressure , occurring over the past several nights, as well as 4 pillow orthopnea , she also report minimal lower extremity edema . She has no chest pain and no palpitations , no fever or chills.   Patient Per EMS report, patient was hypoxic to the mid 80s  started on CPAP en-route , also given Lasix and nitroglycerin.

## 2023-02-21 NOTE — ED ADULT NURSE NOTE - NSICDXPASTSURGICALHX_GEN_ALL_CORE_FT
PAST SURGICAL HISTORY:  Cataract cortical, senile s/p extraction right eye 12/13    History of abdominal hysterectomy 1970    History of appendectomy 1939    S/P breast biopsy Right breast benign cyst removed.    S/P thyroidectomy 1981

## 2023-02-21 NOTE — ED PROVIDER NOTE - CADM POA CENTRAL LINE
baja levothyroxine hasta 150 mcg al truman  Recheck TSH in 2 meses    estudios para los henry en las piernas    nanette equis de hombro hoy        
No

## 2023-02-21 NOTE — ED ADULT NURSE NOTE - NSFALLRSKPASTHIST_ED_ALL_ED
"Note for return visit for Dermato-Allergy       Encounter Date: Jan 11, 2021    History of Present Illness:  Marialuisa Gongora is a 60 year old female who presents in person to the consult following information has been take directly from the prior notes, patients informations, Epic and/or other sources and exam/history performed by myself:     Patient here for tests as planned    Additional comments and observations from review of the patient s chart including the following:    See notes for more details    ROS: Patient generally feeling well today   Physical Examination:  General: Well-appearing, appropriately-developed individual.  - Skin: Focused examination of the skin on face and back within the consultation was performed  On back no signs for eczema  picture from end December in the evening (starting 8pm) with erythema and infiltration over cheek area and forehead and by 11pm gone. But sometimes it stayed days.  As above in HPI. No additional skin concerns.  - upper respiratory tract: currently no obvious Rhinitis  - lungs: no signs for active and present Asthma/Wheezing or coughing  - eyes: currently no active conjunctivits  - GI system/digestion: currently no problems, no bloating or Diarrhoea      Earlier History and Allergy exams:    Reports that she first noticed the rash back in April 2019. Was on her face and anterior neck. Would come and go. Unclear trigger with intermittent response to topicals. Had a biopsy from the neck which revealed \"9/3/19: spongiotic dermatitis with occasional eosinophils\". Underwent extensive patch testing by Dr. Gamez in December 2019 which revealed no strong or mild reactions and doubtful reactions to cetrimonium chloride, methenamine (formaldehyde), formaldehyde 1% (suspected irritants). Since patch testing, has had fluctuation of symptoms. Had some improvement with prednisone, but required higher doses to maintain. Had initial improvement with dupixent, but now losing " "efficacy. Notes that back in February, she was in [place] for two weeks and no rash then, but upon [place] to the [place] due to [place] concerns, she had a recurrence of the rash    Currently, described the rash as red, hot, burning, and associated with pain. Only using water and vaseline. Worsened with eucrisa. Denies any other areas affected by rash. Denies any muscle weakness or difficulty walking up/down stairs. Denies any dysphagia. No new medications    Previous patch testing with Dr. Gamez \"patch testing with 287 allergens, including the NACDG standard series, in addition to our corticosteroid, personal care product (general, shampoo/soaps), preservatives, emulsifiers, acrylates, hairdressing series, perfumes, sunscreen panels, as well as select other allergens and personal products.\" [place] codes: 124HT2DHN and [id number]    Past Medical History:   Patient Active Problem List   Diagnosis     Controlled substance agreement signed     Spinal stenosis of lumbar region     S/P lumbar spinal fusion     History of anorexia nervosa     Elevated BP without diagnosis of hypertension     Past Medical History:   Diagnosis Date     Displacement of cervical intervertebral disc      Elevated BP without diagnosis of hypertension      Herpes labialis 04/01/2016     History of anorexia nervosa      History of bilateral mastectomy      Infiltrating ductal carcinoma of left breast (H) 2012     Lyme disease     2012     Pneumothorax, right 12/11/2016       Allergy History:     Allergies   Allergen Reactions     Codeine Itching     Baclofen Other (See Comments)     Minocycline      Other Environmental Allergy      Patch Test Results 12-10-19    Very Strong/Strong  None    Mild  None    Borderline (poessible irritation, may not be allergic)  Centrimonium chloride  Methenamine (formaldehyde)  Formaldehyde     Penicillins Hives     5-23 had hives as a childhood rxn - HAS HAD CSN in the past without problem  THROAT SWELLS AND HIVES       Sulfa " "Drugs Unknown       Social History:  The patient works. Patient has the following hobbies or non-occupational exposure: has gone to [facility]     reports that she has never smoked. She has never used smokeless tobacco.      Family History:  No family history on file.    Medications:  Current Outpatient Medications   Medication Sig Dispense Refill     acyclovir (ZOVIRAX) 400 MG tablet Take 400 mg by mouth       [START ON 1/11/2021] amitriptyline (ELAVIL) 10 MG tablet For allergy testing 1 tablet 0     amitriptyline (ELAVIL) 100 MG tablet        Calcium-Magnesium-Vitamin D 400-166.7-133.3 MG-MG-UNIT TABS        celecoxib (CELEBREX) 200 MG capsule Take 1 capsule (200 mg) by mouth 2 times daily 60 capsule 1     COMPOUNDED NON-CONTROLLED SUBSTANCE (CMPD RX) - PHARMACY TO MIX COMPOUNDED MEDICATION [facility] \"COPPER SULFATE 1%, ZINC SULFATE 1.5% AND CAMPHOR ANTISEPTIC(FCP-8700): dilute stock solution 1:10 in water, keep diluted solution in fridge and use 1-2x daily for compresses 500 mL 3     Desoximetasone (TOPICORT LP) 0.05 % OINT Externally apply topically 2 times daily as needed 60 g 3     Dupilumab (DUPIXENT) 300 MG/2ML syringe Inject 2 mLs (300 mg) Subcutaneous every 14 days for 10 doses 4 mL 2     Dupilumab (DUPIXENT) 300 MG/2ML syringe Inject 2 mLs (300 mg) Subcutaneous every 10 days for 8 doses 4 mL 2     Dupilumab (DUPIXENT) 300 MG/2ML syringe Inject 2 mLs (300 mg) Subcutaneous every 14 days 4 mL 2     DUPIXENT 300 MG/2ML syringe        EUCRISA 2 % ointment SEBLE EXT AA BID       fluocinolone (SYNALAR) 0.025 % ointment SEBLE AA BID       [START ON 1/11/2021] gabapentin (NEURONTIN) 100 MG capsule For allergy testing 1 capsule 0     gabapentin (NEURONTIN) 300 MG capsule TK ONE C PO  BID AND 2 CS PO ONCE D FOR TOTAL DOSE OF 4 CS DAILY       HYDROcodone-acetaminophen (NORCO) 5-325 MG tablet Take 1 tablet by mouth       mometasone (ELOCON) 0.1 % external cream Apply topically daily 45 g 1     pimecrolimus (ELIDEL) 1 " % external cream Apply topically 2 times daily 60 g 3     [START ON 1/11/2021] progesterone (PROMETRIUM) 200 MG capsule For allergy testing 1 capsule 0     progesterone (PROMETRIUM) 200 MG capsule        QUEtiapine (SEROQUEL) 100 MG tablet TK SS TO ONE T PO D       [START ON 1/11/2021] QUEtiapine (SEROQUEL) 200 MG tablet For allergy testing 1 tablet 0     tacrolimus (PROTOPIC) 0.1 % external ointment APPLY EXTERNALLY BID       triamcinolone (KENALOG) 0.1 % external cream        triamcinolone (KENALOG) 0.1 % external ointment SEBLE EXT TO ARMS BID FOR 14 DAYS       valACYclovir (VALTREX) 500 MG tablet Take 500 mg by mouth                       Allergy Tests:    Past Allergy Test    Future Allergy Test: 12/8/2020     Order for PATCH TESTS    [x] Outpatient  [] Inpatient: James..../ Bed ....      Skin Atopy (atopic dermatitis) [] Yes   [x] No  Contact allergies:   [] Yes   [x] No  Urticaria/Angioedema  [] Yes   [x] No  Rhinitis/Sinusitis:   [] Yes   [x] No   [] seasonal [] perennial    [] plants?   [] grasses?   [] other?  Allergic Asthma:   [] Yes   [x] No  Pets :                 [x] Yes   [] No - dog  Food Allergy:   [] Yes   [x] No  Drug allergies:               [] Yes   [] No - maybe  Leg ulcers:   [] Yes   [x] No  Hand eczema:   [] Yes   [x] No   Leading hand:     [x] R       [] L       [] Ambidextrous           Reason for tests (suspected allergy): diagnosis of facial erythema (airborne vs photo)  Known previous allergies: no anaphylaxis allergens  Standardized panels  [x] Standard panel (40 tests)  [] Preservatives & Antimicrobials (31 tests)  [] Emulsifiers & Additives (25 tests)   [x] Perfumes/Flavours & Plants (25 tests)  [] Hairdresser panel (12 tests)  [] Rubber Chemicals (22 tests)  [] Plastics (26 tests)  [] Colorants/Dyes/Food additives (20 tests)  [] Metals (implants/dental) (24 tests)  [x] Local anaesthetics/NSAIDs (13 tests) ==> NSAIDs only and do in double with and without UVA exposure  [] Antibiotics &  Antimycotics (14 tests)   [] Corticosteroids (15 tests)   [x] Photopatch test (62 tests)   [x] others:  Seroquel, Gabapentin, Amitryptilin, Progesteron with and without UV exposure      [x] Patient's own products: eucrisa, elidel, ibuprofen/acetaminophen without exposure to UVA    DO NOT test if chemical or biological identity is unknown!     always ask from patient the product information and safety sheets (MSDS)   [x] Atopy screen prick test (Atopic predisposition): ...    Standardized prick panels  [x] Atopic panel (20 tests)  [] Pediatric Panel (12 tests)  [] Milk, Meat, Eggs, Grains (20 tests)   [] Dust, Epithelia, Feathers (10 tests)  [] Fish, Seafood, Shellfish (17 tests)  [] Nuts, Beans (14 tests)  [] Spice, Vegetable, Fruit (17 tests)  [] Others: ...      [] Patient's own products: ...    DO NOT test if chemical or biological identity is unknown!     always ask from patient the product information and safety sheets (MSDS)     Standardized intradermal tests  [x] Penicillium notatum [x] Aspergillus fumigatus [x] House dust mites  [] Bee venom   [] Wasp venom !!Specific protocol with dilutions!!  [] Others: ...    [] Patient needs consultation with Allergy team (changes of tests may apply)  [] Tests discussed with Allergy team (can have direct appointment for allergy tests)     RESULTS & EVALUATION of PATCH TESTS    Patch test readings after     [x] 2 days, [] 3 days [x] 4 days, [] 5 days,    Applied patch tests with results (import here the list of patch tests):    STANDARD Series      No Substance 2 days 4 days remarks   1 Ganga Mix [C] - -    2 Colophony - -    3  2-Mercaptobenzothiazole  - -     4 Methylisothiazolinone - -    5 Carba Mix - -    6 Thiuram Mix [A] - -    7 Bisphenol A Epoxy Resin - -    8 W-Szln-Uiyltbfrmtf-Formaldehyde Resin - -    9 Mercapto Mix [A] - -    10 Black Rubber Mix- PPD [B] - -    11 Potassium Dichromate  -  -    12 Balsam of [place] (Myroxylon Pereirae Resin) - -    13 Nickel  Sulphate Hexahydrate - -    14 Mixed Dialkyl Thiourea - -    15 Paraben Mix [B] - -    16 Methyldibromo Glutaronitrile - -    17 Fragrance Mix - -    18 2-Bromo-2-Nitropropane-1,3-Diol (Bronopol) - -    19 Lyral - -    20 Tixocortol-21- Pivalate - -    21 Diazolidiyl Urea (Germall II) - -    22 Methyl Methacrylate - -    23 Cobalt (II) Chloride Hexahydrate - -    24 Fragrance Mix II  - -    25 Compositae Mix - -    26 Benzoyl Peroxide - -    27 Bacitracin - -    28 Formaldehyde - -    29 Methylchloroisothiazolinone / Methylisothiazolinone - -    30 Corticosteroid Mix - -    31 Sodium Lauryl Sulfate - -    32 Lanolin Alcohol - -    33 Turpentine - -    34 Cetylstearylalcohol - -    35 Chlorhexidine Dicluconate - -    36 Budenoside - -    37 Imidazolidinyl Urea  - -    38 Ethyl-2 Cyanoacrylate - -    39 Quaternium 15 (Dowicil 200) - -    40 Decyl Glucoside - -      PERFUMES, FLAVORS & PLANTS      No Substance 2 days 4 days remarks   41 1 Benzyl Cinnamate - -    42 2 Di-Limonene (Dipentene) - -    43 3 Cananga Odorata (Alphonse Cunha) (I) - -    44 4 Lichen Acid Mix - -    45 5 Mentha Piperita Oil (Peppermint Oil) - -    46 6 Sesquiterpenelactone mix - -    47 7 Tea Tree Oil, Oxidized - -    48 8 Wood Tar Mix - -    49 9 Abietic Acid - -    50 10 Lavendula Angustifolia Oil (Lavender Oil) - -    51 11 Camphor  - -      Fragrance Mix I      52 12 Oakmoss Absolute - -    53 13 Eugenol - -    54 14 Geraniol - -    55 15 Hydroxycitronellal - -    56 16 Isoeugenol - -    57 17 Cinnamic Aldehyde - -    58 18 Cinnamic Alcohol  - -      Fragrance mix II      59 19 Citronellol - -    60 20 Alpha-Hexylcinnamic Aldehyde    - -    61 21 Citral - -    62 22 Farnesol - -    63 23 Coumarin - -      LOCAL ANESTHETICS / NSAIDs    No Substance 2 days 4 days remarks     NSAIDs      64 8 Diclofenac - -    65 9 Ibuprofen - -    66 10 Ketoprofen - -    67 11 Paracetamol - -    68 12 Acetylsalicylic acid - -    69 13 Peroxicam      70 14  Naproxen - -      PHOTOPATCH tests (unexposed)      No Substance 2 days 4 days remarks   71 1 3,4,4'-Triclocarban - -    72 2 Coumarin - -    73 3 Bithiniol - -    74 4 Usnic Acid - -    75 5 Compositae Mix - -    76 6 Wood Tar Mix - -    77 7 Balsam of [place] (Myroxylon Pereirae Resin) - -    78 8 Hexachlorophene - -    79 9 Chlorhexidine Digluconate - -    80 10 Triclosan - -    81 11 Fragrance Mix - -    82 12 Ketoprofen  - -    83 13 Lichen Acid Mix - -    84 14 Musk Ambrette - -      Sunscreens (UV filters)  -    85 15 P-Aminobenzoic Acid - -    86 16 2-Hydroxy-4-Methoxy Benzophenone (Oxybenzone) - -    87 17 Tert-Butyl-Methoxydibenzoyl Methane  - -    88 18 3-4-Mehyl Benzyliden Camphor  - -    89 19 2-Ethylhexyl-P (Dimethylamino) Benzoate - -    90 20 0-Xbfdopvd-4-Methoxy-4-Methyl Benzophenone - -    91 21 Benzyl Salicylate - -    92 22 Isoamyl-4-Methoxycinnamate - -    93 23 Phenyl Benzimidazole - 5 - Sulfonic Acid - -    94 24 1-Gvrcxxyvadgsg-8-Hydroxybenzoyl-Benzoic Acid Hexyl Melinda - -    95 25 Menthyl Anthranilate (Meradimate) - -    96 26 Bis-Ethylhexyloxyphenol-Methoxy Phenyl Triazine - -    97 27 Diethylhexyl Butamido Triazone - -    98 28 Disodium Phenyldibenzimidazole Tetrasulfonate - -    99 29 Octocrylene - -    100 30 Octyl Triazone - -    101 31 Tinsorb (Methylene - Bis - Benzotriazyl Tetramethylbutylphenol) - -        Patch Tests   as is  as is 1:2 paraffin 1:2 paraffin       Substance Conc  days  days  days  days remarks   102 103 Seroquel         104 105 Gabapentin         106 107 Amitryptilin         108 109 Progesterone                      OTHER PRODUCTS      No Substance Conc  % solv 2 days 4 days remarks   110 1           PHOTOPATCH tests (exposed)      No Substance 2 days 4 days remarks   111 1 3,4,4'-Triclocarban - -    112 2 Coumarin - -    113 3 Bithiniol - -    114 4 Usnic Acid - -    115 5 Compositae Mix - -    116 6 Wood Tar Mix - -    117 7 Balsam of [place] (Myroxylon Pereirae Resin)  - -    118 8 Hexachlorophene - -    119 9 Chlorhexidine Digluconate - -    120 10 Triclosan - -    121 11 Fragrance Mix - -    122 12 Ketoprofen  - -    123 13 Lichen Acid Mix - -    124 14 Musk Ambrette - -      Sunscreens (UV filters)  -    125 15 P-Aminobenzoic Acid - -    126 16 2-Hydroxy-4-Methoxy Benzophenone (Oxybenzone) - -    127 17 Tert-Butyl-Methoxydibenzoyl Methane  - -    128 18 3-4-Mehyl Benzyliden Camphor  - -    129 19 2-Ethylhexyl-P (Dimethylamino) Benzoate - -    130 20 9-Rgexpvpk-6-Methoxy-4-Methyl Benzophenone - -    131 21 Benzyl Salicylate - -    132 22 Isoamyl-4-Methoxycinnamate - -    133 23 Phenyl Benzimidazole - 5 - Sulfonic Acid - -    134 24 3-Vohkhzgnrrgyx-1-Hydroxybenzoyl-Benzoic Acid Hexyl Melinda - -    135 25 Menthyl Anthranilate (Meradimate) - -    136 26 Bis-Ethylhexyloxyphenol-Methoxy Phenyl Triazine - -    137 27 Diethylhexyl Butamido Triazone - -    138 28 Disodium Phenyldibenzimidazole Tetrasulfonate - -    139 29 Octocrylene - -    140 30 Octyl Triazone - -    141 31 Tinsorb (Methylene - Bis - Benzotriazyl Tetramethylbutylphenol) - -    Procedure: Apply the same photopatch series 2  times on the back. Remove one patch side for irradiation after 1 day and then irradiate this site with 10 J/cm2 UVA. Remove the other, non-irradiated patch sites on day 2.   Maybe perform on these patients as well standardized UV-B and UV-A MED tests.      Patch Tests   as is  as is 1:2 paraffin 1:2 paraffin       Substance Conc  days  days  days  days remarks   142 143 Seroquel         144 145 Gabapentin         146 147 Amitryptilin         148 149 Progesterone                      OTHER PRODUCTS      No Substance Conc  % solv 2 days 4 days remarks   150 1           Results of patch tests:                         Interpretation:    - Negative                    A    = Allergic      (+) Erythema    TI   = Toxic/irritant   + E + Infiltration    RaP = Relevance at Present     ++ E/I +  Papulovesicle   Rpr  = Relevance Previously     +++ E/I/P + Blister     nR   = No Relevance      Atopy Screen 1/11/2021    No Substance Readings (15 min) Evaluation   POS Histamine 1mg/ml +    NEG NaCl 0.9% -      No Substance Readings (15 min) Evaluation   1 Alternaria alternata (tenuis)  -    2 Cladosporium herbarum -    3 Aspergillus fumigatus -    4 Penicillium notatum -    5 Dermatophagoides pteronyssinus -    6 Dermatophagoides farinae -    7 Dog epithelium (canis spp) -    8 Cat hair (bridget catus) -    9 Cockroach   (Blatella americana & germanica) -    10 Grass mix midwest   (Deedee, Orchard, Redtop, Joe) -    11 Raul grass (sorghum halepense) -    12 Weed mix   (common Cocklebur, Lamb s quarters, rough redroot Pigweed, Dock/Sorrel) -    13 Mug wort (artemisia vulgare) -    14 Ragweed giant/short (ambrosia spp) -    15 English Plantain (plantago lanceolata) -    16 Tree mix 1 (Pecan, Maple BHR, Oak RVW, american Orla, black [place]) -    17 Red cedar (juniperus virginia) -    18 Tree mix 2   (white Giorgi, river/red Birch, black [place], common Bourbon, american Elm) +    19 Box elder/Maple mix (acer spp) -    20 Cotton shagbark (carya ovata) -       -      Conclusion: no clear sign for atopy. Slight reaction to tree pollens, but not sure.      Intradermal Testing (Placed 01/11/21 )    No Substance Conc.  Readings (15min) Evaluation   -          + Histamine (prick) 0.1mg / ml -    DF Standard Dust Mite - D. Farinae 1:10 - 3mmP/no E   DP Standard Dust Mite - D. Pteronyssinus 1:10 (+) 4mmP/E   A Aspergillus fumigatus  1:10 - 3mmP/no E   P Penicillium notatum 1:10 - 3mmP/no E     Conclusion: no immediate reaction. Lets see if any delayed reaction    [] No relevant allergic reaction observed    [] Allergic reaction diagnosed against following allergens:      Interpretation/ remarks:   See later    [] Patient information given   [] ACDS CAMP information's (# ....) to following compounds: .....   []  General information's to following compounds: ......    ==> final Diagnosis:    1. 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 Possible atopic predisposition  Based off H&P, presenting condition does appear consistent with an allergic contact dermatitis (photo vs airborne). Will plan for patch testing for these conditions with atopic prick/intradermal screening. Will also test patient's medication  Plan   - Will plan for testing (see above)    - Recommended to also bring home medications   - last oral steroids in November 2020   - [facility] no improvement   - no improvement on topical mometasone and Elidel   - Dupixent since June = some stabilization, but no major improvement     - Photodocumentation obtained in clinic   - Prescribed celecoxib 200mg BID PRN for low back pain    These conclusions are made at the best of one's knowledge and belief based on the provided evidence such as patient's history and allergy test results and they can change over time or can be incomplete because of missing information's.    ==> Treatment prescribed/Plan:  See later      Follow-up: in Derm-Allergy clinic for 1st readings of patch tests after 2 days (virtual) and 2nd readings and final conclusions after 4 days (in person)      Thank you for the opportunity be involved in the care of this patient.     Staff:  Aditi Mukherjee LPN and Bertha Amin RN      I spent a total of 39 minutes face to face with Marialuisa Gongora during today s office visit. Over 50% of this time was spent discussing all the individual test results, correlating them to the clinical relevance, counseling the patient and/or coordinating care. Please see Assessment and Plan for details. This excludes any time spent performing patch tests   no

## 2023-02-21 NOTE — PROGRESS NOTE ADULT - SUBJECTIVE AND OBJECTIVE BOX
[facility] Division of Hospital Medicine  Dennise Lobo MD  Pager (M-F, 1Y-5P): [phone]  Other Times:  [phone]    Patient is a 87y old  Female who presents with a chief complaint of SOB x 1 day (2023 05:06)      SUBJECTIVE / OVERNIGHT EVENTS:  feeling better since admission.  Had bad cough at home and couldn't cath her breath, didn't realize she was filling with fluid because her feet didn't swell.  Now breathing normal, urinating well, and only notes some pleuritic chest pain with deep inspiration.    ADDITIONAL REVIEW OF SYSTEMS:    MEDICATIONS  (STANDING):  aMIOdarone    Tablet 200 milliGRAM(s) Oral daily  apixaban 2.5 milliGRAM(s) Oral every 12 hours  famotidine    Tablet 20 milliGRAM(s) Oral daily  furosemide   Injectable 20 milliGRAM(s) IV Push two times a day  levothyroxine 112 MICROGram(s) Oral daily  losartan 25 milliGRAM(s) Oral daily    MEDICATIONS  (PRN):  acetaminophen     Tablet .. 650 milliGRAM(s) Oral every 6 hours PRN Temp greater or equal to 38C (100.4F), Mild Pain (1 - 3)  melatonin 3 milliGRAM(s) Oral at bedtime PRN Insomnia  ondansetron Injectable 4 milliGRAM(s) IV Push every 8 hours PRN Nausea and/or Vomiting      CAPILLARY BLOOD GLUCOSE        I&O's Summary      PHYSICAL EXAM:  Vital Signs Last 24 Hrs  T(C): 37.1 (2023 13:02), Max: 37.1 (2023 13:02)  T(F): 98.7 (2023 13:02), Max: 98.7 (2023 13:02)  HR: 70 (2023 13:02) (66 - 95)  BP: 123/91 (2023 13:02) (103/62 - 123/91)  BP(mean): 73 (2023 11:14) (73 - 82)  RR: 20 (2023 13:02) (13 - 20)  SpO2: 97% (2023 13:02) (96% - 100%)    Parameters below as of 2023 13:02  Patient On (Oxygen Delivery Method): nasal cannula,2      GENERAL: No acute distress, elderly frail appearing   HEAD:  Atraumatic, Normocephalic  ENT: EOMI, PERRLA, conjunctiva and sclera clear,  moist mucosa no pharyngeal erythema or exudates   NECK: supple , no JVD   CHEST/LUNG: Clear to auscultation bilaterally; + bibasilar crackles ,  No wheeze, equal breath sounds bilaterally   BACK: No spinal tenderness,  No CVA tenderness   HEART: Regular rate and rhythm; No murmurs, rubs, or gallops  ABDOMEN: Soft, Nontender, Nondistended; Bowel sounds present  EXTREMITIES:  No clubbing, cyanosis, 1+ pitting  edema b/l   MSK: No joint swelling or effusions, ROM intact   PSYCH: Normal behavior/affect  NEUROLOGY: AAOx3, non-focal, cranial nerves intact  SKIN: Normal color, No rashes or lesions    LABS:                        12.0   11.99 )-----------( 203      ( 2023 06:19 )             37.6         140  |  101  |  23  ----------------------------<  141<H>  3.5   |  25  |  0.93    Ca    8.8      2023 05:28  Mg     2.5         TPro  7.1  /  Alb  3.9  /  TBili  0.4  /  DBili  <0.1  /  AST  22  /  ALT  24  /  AlkPhos  64  -    PT/INR - ( 2023 00:23 )   PT: 13.6 sec;   INR: 1.18 ratio         PTT - ( 2023 00:23 )  PTT:30.1 sec      Urinalysis Basic - ( 2023 00:47 )    Color: Colorless / Appearance: Clear / S.007 / pH: x  Gluc: x / Ketone: Negative  / Bili: Negative / Urobili: Negative   Blood: x / Protein: Trace / Nitrite: Negative   Leuk Esterase: Negative / RBC: x / WBC x   Sq Epi: x / Non Sq Epi: x / Bacteria: x          RADIOLOGY & ADDITIONAL TESTS:  Results Reviewed:   Imaging Personally Reviewed:  Electrocardiogram Personally Reviewed:    COORDINATION OF CARE:  Care Discussed with Consultants/Other Providers [Y/N]:  Prior or Outpatient Records Reviewed [Y/N]:

## 2023-02-21 NOTE — H&P ADULT - NSHPLABSRESULTS_GEN_ALL_CORE
Labs personally reviewed:                          12.7   11. )-----------( 245      ( 2023 00:23 )             41.0         138  |  101  |  21  ----------------------------<  227<H>  6.5<HH>   |  19<L>  |  0.97    Ca    8.8      2023 00:23  Mg     2.5         TPro  7.7  /  Alb  3.9  /  TBili  0.2  /  DBili  x   /  AST  59<H>  /  ALT  33  /  AlkPhos  62          LIVER FUNCTIONS - ( 2023 00:23 )  Alb: 3.9 g/dL / Pro: 7.7 g/dL / ALK PHOS: 62 U/L / ALT: 33 U/L / AST: 59 U/L / GGT: x           PT/INR - ( 2023 00:23 )   PT: 13.6 sec;   INR: 1.18 ratio         PTT - ( 2023 00:23 )  PTT:30.1 sec  Urinalysis Basic - ( 2023 00:47 )    Color: Colorless / Appearance: Clear / S.007 / pH: x  Gluc: x / Ketone: Negative  / Bili: Negative / Urobili: Negative   Blood: x / Protein: Trace / Nitrite: Negative   Leuk Esterase: Negative / RBC: x / WBC x   Sq Epi: x / Non Sq Epi: x / Bacteria: x      CAPILLARY BLOOD GLUCOSE          Imaging:  CXR personally reviewed: pulm edema     EKG personally reviewed: normal sinus rhythm , LBBB Patient

## 2023-02-21 NOTE — ED PROVIDER NOTE - CLINICAL SUMMARY MEDICAL DECISION MAKING FREE TEXT BOX
87-year-old female past medical history of hypothyroidism status post thyroidectomy, atrial fibrillation presenting with acute onset shortness of breath and cough.

## 2023-02-21 NOTE — ED PROVIDER NOTE - PROGRESS NOTE DETAILS
Tory Calix PGY-3: Reassessed. Patient feels improved. Exam with less crackles. VBG with improvement. FI02 reduced to 60%. CCU consulted for acute decompensated HF. Cardiology has evaluated pt at bedside. Currently on trial off BiPAP. No Chest pain. Pt has ASA allergy. No heparin ggt at this time per cards. Trend troponin. Tele admit. RICK.

## 2023-02-21 NOTE — ED ADULT NURSE NOTE - OBJECTIVE STATEMENT
88 yo female presents to the ED AAOx3 with complaints of diff breathing x multiple days, worsening  today prompting ems arrival. PMH hypothyroidism, afib. Pt endorsing today pt had acute onset of SOB, cough developing today. Per son at bedside, pt had been complaining of SOB over last couple days. EMS states on arrival, pt was 60% SPO2 on RA. Placed on CPAP with improvement to 80s. Per EMS, 20 lasix, Nitro was given PTA. Pt was endorsing CP with EMS but currently feeling improvement s/p nitro. Denies headache, dizziness, vision changes, chest pain, , abdominal pain, nausea, vomiting, diarrhea, fevers, chills, dysuria, hematuria, recent illness travel or fall.

## 2023-02-21 NOTE — H&P ADULT - NSHPOUTPATIENTPROVIDERS_GEN_ALL_CORE
Dr. Adkins [phone]  Dr. Fink, Cardiologist [phone];   Dr. Bush, Endocrinologist [phone]

## 2023-02-21 NOTE — CONSULT NOTE ADULT - ASSESSMENT
The patient is an 86yo female with a PMH of Afib (on Eliquis), CHF (unknown EF), hypothyroidism, HTN who presents to the ED from home with dyspnea (including with exertion), coughing, and recent weight gain. Sx concerning for acute decompensated HF.     Impression and recommendations   - EKG without acute ischemic changes, chronic LBBB  - Telemetry with sinus rhythm HR 70s  - Although reported per EMS,  Pt denies any symptoms of chest pain, low suspicion for ACS especially given normal cath in January according to the patient   - Lasix 20mg IV BID  - Pt has had significant improvement in her symptoms as well as VBG, advised trial off BiPAP; pt transitioned to NC and tolerating well   - Please obtain complete medication reconciliation with dosages as well as cardiology records from [facility]   - Continue home Amiodarone and Eliquis for AF  - Strict I/Os and weights  - Telemetry   - Echocardiogram     Lali Toledo MD  Cardiology Fellow     Recommendations are preliminary until cosigned by attending  The patient is an 88yo female with a PMH of Afib (on Eliquis), CHF (unknown EF), hypothyroidism, HTN who presents to the ED from home with dyspnea (including with exertion), coughing, and recent weight gain. Sx concerning for acute decompensated HF.     Impression and recommendations   - EKG without acute ischemic changes, chronic LBBB  - Telemetry with sinus rhythm HR 70s  - Although reported per EMS,  Pt denies any symptoms of chest pain, low suspicion for ACS especially given normal cath in January according to the patient   - Troponin 37 --> 77, BNP 3170, K+ 6.5 (flagged as severe hemolysis)  - Lasix 20mg IV BID  - Pt has had significant improvement in her symptoms as well as VBG, advised trial off BiPAP; pt transitioned to NC and tolerating well   - Please obtain complete medication reconciliation with dosages as well as cardiology records from [facility]   - Continue home Amiodarone and Eliquis for AF  - Strict I/Os and weights  - Telemetry   - Echocardiogram     Lali Toledo MD  Cardiology Fellow     Recommendations are preliminary until cosigned by attending  88 yo F with a PMH of Afib (on Eliquis), CHF (unknown EF), & HTN.  Hospitalized twice at [facility] in January, the first time with tachycardia and the second with COVID.  She reports that angiogram at that time did not show blockages and that an echo at that time demonstrated that her "heart was weak."    Per report from the ED, the pt was given Lasix, nitro and started on CPAP in the field.  She improvement after these treatments.      Impression and recommendations   Presents to the ED from home with dyspnea (including with exertion), coughing, and recent weight gain. Sx. concerning for acute decompensated HF; BNP mildly elevated, CXR without evidence of CHF.   EKG without acute ischemic changes, chronic LBBB.  Telemetry with sinus rhythm HR 70s  Although reported per EMS,  Pt denies any symptoms of chest pain, low suspicion for ACS especially given normal cath in January according to the patient; Troponin 37 --> 77    Would   - give Lasix 20mg IV BID  - Pt has had significant improvement in her symptoms as well as VBG, advised trial off BiPAP; pt transitioned to N/C and tolerating well   - Please obtain complete medication reconciliation with dosages as well as cardiology records from [facility]   - Continue home Amiodarone and Eliquis for AF  - Strict I/Os and weights  - Telemetry   - Echocardiogram       Lali Toledo MD  Cardiology Fellow     Plan discussed with cardiology fellow.  Patient seen and examined.  Hx., exam and labs as above.  I agree with the assessment and recommendations, which I have reviewed and edited where appropriate.  Kishore Coelho M.D.  Cardiology Attending, Consult Service    For Cardiology consults and questions, all Cardiology service information can be found 24/7 on [email or web address] - use password: [email or web address] to log in.

## 2023-02-22 LAB
ANION GAP SERPL CALC-SCNC: 14 MMOL/L — SIGNIFICANT CHANGE UP (ref 5–17)
ANION GAP SERPL CALC-SCNC: 15 MMOL/L — SIGNIFICANT CHANGE UP (ref 5–17)
BUN SERPL-MCNC: 20 MG/DL — SIGNIFICANT CHANGE UP (ref 7–23)
BUN SERPL-MCNC: 25 MG/DL — HIGH (ref 7–23)
CALCIUM SERPL-MCNC: 8.8 MG/DL — SIGNIFICANT CHANGE UP (ref 8.4–10.5)
CALCIUM SERPL-MCNC: 9.6 MG/DL — SIGNIFICANT CHANGE UP (ref 8.4–10.5)
CHLORIDE SERPL-SCNC: 100 MMOL/L — SIGNIFICANT CHANGE UP (ref 96–108)
CHLORIDE SERPL-SCNC: 101 MMOL/L — SIGNIFICANT CHANGE UP (ref 96–108)
CO2 SERPL-SCNC: 22 MMOL/L — SIGNIFICANT CHANGE UP (ref 22–31)
CO2 SERPL-SCNC: 27 MMOL/L — SIGNIFICANT CHANGE UP (ref 22–31)
CREAT SERPL-MCNC: 0.9 MG/DL — SIGNIFICANT CHANGE UP (ref 0.5–1.3)
CREAT SERPL-MCNC: 0.98 MG/DL — SIGNIFICANT CHANGE UP (ref 0.5–1.3)
EGFR: 56 ML/MIN/1.73M2 — LOW
EGFR: 62 ML/MIN/1.73M2 — SIGNIFICANT CHANGE UP
GLUCOSE SERPL-MCNC: 155 MG/DL — HIGH (ref 70–99)
GLUCOSE SERPL-MCNC: 90 MG/DL — SIGNIFICANT CHANGE UP (ref 70–99)
HCT VFR BLD CALC: 36.5 % — SIGNIFICANT CHANGE UP (ref 34.5–45)
HGB BLD-MCNC: 11.6 G/DL — SIGNIFICANT CHANGE UP (ref 11.5–15.5)
MAGNESIUM SERPL-MCNC: 2 MG/DL — SIGNIFICANT CHANGE UP (ref 1.6–2.6)
MCHC RBC-ENTMCNC: 28.7 PG — SIGNIFICANT CHANGE UP (ref 27–34)
MCHC RBC-ENTMCNC: 31.8 GM/DL — LOW (ref 32–36)
MCV RBC AUTO: 90.3 FL — SIGNIFICANT CHANGE UP (ref 80–100)
NRBC # BLD: 0 /100 WBCS — SIGNIFICANT CHANGE UP (ref 0–0)
PHOSPHATE SERPL-MCNC: 3.7 MG/DL — SIGNIFICANT CHANGE UP (ref 2.5–4.5)
PLATELET # BLD AUTO: 238 K/UL — SIGNIFICANT CHANGE UP (ref 150–400)
POTASSIUM SERPL-MCNC: 2.9 MMOL/L — CRITICAL LOW (ref 3.5–5.3)
POTASSIUM SERPL-MCNC: 4 MMOL/L — SIGNIFICANT CHANGE UP (ref 3.5–5.3)
POTASSIUM SERPL-SCNC: 2.9 MMOL/L — CRITICAL LOW (ref 3.5–5.3)
POTASSIUM SERPL-SCNC: 4 MMOL/L — SIGNIFICANT CHANGE UP (ref 3.5–5.3)
RBC # BLD: 4.04 M/UL — SIGNIFICANT CHANGE UP (ref 3.8–5.2)
RBC # FLD: 14.8 % — HIGH (ref 10.3–14.5)
SODIUM SERPL-SCNC: 138 MMOL/L — SIGNIFICANT CHANGE UP (ref 135–145)
SODIUM SERPL-SCNC: 141 MMOL/L — SIGNIFICANT CHANGE UP (ref 135–145)
WBC # BLD: 6.32 K/UL — SIGNIFICANT CHANGE UP (ref 3.8–10.5)
WBC # FLD AUTO: 6.32 K/UL — SIGNIFICANT CHANGE UP (ref 3.8–10.5)

## 2023-02-22 PROCEDURE — 99233 SBSQ HOSP IP/OBS HIGH 50: CPT | Mod: GC

## 2023-02-22 PROCEDURE — 99232 SBSQ HOSP IP/OBS MODERATE 35: CPT

## 2023-02-22 RX ORDER — POTASSIUM CHLORIDE 20 MEQ
40 PACKET (EA) ORAL EVERY 4 HOURS
Refills: 0 | Status: COMPLETED | OUTPATIENT
Start: 2023-02-22 | End: 2023-02-22

## 2023-02-22 RX ORDER — CHLORHEXIDINE GLUCONATE 213 G/1000ML
1 SOLUTION TOPICAL
Refills: 0 | Status: DISCONTINUED | OUTPATIENT
Start: 2023-02-22 | End: 2023-02-25

## 2023-02-22 RX ORDER — POTASSIUM CHLORIDE 20 MEQ
40 PACKET (EA) ORAL ONCE
Refills: 0 | Status: DISCONTINUED | OUTPATIENT
Start: 2023-02-22 | End: 2023-02-22

## 2023-02-22 RX ADMIN — Medication 40 MILLIEQUIVALENT(S): at 11:29

## 2023-02-22 RX ADMIN — CHLORHEXIDINE GLUCONATE 1 APPLICATION(S): 213 SOLUTION TOPICAL at 11:39

## 2023-02-22 RX ADMIN — Medication 40 MILLIEQUIVALENT(S): at 08:12

## 2023-02-22 RX ADMIN — Medication 112 MICROGRAM(S): at 07:00

## 2023-02-22 RX ADMIN — APIXABAN 2.5 MILLIGRAM(S): 2.5 TABLET, FILM COATED ORAL at 17:18

## 2023-02-22 RX ADMIN — FAMOTIDINE 20 MILLIGRAM(S): 10 INJECTION INTRAVENOUS at 11:32

## 2023-02-22 RX ADMIN — LOSARTAN POTASSIUM 25 MILLIGRAM(S): 100 TABLET, FILM COATED ORAL at 07:01

## 2023-02-22 RX ADMIN — AMIODARONE HYDROCHLORIDE 200 MILLIGRAM(S): 400 TABLET ORAL at 07:00

## 2023-02-22 RX ADMIN — APIXABAN 2.5 MILLIGRAM(S): 2.5 TABLET, FILM COATED ORAL at 07:01

## 2023-02-22 RX ADMIN — Medication 20 MILLIGRAM(S): at 07:00

## 2023-02-22 RX ADMIN — Medication 20 MILLIGRAM(S): at 13:59

## 2023-02-22 NOTE — PROGRESS NOTE ADULT - ASSESSMENT
87yoF PMH hypothyroidism, HTN,  newly diagnosed heart failure and afib with COVID in January 2023 (hospitalized at [facility]) presents 2/21 with SOB x 1 day c/w acute on chronic decompensated heart failure exacerbation with acute respiratory failure requiring bipap transitioned to NC after diuresis.

## 2023-02-22 NOTE — PHYSICAL THERAPY INITIAL EVALUATION ADULT - PERTINENT HX OF CURRENT PROBLEM, REHAB EVAL
86yo female with a PMH of hypothyroidism, HTN, recent hospitalization ( [facility])in January ’23 for covid and newly diagnosed heart failure c/b newly diagnosed  afib on amiodarone and eliquis; she now presents for worsened shortness of breath for the past day. Pt reports sudden onset coughing spell with non productive cough , associated with shortness of breath. She reports intermittent episodes of dyspnea when laying down associated with midsternal chest pressure , occurring over the past several nights, as well as 4 pillow orthopnea , she also report minimal lower extremity edema . She has no chest pain and no palpitations , no fever or chills. Patient Per EMS report, patient was hypoxic to the mid 80s  started on CPAP en-route , also given Lasix and nitroglycerin. Hospital course: 2/21 CXR: Faint upper lung right-sided predominant patchy opacities, possibly pneumonia. 2/21 ECG (1): normal sinus rhythm Non-specific intra-ventricular conduction block. Cannot rule out Septal infarct , age undetermined T wave abnormality, consider lateral ischemia. 2/21 ECG (2): NORMAL SINUS RHYTHM  LEFT AXIS DEVIATION. LEFT BUNDLE BRANCH BLOCK. ABNORMAL ECG. WHEN COMPARED WITH ECG OF 25-OCT-2001 18:32,SIGNIFICANT CHANGES HAVE OCCURRED. LEFT BUNDLE BRANCH BLOCK , NEW 88yo female with a PMH of hypothyroidism, HTN, recent hospitalization ( [facility])in January ’23 for covid and newly diagnosed heart failure c/b newly diagnosed  afib on amiodarone and eliquis; she now presents for worsened shortness of breath for the past day. Pt reports sudden onset coughing spell with non productive cough , associated with shortness of breath. She reports intermittent episodes of dyspnea when laying down associated with midsternal chest pressure , occurring over the past several nights, as well as 4 pillow orthopnea , she also report minimal lower extremity edema . She has no chest pain and no palpitations , no fever or chills. Patient Per EMS report, patient was hypoxic to the mid 80s  started on CPAP en-route , also given Lasix and nitroglycerin. Hospital course: 2/21 CXR: Faint upper lung right-sided predominant patchy opacities, possibly pneumonia. 2/21 ECG (1): normal sinus rhythm Non-specific intra-ventricular conduction block. Cannot rule out Septal infarct , age undetermined T wave abnormality, consider lateral ischemia. 2/21 ECG (2): NORMAL SINUS RHYTHM LEFT AXIS DEVIATION. LEFT BUNDLE BRANCH BLOCK. ABNORMAL ECG. WHEN COMPARED WITH ECG OF 25-OCT-2001. TTE 2/23: Mitral annular calcification. Tethered mitral valve leaflets with normal opening. Moderate mitral regurgitation.  Mildly dilated left atrium.  LA volume index = 39 cc/m2. Normal left ventricular internal dimensions and wall thicknesses. Moderate global left ventricular systolic dysfunction. LVEF is 35%. Small loculated pericardial effusion mostly around the right atrium.

## 2023-02-22 NOTE — PROGRESS NOTE ADULT - SUBJECTIVE AND OBJECTIVE BOX
Cardiology Progress Note  ------------------------------------------------------------------------------------------  SUBJECTIVE:   No events overnight.  Patient feels better with improvement in her breathing.   -------------------------------------------------------------------------------------------  ROS:  CV: chest pain (-), palpitation (-), orthopnea (-), PND (-), edema (-)  PULM: SOB (-), cough (-), wheezing (-), hemoptysis (-).   CONST: fever (-), chills (-) or fatigue (-)  GI: abdominal distension (-), abdominal pain (-) , nausea/vomiting (-), hematemesis, (-), melena (-), hematochezia (-)  : dysuria (-), frequency (-), hematuria (-).   NEURO: numbness (-), weakness (-), dizziness (-)  SKIN: itching (-), rash (-)  HEENT:  visual changes (-); vertigo or throat pain (-);  neck stiffness (-)     All other review of systems is negative unless indicated above.   -------------------------------------------------------------------------------------------  VS:  T(F): 97.8 (02-22), Max: 98.1 (02-21)  HR: 64 (02-22) (64 - 78)  BP: 103/63 (02-22) (103/63 - 125/75)  RR: 18 (02-22)  SpO2: 92% (02-22)  I&O's Summary    21 Feb 2023 07:01  -  22 Feb 2023 07:00  --------------------------------------------------------  IN: 0 mL / OUT: 900 mL / NET: -900 mL    22 Feb 2023 07:01  -  22 Feb 2023 15:06  --------------------------------------------------------  IN: 530 mL / OUT: 400 mL / NET: 130 mL      ------------------------------------------------------------------------------------------  PHYSICAL EXAM:  GENERAL: NAD  HEAD:  Atraumatic, Normocephalic.  EYES: EOMI, PERRLA, conjunctiva and sclera clear.  ENT: Moist mucous membranes.  NECK: Supple, +JVD  CHEST/LUNG: Clear to auscultation bilaterally; No rales, rhonchi, wheezing, or rubs. Unlabored respirations.  HEART: Regular rate and rhythm; No murmurs, rubs, or gallops.  ABDOMEN: Bowel sounds present; Soft, Nontender, Nondistended.   EXTREMITIES:  2+ Peripheral Pulses, brisk capillary refill. No clubbing, cyanosis, or edema.  PSYCH: Normal affect.  SKIN: No rashes or lesions.  -------------------------------------------------------------------------------------------  LABS:                          11.6   6.32  )-----------( 238      ( 22 Feb 2023 05:49 )             36.5     02-22    141  |  100  |  20  ----------------------------<  90  2.9<LL>   |  27  |  0.90    Ca    8.8      22 Feb 2023 05:48  Phos  3.7     02-22  Mg     2.0     02-22    TPro  7.1  /  Alb  3.9  /  TBili  0.4  /  DBili  <0.1  /  AST  22  /  ALT  24  /  AlkPhos  64  02-21    PT/INR - ( 21 Feb 2023 00:23 )   PT: 13.6 sec;   INR: 1.18 ratio         PTT - ( 21 Feb 2023 00:23 )  PTT:30.1 sec  CARDIAC MARKERS ( 21 Feb 2023 08:16 )  73 ng/L / x     / x     / x     / x     / x      CARDIAC MARKERS ( 21 Feb 2023 01:33 )  77 ng/L / x     / x     / x     / x     / x      CARDIAC MARKERS ( 21 Feb 2023 00:23 )  37 ng/L / x     / x     / x     / x     / x        -------------------------------------------------------------------------------------------  Meds:  acetaminophen     Tablet .. 650 milliGRAM(s) Oral every 6 hours PRN  aMIOdarone    Tablet 200 milliGRAM(s) Oral daily  apixaban 2.5 milliGRAM(s) Oral every 12 hours  chlorhexidine 2% Cloths 1 Application(s) Topical <User Schedule>  famotidine    Tablet 20 milliGRAM(s) Oral daily  furosemide   Injectable 20 milliGRAM(s) IV Push two times a day  levothyroxine 112 MICROGram(s) Oral daily  losartan 25 milliGRAM(s) Oral daily  melatonin 3 milliGRAM(s) Oral at bedtime PRN  ondansetron Injectable 4 milliGRAM(s) IV Push every 8 hours PRN    -------------------------------------------------------------------------------------------   Cardiology Progress Note  ------------------------------------------------------------------------------------------  SUBJECTIVE:   No events overnight.  Patient feels better with improvement in her breathing.   -------------------------------------------------------------------------------------------    Meds:  acetaminophen     Tablet .. 650 milliGRAM(s) Oral every 6 hours PRN  aMIOdarone    Tablet 200 milliGRAM(s) Oral daily  apixaban 2.5 milliGRAM(s) Oral every 12 hours  chlorhexidine 2% Cloths 1 Application(s) Topical <User Schedule>  famotidine    Tablet 20 milliGRAM(s) Oral daily  furosemide   Injectable 20 milliGRAM(s) IV Push two times a day  levothyroxine 112 MICROGram(s) Oral daily  losartan 25 milliGRAM(s) Oral daily  melatonin 3 milliGRAM(s) Oral at bedtime PRN  ondansetron Injectable 4 milliGRAM(s) IV Push every 8 hours PRN    -------------------------------------------------------------------------------------------  ROS:  CV: chest pain (-), palpitation (-), orthopnea (-), PND (-), edema (-)  PULM: SOB (-), cough (-), wheezing (-), hemoptysis (-).   CONST: fever (-), chills (-) or fatigue (-)  GI: abdominal distension (-), abdominal pain (-) , nausea/vomiting (-), hematemesis, (-), melena (-), hematochezia (-)  : dysuria (-), frequency (-), hematuria (-).   NEURO: numbness (-), weakness (-), dizziness (-)  SKIN: itching (-), rash (-)  HEENT:  visual changes (-); vertigo or throat pain (-);  neck stiffness (-)     All other review of systems is negative unless indicated above.   -------------------------------------------------------------------------------------------  VS:  T(F): 97.8 (02-22), Max: 98.1 (02-21)  HR: 64 (02-22) (64 - 78)  BP: 103/63 (02-22) (103/63 - 125/75)  RR: 18 (02-22)  SpO2: 92% (02-22)    PHYSICAL EXAM:  GENERAL: NAD  HEAD:  Atraumatic, Normocephalic.  EYES: EOMI, PERRLA, conjunctiva and sclera clear.  ENT: Moist mucous membranes.  NECK: Supple, +JVD  CHEST/LUNG: Clear to auscultation bilaterally; No rales, rhonchi, wheezing, or rubs. Unlabored respirations.  HEART: Regular rate and rhythm; No murmurs, rubs, or gallops.  ABDOMEN: Bowel sounds present; Soft, Nontender, Nondistended.   EXTREMITIES:  2+ Peripheral Pulses, brisk capillary refill. No clubbing, cyanosis, or edema.  PSYCH: Normal affect.  SKIN: No rashes or lesions.    I&O's Summary    21 Feb 2023 07:01  -  22 Feb 2023 07:00  --------------------------------------------------------  IN: 0 mL / OUT: 900 mL / NET: -900 mL    22 Feb 2023 07:01  -  22 Feb 2023 15:06  --------------------------------------------------------  IN: 530 mL / OUT: 400 mL / NET: 130 mL      LABS:                     11.6   6.32  )-----------( 238      ( 22 Feb 2023 05:49 )             36.5     02-22  141  |  100  |  20  ----------------------------<  90  2.9<LL>   |  27  |  0.90    Ca    8.8      22 Feb 2023 05:48  Phos  3.7     02-22  Mg     2.0     02-22    TPro  7.1  /  Alb  3.9  /  TBili  0.4  /  DBili  <0.1  /  AST  22  /  ALT  24  /  AlkPhos  64  02-21    PT/INR - ( 21 Feb 2023 00:23 )   PT: 13.6 sec;   INR: 1.18 ratio    PTT - ( 21 Feb 2023 00:23 )  PTT:30.1 sec    CARDIAC MARKERS ( 21 Feb 2023 08:16 )  73 ng/L / x     / x     / x     / x     / x      CARDIAC MARKERS ( 21 Feb 2023 01:33 )  77 ng/L / x     / x     / x     / x     / x      CARDIAC MARKERS ( 21 Feb 2023 00:23 )  37 ng/L / x     / x     / x     / x     / x

## 2023-02-22 NOTE — PHYSICAL THERAPY INITIAL EVALUATION ADULT - ADDITIONAL COMMENTS
Patient reports that she lives alone in apartment building that does not have any steps to enter and has elevator accessibility. She notes that she occasionally uses a walker when ambulating long distances. She also has a HHA once a week for laundry, cleaning, and grocery shopping.

## 2023-02-22 NOTE — PROGRESS NOTE ADULT - SUBJECTIVE AND OBJECTIVE BOX
[facility] Division of Hospital Medicine  Dennise Lobo MD  Pager (M-F, 8A-5P): [phone]  Other Times:  [phone]    Patient is a 87y old  Female who presents with a chief complaint of SOB x 1 day (2023 13:31)      SUBJECTIVE / OVERNIGHT EVENTS:  feeling overall improved today  less SOB, still some pleuritic CP with deep inspiration  no CP    ADDITIONAL REVIEW OF SYSTEMS:    MEDICATIONS  (STANDING):  aMIOdarone    Tablet 200 milliGRAM(s) Oral daily  apixaban 2.5 milliGRAM(s) Oral every 12 hours  chlorhexidine 2% Cloths 1 Application(s) Topical <User Schedule>  famotidine    Tablet 20 milliGRAM(s) Oral daily  furosemide   Injectable 20 milliGRAM(s) IV Push two times a day  levothyroxine 112 MICROGram(s) Oral daily  losartan 25 milliGRAM(s) Oral daily    MEDICATIONS  (PRN):  acetaminophen     Tablet .. 650 milliGRAM(s) Oral every 6 hours PRN Temp greater or equal to 38C (100.4F), Mild Pain (1 - 3)  melatonin 3 milliGRAM(s) Oral at bedtime PRN Insomnia  ondansetron Injectable 4 milliGRAM(s) IV Push every 8 hours PRN Nausea and/or Vomiting      CAPILLARY BLOOD GLUCOSE        I&O's Summary    2023 07:  -  2023 07:00  --------------------------------------------------------  IN: 0 mL / OUT: 900 mL / NET: -900 mL    2023 07:01  -  2023 14:59  --------------------------------------------------------  IN: 530 mL / OUT: 400 mL / NET: 130 mL        PHYSICAL EXAM:  Vital Signs Last 24 Hrs  T(C): 36.6 (2023 10:43), Max: 36.7 (2023 23:50)  T(F): 97.8 (2023 10:43), Max: 98.1 (2023 23:50)  HR: 64 (2023 10:43) (64 - 78)  BP: 103/63 (2023 10:43) (103/63 - 125/75)  BP(mean): --  RR: 18 (2023 10:43) (18 - 20)  SpO2: 92% (2023 10:43) (92% - 98%)    Parameters below as of 2023 10:43  Patient On (Oxygen Delivery Method): nasal cannula  O2 Flow (L/min): 2      GENERAL: No acute distress, elderly frail appearing   HEAD:  Atraumatic, Normocephalic  ENT: EOMI, PERRLA, conjunctiva and sclera clear,  moist mucosa no pharyngeal erythema or exudates   NECK: supple , no JVD   CHEST/LUNG: Clear to auscultation bilaterally; + bibasilar crackles ,  No wheeze, equal breath sounds bilaterally   BACK: No spinal tenderness,  No CVA tenderness   HEART: Regular rate and rhythm; No murmurs, rubs, or gallops  ABDOMEN: Soft, Nontender, Nondistended; Bowel sounds present  EXTREMITIES:  No clubbing, cyanosis, 1+ pitting  edema b/l   MSK: No joint swelling or effusions, ROM intact   PSYCH: Normal behavior/affect  NEUROLOGY: AAOx3, non-focal, cranial nerves intact  SKIN: Normal color, No rashes or lesions    LABS:                        11.6   6.32  )-----------( 238      ( 2023 05:49 )             36.5     -    141  |  100  |  20  ----------------------------<  90  2.9<LL>   |  27  |  0.90    Ca    8.8      2023 05:48  Phos  3.7       Mg     2.0         TPro  7.1  /  Alb  3.9  /  TBili  0.4  /  DBili  <0.1  /  AST  22  /  ALT  24  /  AlkPhos  64  02-21    PT/INR - ( 2023 00:23 )   PT: 13.6 sec;   INR: 1.18 ratio         PTT - ( 2023 00:23 )  PTT:30.1 sec      Urinalysis Basic - ( 2023 00:47 )    Color: Colorless / Appearance: Clear / S.007 / pH: x  Gluc: x / Ketone: Negative  / Bili: Negative / Urobili: Negative   Blood: x / Protein: Trace / Nitrite: Negative   Leuk Esterase: Negative / RBC: x / WBC x   Sq Epi: x / Non Sq Epi: x / Bacteria: x          RADIOLOGY & ADDITIONAL TESTS:  Results Reviewed:   Imaging Personally Reviewed:  Electrocardiogram Personally Reviewed:    COORDINATION OF CARE:  Care Discussed with Consultants/Other Providers [Y/N]:  Prior or Outpatient Records Reviewed [Y/N]:

## 2023-02-22 NOTE — PROGRESS NOTE ADULT - ASSESSMENT
86 yo F with a PMH of Afib (on Eliquis), CHF (unknown EF), & HTN.  Hospitalized twice at [facility] in January, the first time with tachycardia and the second with COVID.  She reports that angiogram at that time did not show blockages and that an echo at that time demonstrated that her "heart was weak."    Per report from the ED, the pt was given Lasix, nitro and started on CPAP in the field.  She improvement after these treatments.      Impression and recommendations   Presents to the ED from home with dyspnea (including with exertion), coughing, and recent weight gain. Sx. concerning for acute decompensated HF; BNP mildly elevated, CXR without evidence of CHF.   EKG without acute ischemic changes, chronic LBBB.  Telemetry with sinus rhythm HR 70s  Although reported per EMS,  Pt denies any symptoms of chest pain, low suspicion for ACS especially given normal cath in January according to the patient; Troponin 37 --> 77    Would   - Continue Lasix 20mg IV BID (still has elevated JVD)  - Please obtain complete medication reconciliation with dosages as well as cardiology records from [facility]   - Continue home Amiodarone and Eliquis for AF  - Strict I/Os and weights  - Telemetry   - Echocardiogram     Recommendations are preliminary until attending attestation.    Mario Clay MD  Cardiology Fellow- PGY 4 86 yo F with a PMH of Afib (on Eliquis), CHF (unknown EF), & HTN.  Hospitalized twice at [facility] in January, the first time with tachycardia and the second with COVID.  She reports that angiogram at that time did not show blockages and that an echo at that time demonstrated that her "heart was weak."    Presents to the ED from home with dyspnea (including with exertion), coughing, and recent weight gain. Sx. concerning for acute decompensated HF; BNP mildly elevated, CXR without evidence of CHF.   EKG without acute ischemic changes, chronic LBBB.  Telemetry with sinus rhythm HR 70s  Although reported per EMS,  Pt denies any symptoms of chest pain, low suspicion for ACS especially given normal cath in January according to the patient; Troponin 37 --> 77    Would   - Continue Lasix 20mg IV BID (still has elevated JVD)  - Please obtain complete medication reconciliation with dosages as well as cardiology records from [facility]   - Continue home Amiodarone and Eliquis for AF  - Strict I/Os and weights  - Telemetry   - Echocardiogram       Mario Clay MD  Cardiology Fellow- PGY 4    Plan discussed with cardiology fellow.  Patient seen and examined.  Hx., exam and labs as above.  I agree with the assessment and recommendations, which I have reviewed and edited where appropriate.  Kishore Coelho M.D.  Cardiology Attending, Consult Service    For Cardiology consults and questions, all Cardiology service information can be found 24/7 on [email or web address] - use password: [email or web address] to log in.

## 2023-02-23 LAB
ANION GAP SERPL CALC-SCNC: 14 MMOL/L — SIGNIFICANT CHANGE UP (ref 5–17)
BUN SERPL-MCNC: 25 MG/DL — HIGH (ref 7–23)
CALCIUM SERPL-MCNC: 9.6 MG/DL — SIGNIFICANT CHANGE UP (ref 8.4–10.5)
CHLORIDE SERPL-SCNC: 103 MMOL/L — SIGNIFICANT CHANGE UP (ref 96–108)
CO2 SERPL-SCNC: 25 MMOL/L — SIGNIFICANT CHANGE UP (ref 22–31)
CREAT SERPL-MCNC: 0.85 MG/DL — SIGNIFICANT CHANGE UP (ref 0.5–1.3)
EGFR: 67 ML/MIN/1.73M2 — SIGNIFICANT CHANGE UP
GLUCOSE SERPL-MCNC: 93 MG/DL — SIGNIFICANT CHANGE UP (ref 70–99)
HCT VFR BLD CALC: 42.5 % — SIGNIFICANT CHANGE UP (ref 34.5–45)
HGB BLD-MCNC: 13.4 G/DL — SIGNIFICANT CHANGE UP (ref 11.5–15.5)
MAGNESIUM SERPL-MCNC: 2.1 MG/DL — SIGNIFICANT CHANGE UP (ref 1.6–2.6)
MCHC RBC-ENTMCNC: 28.9 PG — SIGNIFICANT CHANGE UP (ref 27–34)
MCHC RBC-ENTMCNC: 31.5 GM/DL — LOW (ref 32–36)
MCV RBC AUTO: 91.8 FL — SIGNIFICANT CHANGE UP (ref 80–100)
MRSA PCR RESULT.: SIGNIFICANT CHANGE UP
NRBC # BLD: 0 /100 WBCS — SIGNIFICANT CHANGE UP (ref 0–0)
PHOSPHATE SERPL-MCNC: 3.9 MG/DL — SIGNIFICANT CHANGE UP (ref 2.5–4.5)
PLATELET # BLD AUTO: 264 K/UL — SIGNIFICANT CHANGE UP (ref 150–400)
POTASSIUM SERPL-MCNC: 3.9 MMOL/L — SIGNIFICANT CHANGE UP (ref 3.5–5.3)
POTASSIUM SERPL-SCNC: 3.9 MMOL/L — SIGNIFICANT CHANGE UP (ref 3.5–5.3)
RBC # BLD: 4.63 M/UL — SIGNIFICANT CHANGE UP (ref 3.8–5.2)
RBC # FLD: 14.9 % — HIGH (ref 10.3–14.5)
S AUREUS DNA NOSE QL NAA+PROBE: SIGNIFICANT CHANGE UP
SODIUM SERPL-SCNC: 142 MMOL/L — SIGNIFICANT CHANGE UP (ref 135–145)
WBC # BLD: 6.69 K/UL — SIGNIFICANT CHANGE UP (ref 3.8–10.5)
WBC # FLD AUTO: 6.69 K/UL — SIGNIFICANT CHANGE UP (ref 3.8–10.5)

## 2023-02-23 PROCEDURE — 99232 SBSQ HOSP IP/OBS MODERATE 35: CPT

## 2023-02-23 PROCEDURE — 93306 TTE W/DOPPLER COMPLETE: CPT | Mod: 26

## 2023-02-23 PROCEDURE — 99232 SBSQ HOSP IP/OBS MODERATE 35: CPT | Mod: 25,GC

## 2023-02-23 RX ORDER — FUROSEMIDE 40 MG
40 TABLET ORAL
Refills: 0 | Status: DISCONTINUED | OUTPATIENT
Start: 2023-02-23 | End: 2023-02-24

## 2023-02-23 RX ADMIN — FAMOTIDINE 20 MILLIGRAM(S): 10 INJECTION INTRAVENOUS at 12:08

## 2023-02-23 RX ADMIN — AMIODARONE HYDROCHLORIDE 200 MILLIGRAM(S): 400 TABLET ORAL at 06:24

## 2023-02-23 RX ADMIN — APIXABAN 2.5 MILLIGRAM(S): 2.5 TABLET, FILM COATED ORAL at 16:55

## 2023-02-23 RX ADMIN — CHLORHEXIDINE GLUCONATE 1 APPLICATION(S): 213 SOLUTION TOPICAL at 06:14

## 2023-02-23 RX ADMIN — LOSARTAN POTASSIUM 25 MILLIGRAM(S): 100 TABLET, FILM COATED ORAL at 06:24

## 2023-02-23 RX ADMIN — Medication 20 MILLIGRAM(S): at 06:25

## 2023-02-23 RX ADMIN — APIXABAN 2.5 MILLIGRAM(S): 2.5 TABLET, FILM COATED ORAL at 06:24

## 2023-02-23 RX ADMIN — Medication 30 MILLILITER(S): at 22:38

## 2023-02-23 RX ADMIN — Medication 20 MILLIGRAM(S): at 12:08

## 2023-02-23 RX ADMIN — Medication 112 MICROGRAM(S): at 06:24

## 2023-02-23 RX ADMIN — Medication 40 MILLIGRAM(S): at 16:51

## 2023-02-23 NOTE — PROGRESS NOTE ADULT - ASSESSMENT
87yoF PMH hypothyroidism, HTN,  newly diagnosed heart failure and afib with COVID in January 2023 (hospitalized at [facility]) presents 2/21 with SOB x 1 day c/w acute on chronic decompensated heart failure exacerbation with acute respiratory failure requiring bipap now on RA after diuresis, pending echo.

## 2023-02-23 NOTE — PROGRESS NOTE ADULT - SUBJECTIVE AND OBJECTIVE BOX
[facility] of [facility] Medicine  Dennise Lobo MD  Pager (M-F, 8A-5P): [phone]  Other Times:  [phone]    Patient is a 87y old  Female who presents with a chief complaint of SOB x 1 day (23 Feb 2023 11:19)      SUBJECTIVE / OVERNIGHT EVENTS:  no acute events overnight  reports feeling slightly lightheaded when she stood up to be weighed but had been lying flat for extended period for echo prior to this  no cp/sob    ADDITIONAL REVIEW OF SYSTEMS:    MEDICATIONS  (STANDING):  aMIOdarone    Tablet 200 milliGRAM(s) Oral daily  apixaban 2.5 milliGRAM(s) Oral every 12 hours  chlorhexidine 2% Cloths 1 Application(s) Topical <User Schedule>  famotidine    Tablet 20 milliGRAM(s) Oral daily  furosemide   Injectable 40 milliGRAM(s) IV Push two times a day  levothyroxine 112 MICROGram(s) Oral daily  losartan 25 milliGRAM(s) Oral daily    MEDICATIONS  (PRN):  acetaminophen     Tablet .. 650 milliGRAM(s) Oral every 6 hours PRN Temp greater or equal to 38C (100.4F), Mild Pain (1 - 3)  melatonin 3 milliGRAM(s) Oral at bedtime PRN Insomnia  ondansetron Injectable 4 milliGRAM(s) IV Push every 8 hours PRN Nausea and/or Vomiting      CAPILLARY BLOOD GLUCOSE        I&O's Summary    22 Feb 2023 07:01  -  23 Feb 2023 07:00  --------------------------------------------------------  IN: 530 mL / OUT: 1050 mL / NET: -520 mL    23 Feb 2023 07:01  -  23 Feb 2023 15:09  --------------------------------------------------------  IN: 120 mL / OUT: 400 mL / NET: -280 mL        PHYSICAL EXAM:  Vital Signs Last 24 Hrs  T(C): 36.6 (23 Feb 2023 11:23), Max: 36.8 (22 Feb 2023 21:15)  T(F): 97.8 (23 Feb 2023 11:23), Max: 98.2 (22 Feb 2023 21:15)  HR: 86 (23 Feb 2023 11:23) (57 - 86)  BP: 134/88 (23 Feb 2023 11:23) (106/54 - 134/88)  BP(mean): --  RR: 18 (23 Feb 2023 11:23) (18 - 18)  SpO2: 95% (23 Feb 2023 11:23) (95% - 95%)    Parameters below as of 23 Feb 2023 11:23  Patient On (Oxygen Delivery Method): room air      GENERAL: No acute distress, elderly frail appearing   HEAD:  Atraumatic, Normocephalic  ENT: EOMI, PERRLA, conjunctiva and sclera clear,  moist mucosa no pharyngeal erythema or exudates   NECK: supple , no JVD   CHEST/LUNG: Clear to auscultation bilaterally; decreased crackles today ,  No wheeze, equal breath sounds bilaterally   BACK: No spinal tenderness,  No CVA tenderness   HEART: Regular rate and rhythm; No murmurs, rubs, or gallops  ABDOMEN: Soft, Nontender, Nondistended; Bowel sounds present  EXTREMITIES:  No clubbing, cyanosis, 1+ pitting  edema b/l   MSK: No joint swelling or effusions, ROM intact   PSYCH: Normal behavior/affect  NEUROLOGY: AAOx3, non-focal, cranial nerves intact  SKIN: Normal color, No rashes or lesions    LABS:                        13.4   6.69  )-----------( 264      ( 23 Feb 2023 06:39 )             42.5     02-23    142  |  103  |  25<H>  ----------------------------<  93  3.9   |  25  |  0.85    Ca    9.6      23 Feb 2023 06:40  Phos  3.9     02-23  Mg     2.1     02-23                  RADIOLOGY & ADDITIONAL TESTS:  Results Reviewed:   Imaging Personally Reviewed:  Electrocardiogram Personally Reviewed:    COORDINATION OF CARE:  Care Discussed with Consultants/Other Providers [Y/N]:  Prior or Outpatient Records Reviewed [Y/N]:

## 2023-02-23 NOTE — PROGRESS NOTE ADULT - SUBJECTIVE AND OBJECTIVE BOX
Cardiology Progress Note  ------------------------------------------------------------------------------------------  SUBJECTIVE:   No events overnight. Denies CP, SOB or Palpitations.   -------------------------------------------------------------------------------------------  ROS:  CV: chest pain (-), palpitation (-), orthopnea (-), PND (-), edema (-)  PULM: SOB (-), cough (-), wheezing (-), hemoptysis (-).   CONST: fever (-), chills (-) or fatigue (-)  GI: abdominal distension (-), abdominal pain (-) , nausea/vomiting (-), hematemesis, (-), melena (-), hematochezia (-)  : dysuria (-), frequency (-), hematuria (-).   NEURO: numbness (-), weakness (-), dizziness (-)  SKIN: itching (-), rash (-)  HEENT:  visual changes (-); vertigo or throat pain (-);  neck stiffness (-)     All other review of systems is negative unless indicated above.   -------------------------------------------------------------------------------------------  VS:  T(F): 97.5 (02-23), Max: 98.2 (02-22)  HR: 66 (02-23) (57 - 72)  BP: 120/61 (02-23) (106/54 - 120/61)  RR: 18 (02-23)  SpO2: 95% (02-23)  I&O's Summary    22 Feb 2023 07:01  -  23 Feb 2023 07:00  --------------------------------------------------------  IN: 530 mL / OUT: 1050 mL / NET: -520 mL    23 Feb 2023 07:01  -  23 Feb 2023 11:19  --------------------------------------------------------  IN: 120 mL / OUT: 0 mL / NET: 120 mL      ------------------------------------------------------------------------------------------  PHYSICAL EXAM:  GENERAL: NAD  HEAD:  Atraumatic, Normocephalic.  EYES: EOMI, PERRLA, conjunctiva and sclera clear.  ENT: Moist mucous membranes.  NECK: Supple, +JVD  CHEST/LUNG: Clear to auscultation bilaterally; No rales, rhonchi, wheezing, or rubs. Unlabored respirations.  HEART: Regular rate and rhythm; No murmurs, rubs, or gallops.  ABDOMEN: Bowel sounds present; Soft, Nontender, Nondistended.   EXTREMITIES:  2+ Peripheral Pulses, brisk capillary refill. No clubbing, cyanosis, or edema.  PSYCH: Normal affect.  SKIN: No rashes or lesions.    -------------------------------------------------------------------------------------------  LABS:                          13.4   6.69  )-----------( 264      ( 23 Feb 2023 06:39 )             42.5     02-23    142  |  103  |  25<H>  ----------------------------<  93  3.9   |  25  |  0.85    Ca    9.6      23 Feb 2023 06:40  Phos  3.9     02-23  Mg     2.1     02-23    CARDIAC MARKERS ( 21 Feb 2023 08:16 )  73 ng/L / x     / x     / x     / x     / x      CARDIAC MARKERS ( 21 Feb 2023 01:33 )  77 ng/L / x     / x     / x     / x     / x      CARDIAC MARKERS ( 21 Feb 2023 00:23 )  37 ng/L / x     / x     / x     / x     / x        -------------------------------------------------------------------------------------------  Meds:  acetaminophen     Tablet .. 650 milliGRAM(s) Oral every 6 hours PRN  aMIOdarone    Tablet 200 milliGRAM(s) Oral daily  apixaban 2.5 milliGRAM(s) Oral every 12 hours  chlorhexidine 2% Cloths 1 Application(s) Topical <User Schedule>  famotidine    Tablet 20 milliGRAM(s) Oral daily  furosemide   Injectable 20 milliGRAM(s) IV Push two times a day  levothyroxine 112 MICROGram(s) Oral daily  losartan 25 milliGRAM(s) Oral daily  melatonin 3 milliGRAM(s) Oral at bedtime PRN  ondansetron Injectable 4 milliGRAM(s) IV Push every 8 hours PRN    -------------------------------------------------------------------------------------------   Cardiology Progress Note  ------------------------------------------------------------------------------------------  SUBJECTIVE:   No events overnight. Denies CP, SOB or Palpitations.   -------------------------------------------------------------------------------------------  ROS:  CV: chest pain (-), palpitation (-), orthopnea (-), PND (-), edema (-)  PULM: SOB (-), cough (-), wheezing (-), hemoptysis (-).   CONST: fever (-), chills (-) or fatigue (-)  GI: abdominal distension (-), abdominal pain (-) , nausea/vomiting (-), hematemesis, (-), melena (-), hematochezia (-)  : dysuria (-), frequency (-), hematuria (-).   NEURO: numbness (-), weakness (-), dizziness (-)  SKIN: itching (-), rash (-)  HEENT:  visual changes (-); vertigo or throat pain (-);  neck stiffness (-)     All other review of systems is negative unless indicated above.   -------------------------------------------------------------------------------------------    VS:  T(F): 97.5 (02-23), Max: 98.2 (02-22)  HR: 66 (02-23) (57 - 72)  BP: 120/61 (02-23) (106/54 - 120/61)  RR: 18 (02-23)  SpO2: 95% (02-23)      I&O's Summary  22 Feb 2023 07:01  -  23 Feb 2023 07:00  --------------------------------------------------------  IN: 530 mL / OUT: 1050 mL / NET: -520 mL    23 Feb 2023 07:01  -  23 Feb 2023 11:19  --------------------------------------------------------  IN: 120 mL / OUT: 0 mL / NET: 120 mL      ------------------------------------------------------------------------------------------  PHYSICAL EXAM:  GENERAL: NAD  HEAD:  Atraumatic, Normocephalic.  EYES: EOMI, PERRLA, conjunctiva and sclera clear.  ENT: Moist mucous membranes.  NECK: Supple, +JVD  CHEST/LUNG: Clear to auscultation bilaterally; No rales, rhonchi, wheezing, or rubs. Unlabored respirations.  HEART: Regular rate and rhythm; No murmurs, rubs, or gallops.  ABDOMEN: Bowel sounds present; Soft, Nontender, Nondistended.   EXTREMITIES:  2+ Peripheral Pulses, brisk capillary refill. No clubbing, cyanosis, or edema.  PSYCH: Normal affect.  SKIN: No rashes or lesions.    -------------------------------------------------------------------------------------------      LABS:                     13.4   6.69  )-----------( 264      ( 23 Feb 2023 06:39 )             42.5     02-23  142  |  103  |  25<H>  ----------------------------<  93  3.9   |  25  |  0.85    Ca    9.6      23 Feb 2023 06:40  Phos  3.9     02-23  Mg     2.1     02-23    CARDIAC MARKERS ( 21 Feb 2023 08:16 )  73 ng/L / x     / x     / x     / x     / x      CARDIAC MARKERS ( 21 Feb 2023 01:33 )  77 ng/L / x     / x     / x     / x     / x      CARDIAC MARKERS ( 21 Feb 2023 00:23 )  37 ng/L / x     / x     / x     / x     / x        -------------------------------------------------------------------------------------------  Meds:  acetaminophen     Tablet .. 650 milliGRAM(s) Oral every 6 hours PRN  aMIOdarone    Tablet 200 milliGRAM(s) Oral daily  apixaban 2.5 milliGRAM(s) Oral every 12 hours  chlorhexidine 2% Cloths 1 Application(s) Topical <User Schedule>  famotidine    Tablet 20 milliGRAM(s) Oral daily  furosemide   Injectable 20 milliGRAM(s) IV Push two times a day  levothyroxine 112 MICROGram(s) Oral daily  losartan 25 milliGRAM(s) Oral daily  melatonin 3 milliGRAM(s) Oral at bedtime PRN  ondansetron Injectable 4 milliGRAM(s) IV Push every 8 hours PRN    -------------------------------------------------------------------------------------------

## 2023-02-23 NOTE — PROGRESS NOTE ADULT - ASSESSMENT
86 yo F with a PMH of Afib (on Eliquis), CHF (unknown EF), & HTN.  Hospitalized twice at [facility] in January, the first time with tachycardia and the second with COVID.  She reports that angiogram at that time did not show blockages and that an echo at that time demonstrated that her "heart was weak."    Presents to the ED from home with dyspnea (including with exertion), coughing, and recent weight gain. Sx. concerning for acute decompensated HF; BNP mildly elevated, CXR without evidence of CHF.   EKG without acute ischemic changes, chronic LBBB.  Telemetry with sinus rhythm HR 70s  Although reported per EMS,  Pt denies any symptoms of chest pain, low suspicion for ACS especially given normal cath in January according to the patient; Troponin 37 --> 77    Would   - Increase lasix to 40mg IV BID (still has elevated JVD)  - Please obtain complete medication reconciliation with dosages as well as cardiology records from [facility]   - Continue home Amiodarone and Eliquis for AF  - Strict I/Os and weights  - Telemetry   - Still pending Echocardiogram       Mario Clay MD  Cardiology Fellow- PGY 4   88 yo F with a PMH of Afib (on Eliquis), CHF (unknown EF), & HTN.  Hospitalized twice at [facility] in January, the first time with tachycardia and the second with COVID.  She reports that angiogram at that time did not show blockages and that an echo at that time demonstrated that her "heart was weak."    Presents to the ED from home with dyspnea (including with exertion), coughing, and recent weight gain. Sx. concerning for acute decompensated HF; BNP mildly elevated, CXR without evidence of CHF.   EKG without acute ischemic changes, chronic LBBB.  Telemetry with sinus rhythm HR 70s  Although reported per EMS,  Pt denies any symptoms of chest pain, low suspicion for ACS especially given normal cath in January according to the patient; Troponin 37 --> 77    Would   - Increase Lasix to 40mg IV BID (still has elevated JVD)  - Please obtain complete medication reconciliation with dosages as well as cardiology records from [facility]   - Continue home Amiodarone and Eliquis for AF  - Strict I/Os and weights  - Telemetry   - Still pending Echocardiogram       Mario Clay MD  Cardiology Fellow- PGY 4    Plan discussed with cardiology fellow.  Patient seen and examined.  Hx., exam and labs as above.  I agree with the assessment and recommendations, which I have reviewed and edited where appropriate.  Kishore Coelho M.D.  Cardiology Attending, Consult Service    For Cardiology consults and questions, all Cardiology service information can be found 24/7 on [email or web address] - use password: [email or web address] to log in.

## 2023-02-24 LAB
ANION GAP SERPL CALC-SCNC: 13 MMOL/L — SIGNIFICANT CHANGE UP (ref 5–17)
BUN SERPL-MCNC: 32 MG/DL — HIGH (ref 7–23)
CALCIUM SERPL-MCNC: 9.5 MG/DL — SIGNIFICANT CHANGE UP (ref 8.4–10.5)
CHLORIDE SERPL-SCNC: 99 MMOL/L — SIGNIFICANT CHANGE UP (ref 96–108)
CO2 SERPL-SCNC: 28 MMOL/L — SIGNIFICANT CHANGE UP (ref 22–31)
CREAT SERPL-MCNC: 1.12 MG/DL — SIGNIFICANT CHANGE UP (ref 0.5–1.3)
EGFR: 48 ML/MIN/1.73M2 — LOW
GLUCOSE SERPL-MCNC: 99 MG/DL — SIGNIFICANT CHANGE UP (ref 70–99)
HCT VFR BLD CALC: 40.5 % — SIGNIFICANT CHANGE UP (ref 34.5–45)
HGB BLD-MCNC: 12.8 G/DL — SIGNIFICANT CHANGE UP (ref 11.5–15.5)
MAGNESIUM SERPL-MCNC: 2.1 MG/DL — SIGNIFICANT CHANGE UP (ref 1.6–2.6)
MCHC RBC-ENTMCNC: 28.8 PG — SIGNIFICANT CHANGE UP (ref 27–34)
MCHC RBC-ENTMCNC: 31.6 GM/DL — LOW (ref 32–36)
MCV RBC AUTO: 91 FL — SIGNIFICANT CHANGE UP (ref 80–100)
NRBC # BLD: 0 /100 WBCS — SIGNIFICANT CHANGE UP (ref 0–0)
PHOSPHATE SERPL-MCNC: 4.8 MG/DL — HIGH (ref 2.5–4.5)
PLATELET # BLD AUTO: 289 K/UL — SIGNIFICANT CHANGE UP (ref 150–400)
POTASSIUM SERPL-MCNC: 3.3 MMOL/L — LOW (ref 3.5–5.3)
POTASSIUM SERPL-SCNC: 3.3 MMOL/L — LOW (ref 3.5–5.3)
RBC # BLD: 4.45 M/UL — SIGNIFICANT CHANGE UP (ref 3.8–5.2)
RBC # FLD: 14.7 % — HIGH (ref 10.3–14.5)
SODIUM SERPL-SCNC: 140 MMOL/L — SIGNIFICANT CHANGE UP (ref 135–145)
WBC # BLD: 5.48 K/UL — SIGNIFICANT CHANGE UP (ref 3.8–10.5)
WBC # FLD AUTO: 5.48 K/UL — SIGNIFICANT CHANGE UP (ref 3.8–10.5)

## 2023-02-24 PROCEDURE — 99233 SBSQ HOSP IP/OBS HIGH 50: CPT | Mod: GC

## 2023-02-24 PROCEDURE — 99232 SBSQ HOSP IP/OBS MODERATE 35: CPT

## 2023-02-24 RX ORDER — POTASSIUM CHLORIDE 20 MEQ
20 PACKET (EA) ORAL ONCE
Refills: 0 | Status: COMPLETED | OUTPATIENT
Start: 2023-02-24 | End: 2023-02-24

## 2023-02-24 RX ORDER — CARVEDILOL PHOSPHATE 80 MG/1
3.12 CAPSULE, EXTENDED RELEASE ORAL EVERY 12 HOURS
Refills: 0 | Status: DISCONTINUED | OUTPATIENT
Start: 2023-02-24 | End: 2023-02-25

## 2023-02-24 RX ORDER — FUROSEMIDE 40 MG
40 TABLET ORAL DAILY
Refills: 0 | Status: DISCONTINUED | OUTPATIENT
Start: 2023-02-25 | End: 2023-02-25

## 2023-02-24 RX ORDER — INFLUENZA VIRUS VACCINE 15; 15; 15; 15 UG/.5ML; UG/.5ML; UG/.5ML; UG/.5ML
0.7 SUSPENSION INTRAMUSCULAR ONCE
Refills: 0 | Status: DISCONTINUED | OUTPATIENT
Start: 2023-02-24 | End: 2023-02-25

## 2023-02-24 RX ORDER — POTASSIUM CHLORIDE 20 MEQ
40 PACKET (EA) ORAL ONCE
Refills: 0 | Status: DISCONTINUED | OUTPATIENT
Start: 2023-02-24 | End: 2023-02-25

## 2023-02-24 RX ADMIN — AMIODARONE HYDROCHLORIDE 200 MILLIGRAM(S): 400 TABLET ORAL at 06:08

## 2023-02-24 RX ADMIN — FAMOTIDINE 20 MILLIGRAM(S): 10 INJECTION INTRAVENOUS at 20:47

## 2023-02-24 RX ADMIN — APIXABAN 2.5 MILLIGRAM(S): 2.5 TABLET, FILM COATED ORAL at 18:00

## 2023-02-24 RX ADMIN — Medication 112 MICROGRAM(S): at 06:09

## 2023-02-24 RX ADMIN — LOSARTAN POTASSIUM 25 MILLIGRAM(S): 100 TABLET, FILM COATED ORAL at 06:10

## 2023-02-24 RX ADMIN — APIXABAN 2.5 MILLIGRAM(S): 2.5 TABLET, FILM COATED ORAL at 06:09

## 2023-02-24 RX ADMIN — Medication 40 MILLIGRAM(S): at 06:09

## 2023-02-24 RX ADMIN — Medication 20 MILLIEQUIVALENT(S): at 10:05

## 2023-02-24 NOTE — PATIENT PROFILE ADULT - NSPROMEDSADMININFO_GEN_A_NUR
Regarding: [place]-ears hurt, chest is full of phlegm, hurts when he coughs, some difficulty breathing, exposed to  ----- Message from Yelena Peters sent at 12/14/2022  8:24 AM CST -----  Patient Name: Josh Bradley    Specialist or PCP Name: Clyde Alcala MD    Symptoms: ears hurt, chest is full of phlegm, hurts when he coughs, some difficulty breathing, exposed to Covid    Pregnant (females aged 13-60. If Yes, how long?) : no    Call Back # : [phone]    Which State are you currently located in?: [place]    Name of Clinic Site / Acct# : [facility] - [address]    Use following scripting for patients waiting for a callback:   \"Nurse callback times vary based on call volumes; please be aware the return phone call may come from an unidentified phone number. If your symptoms worsen or become life threatening while waiting, you should seek immediate assistance by calling 911 or going to the ER for evaluation.\"     no concerns

## 2023-02-24 NOTE — PROGRESS NOTE ADULT - SUBJECTIVE AND OBJECTIVE BOX
Cardiology Progress Note  ------------------------------------------------------------------------------------------  SUBJECTIVE:     S/p TTE which demonstrated reduced LV systolic function EF 35% wit moderate MR. Overall she feels well and was anxious about echo results gladis MR  -------------------------------------------------------------------------------------------  ROS:  CV: chest pain (-), palpitation (-), orthopnea (-), PND (-), edema (-)  PULM: SOB (-), cough (-), wheezing (-), hemoptysis (-).   CONST: fever (-), chills (-) or fatigue (-)  GI: abdominal distension (-), abdominal pain (-) , nausea/vomiting (-), hematemesis, (-), melena (-), hematochezia (-)  : dysuria (-), frequency (-), hematuria (-).   NEURO: numbness (-), weakness (-), dizziness (-)  SKIN: itching (-), rash (-)  HEENT:  visual changes (-); vertigo or throat pain (-);  neck stiffness (-)     All other review of systems is negative unless indicated above.   -------------------------------------------------------------------------------------------  VS:  T(F): 97.3 (02-24), Max: 97.9 (02-23)  HR: 76 (02-24) (59 - 86)  BP: 113/64 (02-24) (107/52 - 134/88)  RR: 18 (02-24)  SpO2: 100% (02-24)  I&O's Summary    23 Feb 2023 07:01  -  24 Feb 2023 07:00  --------------------------------------------------------  IN: 480 mL / OUT: 1400 mL / NET: -920 mL      ------------------------------------------------------------------------------------------  PHYSICAL EXAM:  GENERAL: NAD  HEAD:  Atraumatic, Normocephalic.  EYES: EOMI, PERRLA, conjunctiva and sclera clear.  ENT: Moist mucous membranes.  NECK: Supple, Still with persistent JVD  CHEST/LUNG: Clear to auscultation bilaterally; No rales, rhonchi, wheezing, or rubs. Unlabored respirations.  HEART: Regular rate and rhythm; No murmurs, rubs, or gallops.  ABDOMEN: Bowel sounds present; Soft, Nontender, Nondistended.   EXTREMITIES:  2+ Peripheral Pulses, brisk capillary refill. No clubbing, cyanosis, or edema.  PSYCH: Normal affect.  SKIN: No rashes or lesions.  -------------------------------------------------------------------------------------------  LABS:                          12.8   5.48  )-----------( 289      ( 24 Feb 2023 06:24 )             40.5     02-23    142  |  103  |  25<H>  ----------------------------<  93  3.9   |  25  |  0.85    Ca    9.6      23 Feb 2023 06:40  Phos  3.9     02-23  Mg     2.1     02-23    CARDIAC MARKERS ( 21 Feb 2023 08:16 )  73 ng/L / x     / x     / x     / x     / x      CARDIAC MARKERS ( 21 Feb 2023 01:33 )  77 ng/L / x     / x     / x     / x     / x      CARDIAC MARKERS ( 21 Feb 2023 00:23 )  37 ng/L / x     / x     / x     / x     / x        -------------------------------------------------------------------------------------------  Meds:  acetaminophen     Tablet .. 650 milliGRAM(s) Oral every 6 hours PRN  aMIOdarone    Tablet 200 milliGRAM(s) Oral daily  apixaban 2.5 milliGRAM(s) Oral every 12 hours  chlorhexidine 2% Cloths 1 Application(s) Topical <User Schedule>  famotidine    Tablet 20 milliGRAM(s) Oral daily  furosemide   Injectable 40 milliGRAM(s) IV Push two times a day  levothyroxine 112 MICROGram(s) Oral daily  losartan 25 milliGRAM(s) Oral daily  melatonin 3 milliGRAM(s) Oral at bedtime PRN  ondansetron Injectable 4 milliGRAM(s) IV Push every 8 hours PRN    -------------------------------------------------------------------------------------------    TTE  Conclusions:  1. Mitral annular calcification. Tethered mitral valve  leaflets with normal opening. Moderate mitral  regurgitation.  2. Normal trileaflet aortic valve. Minimal aortic  regurgitation.  3. Mildly dilated left atrium.  LA volume index = 39 cc/m2.  Normal left ventricular internal dimensions and wall  thicknesses.Moderate global left ventricular systolic  dysfunction. LVEF calculated using biplane Little's method  is 35%. Septal motion consistent with conduction defect.  Unable to determine diastolic function due to merged E and  A wave.  4. Normal right atrium. Normal right ventricular size and  function.  5. Normal tricuspid valve. Minimal tricuspid  regurgitation.Unable to estimate RVSP.  10. Small loculated pericardial effusion mostly around the  right atrium.   Cardiology Progress Note    ------------------------------------------------------------------------------------------  SUBJECTIVE:     S/p TTE which demonstrated reduced LV systolic function EF 35% with moderate MR.   Overall she feels well and was anxious about echo results gladis MR    -------------------------------------------------------------------------------------------  ROS:  CV: chest pain (-), palpitation (-), orthopnea (-), PND (-), edema (-)  PULM: SOB (-), cough (-), wheezing (-), hemoptysis (-).   CONST: fever (-), chills (-) or fatigue (-)  GI: abdominal distension (-), abdominal pain (-) , nausea/vomiting (-), hematemesis, (-), melena (-), hematochezia (-)  : dysuria (-), frequency (-), hematuria (-).   NEURO: numbness (-), weakness (-), dizziness (-)  SKIN: itching (-), rash (-)  HEENT:  visual changes (-); vertigo or throat pain (-);  neck stiffness (-)     All other review of systems is negative unless indicated above.   -------------------------------------------------------------------------------------------  VS:  T(F): 97.3 (02-24), Max: 97.9 (02-23)  HR: 76 (02-24) (59 - 86)  BP: 113/64 (02-24) (107/52 - 134/88)  RR: 18 (02-24)  SpO2: 100% (02-24)    ------------------------------------------------------------------------------------------  PHYSICAL EXAM:  GENERAL: NAD  HEAD:  Atraumatic, Normocephalic.  EYES: EOMI, PERRLA, conjunctiva and sclera clear.  ENT: Moist mucous membranes.  NECK: Supple, Still with persistent JVD  CHEST/LUNG: Clear to auscultation bilaterally; No rales, rhonchi, wheezing, or rubs. Unlabored respirations.  HEART: Regular rate and rhythm; No murmurs, rubs, or gallops.  ABDOMEN: Bowel sounds present; Soft, Nontender, Nondistended.   EXTREMITIES:  2+ Peripheral Pulses, brisk capillary refill. No clubbing, cyanosis, or edema.  PSYCH: Normal affect.  SKIN: No rashes or lesions.  -------------------------------------------------------------------------------------------    I&O's Summary  23 Feb 2023 07:01  -  24 Feb 2023 07:00  --------------------------------------------------------  IN: 480 mL / OUT: 1400 mL / NET: -920 mL      LABS:                      12.8   5.48  )-----------( 289      ( 24 Feb 2023 06:24 )             40.5     02-23  142  |  103  |  25<H>  ----------------------------<  93  3.9   |  25  |  0.85    Ca    9.6      23 Feb 2023 06:40  Phos  3.9     02-23  Mg     2.1     02-23    CARDIAC MARKERS ( 21 Feb 2023 08:16 )  73 ng/L / x     / x     / x     / x     / x      CARDIAC MARKERS ( 21 Feb 2023 01:33 )  77 ng/L / x     / x     / x     / x     / x      CARDIAC MARKERS ( 21 Feb 2023 00:23 )  37 ng/L / x     / x     / x     / x     / x        -------------------------------------------------------------------------------------------  Meds:  acetaminophen     Tablet .. 650 milliGRAM(s) Oral every 6 hours PRN  aMIOdarone    Tablet 200 milliGRAM(s) Oral daily  apixaban 2.5 milliGRAM(s) Oral every 12 hours  chlorhexidine 2% Cloths 1 Application(s) Topical <User Schedule>  famotidine    Tablet 20 milliGRAM(s) Oral daily  furosemide   Injectable 40 milliGRAM(s) IV Push two times a day  levothyroxine 112 MICROGram(s) Oral daily  losartan 25 milliGRAM(s) Oral daily  melatonin 3 milliGRAM(s) Oral at bedtime PRN  ondansetron Injectable 4 milliGRAM(s) IV Push every 8 hours PRN    -------------------------------------------------------------------------------------------    TTE  Conclusions:  1. Mitral annular calcification. Tethered mitral valve leaflets with normal opening. Moderate mitral regurgitation.  2. Normal trileaflet aortic valve. Minimal aortic regurgitation.  3. Mildly dilated left atrium.  LA volume index = 39 cc/m2. Normal left ventricular internal dimensions and wall thicknesses. Moderate global left ventricular systolic dysfunction. LVEF calculated using biplane Little's method is 35%. Septal motion consistent with conduction defect.  Unable to determine diastolic function due to merged E and A wave.  4. Normal right atrium. Normal right ventricular size and function.  5. Normal tricuspid valve. Minimal tricuspid regurgitation. Unable to estimate RVSP.  6. Small loculated pericardial effusion mostly around the right atrium.

## 2023-02-24 NOTE — PATIENT PROFILE ADULT - FALL HARM RISK - HARM RISK INTERVENTIONS

## 2023-02-24 NOTE — PATIENT PROFILE ADULT - FUNCTIONAL ASSESSMENT - BASIC MOBILITY 6.
3-calculated by average/Not able to assess (calculate score using [facility] averaging method)

## 2023-02-24 NOTE — PROGRESS NOTE ADULT - SUBJECTIVE AND OBJECTIVE BOX
[facility] Division of Hospital Medicine  Dennise Lobo MD  Pager (M-F, 8A-5P): [phone]  Other Times:  [phone]    Patient is a 87y old  Female who presents with a chief complaint of SOB x 1 day (24 Feb 2023 07:01)      SUBJECTIVE / OVERNIGHT EVENTS:  no acute events overnight  reports feeling well walking around    ADDITIONAL REVIEW OF SYSTEMS:    MEDICATIONS  (STANDING):  aMIOdarone    Tablet 200 milliGRAM(s) Oral daily  apixaban 2.5 milliGRAM(s) Oral every 12 hours  carvedilol 3.125 milliGRAM(s) Oral every 12 hours  chlorhexidine 2% Cloths 1 Application(s) Topical <User Schedule>  famotidine    Tablet 20 milliGRAM(s) Oral daily  levothyroxine 112 MICROGram(s) Oral daily  losartan 25 milliGRAM(s) Oral daily    MEDICATIONS  (PRN):  acetaminophen     Tablet .. 650 milliGRAM(s) Oral every 6 hours PRN Temp greater or equal to 38C (100.4F), Mild Pain (1 - 3)  melatonin 3 milliGRAM(s) Oral at bedtime PRN Insomnia  ondansetron Injectable 4 milliGRAM(s) IV Push every 8 hours PRN Nausea and/or Vomiting      CAPILLARY BLOOD GLUCOSE        I&O's Summary    23 Feb 2023 07:01  -  24 Feb 2023 07:00  --------------------------------------------------------  IN: 480 mL / OUT: 1400 mL / NET: -920 mL        PHYSICAL EXAM:  Vital Signs Last 24 Hrs  T(C): 36.2 (24 Feb 2023 10:52), Max: 36.6 (23 Feb 2023 20:56)  T(F): 97.2 (24 Feb 2023 10:52), Max: 97.9 (23 Feb 2023 20:56)  HR: 53 (24 Feb 2023 10:52) (53 - 76)  BP: 94/66 (24 Feb 2023 10:52) (94/66 - 113/64)  BP(mean): --  RR: 18 (24 Feb 2023 10:52) (18 - 18)  SpO2: 96% (24 Feb 2023 10:52) (95% - 100%)    Parameters below as of 24 Feb 2023 10:52  Patient On (Oxygen Delivery Method): room air    GENERAL: No acute distress, elderly frail appearing   HEAD:  Atraumatic, Normocephalic  ENT: EOMI, PERRLA, conjunctiva and sclera clear,  moist mucosa no pharyngeal erythema or exudates   NECK: supple , no JVD   CHEST/LUNG: Clear to auscultation bilaterally; NO crackles today ,  No wheeze, equal breath sounds bilaterally   BACK: No spinal tenderness,  No CVA tenderness   HEART: Regular rate and rhythm; No murmurs, rubs, or gallops  ABDOMEN: Soft, Nontender, Nondistended; Bowel sounds present  EXTREMITIES:  No clubbing, cyanosis,no  edema b/l   MSK: No joint swelling or effusions, ROM intact   PSYCH: Normal behavior/affect  NEUROLOGY: AAOx3, non-focal, cranial nerves intact  SKIN: Normal color, No rashes or lesions    LABS:                        12.8   5.48  )-----------( 289      ( 24 Feb 2023 06:24 )             40.5     02-24    140  |  99  |  32<H>  ----------------------------<  99  3.3<L>   |  28  |  1.12    Ca    9.5      24 Feb 2023 06:24  Phos  4.8     02-24  Mg     2.1     02-24                  RADIOLOGY & ADDITIONAL TESTS:  Results Reviewed:   Imaging Personally Reviewed:  Electrocardiogram Personally Reviewed:    COORDINATION OF CARE:  Care Discussed with Consultants/Other Providers [Y/N]: Dr Coelho-can change to oral lasix  Prior or Outpatient Records Reviewed [Y/N]:

## 2023-02-24 NOTE — PROGRESS NOTE ADULT - ASSESSMENT
87 y.o F with hx of HFrEF (35%), a.fib on eliquis, HTN who [id number] with dyspnea on exertion secondary to volume overload fro ADHF.     #Acute decompensated heart failure HFrEF (EF 35%   - Last known dry weight Unknown  - Diurese IV lasix 40mg BID  - Beta-blocker: Will introduce beta-blocker once euvolemic  - ACE/ARB/ARNi: Losartan 25mg daily)  - Strict I/Os, daily standing weights (unable to use recorded weights as they are a mix of bed and standing weights, however patient still has persistently elevated JVD  - Fluid restriction 1.5L /day    #A.fib  - Eliquis 2.5mg BID (weight <60kg and age >80)  -  home amiodarone     87 y.o F with hx of HFrEF (35%), a.fib on Eliquis, & HTN.  Presented with dyspnea on exertion secondary to volume overload from ADHF.       #Acute decompensated heart failure HFrEF (EF 35%)   - Last known dry weight Unknown  - Diurese IV Lasix 40mg BID  - Beta-blocker: Will introduce beta-blocker once euvolemic  - ACE/ARB/ARNi: Losartan 25mg daily)  - Strict I/Os, daily standing weights (unable to use recorded weights as they are a mix of bed and standing weights, however patient still has persistently elevated JVD  - Fluid restriction 1.5L /day    #A.fib  - Eliquis 2.5mg BID (weight <60kg and age >80)  - C/W home amiodarone    #Moderate MR  - continue losartan for afterload reduction; continue diuresis.    #HTN  - BP in normal range on current meds.        Mario Clay MD  Cardiology fellow    Plan discussed with cardiology fellow.  Patient seen and examined.  Hx., exam and labs as above.  I agree with the assessment and recommendations, which I have reviewed and edited where appropriate.  Kishore Coelho M.D.  Cardiology Attending, Consult Service    For Cardiology consults and questions, all Cardiology service information can be found 24/7 on [email or web address] - use password: [email or web address] to log in.

## 2023-02-24 NOTE — PROGRESS NOTE ADULT - ASSESSMENT
87yoF PMH hypothyroidism, HTN,  newly diagnosed heart failure and afib with COVID in January 2023 (hospitalized at [facility]) presents 2/21 with SOB x 1 day c/w acute on chronic decompensated heart failure exacerbation with acute respiratory failure requiring bipap now on RA after diuresis, echo showing new systolic dysfunction, plan for outpatient upgrade to bivi pacer.

## 2023-02-24 NOTE — PROGRESS NOTE ADULT - NUTRITIONAL ASSESSMENT
< from: Transthoracic Echocardiogram (02.23.23 @ 10:08) >    1. Mitral annular calcification. Tethered mitral valve  leaflets with normal opening. Moderate mitral  regurgitation.  2. Normal trileaflet aortic valve. Minimal aortic  regurgitation.  3. Mildly dilated left atrium.  LA volume index = 39 cc/m2.  Normal left ventricular internal dimensions and wall  thicknesses.Moderate global left ventricular systolic  dysfunction. LVEF calculated using biplane Little's method  is 35%. Septal motion consistent with conduction defect.  Unable to determine diastolic function due to merged E and  A wave.  4. Normal right atrium. Normal right ventricular size and  function.  5. Normal tricuspid valve. Minimal tricuspid  regurgitation.Unable to estimate RVSP.  10. Small loculated pericardial effusion mostly around the  right atrium.    < end of copied text >

## 2023-02-24 NOTE — PATIENT PROFILE ADULT - NSPROSPHOSPCHAPLAINYN_GEN_A_NUR
Received a fax from [employer] it is a Medication Administration Documentation and Communication form     Prescriber Name : MARCELLUS Michele    Prescriber fax : [phone]    Patient Name : Asmita Kevin     : 1992    Medication and dose Administered : INVEGA SUSTENNA    Date of Administration : 2020    Route :1M    Injection Site :RT DELTOID     Lot# [id number]    Expiration :     Notes: wearing a mask temp 98.8 covid screening negative  No complaints last injection.  Used 23G 1\" needle   No complaints todays injection.   Admin By: Raúl Martinez [facility]        no

## 2023-02-25 ENCOUNTER — TRANSCRIPTION ENCOUNTER (OUTPATIENT)
Age: 88
End: 2023-02-25

## 2023-02-25 VITALS
SYSTOLIC BLOOD PRESSURE: 120 MMHG | DIASTOLIC BLOOD PRESSURE: 69 MMHG | HEART RATE: 94 BPM | RESPIRATION RATE: 18 BRPM | OXYGEN SATURATION: 90 % | TEMPERATURE: 98 F

## 2023-02-25 LAB
ANION GAP SERPL CALC-SCNC: 13 MMOL/L — SIGNIFICANT CHANGE UP (ref 5–17)
BUN SERPL-MCNC: 44 MG/DL — HIGH (ref 7–23)
CALCIUM SERPL-MCNC: 9.4 MG/DL — SIGNIFICANT CHANGE UP (ref 8.4–10.5)
CHLORIDE SERPL-SCNC: 101 MMOL/L — SIGNIFICANT CHANGE UP (ref 96–108)
CO2 SERPL-SCNC: 27 MMOL/L — SIGNIFICANT CHANGE UP (ref 22–31)
CREAT SERPL-MCNC: 1.29 MG/DL — SIGNIFICANT CHANGE UP (ref 0.5–1.3)
EGFR: 40 ML/MIN/1.73M2 — LOW
GLUCOSE SERPL-MCNC: 96 MG/DL — SIGNIFICANT CHANGE UP (ref 70–99)
MAGNESIUM SERPL-MCNC: 2.3 MG/DL — SIGNIFICANT CHANGE UP (ref 1.6–2.6)
PHOSPHATE SERPL-MCNC: 4.7 MG/DL — HIGH (ref 2.5–4.5)
POTASSIUM SERPL-MCNC: 3.9 MMOL/L — SIGNIFICANT CHANGE UP (ref 3.5–5.3)
POTASSIUM SERPL-SCNC: 3.9 MMOL/L — SIGNIFICANT CHANGE UP (ref 3.5–5.3)
SODIUM SERPL-SCNC: 141 MMOL/L — SIGNIFICANT CHANGE UP (ref 135–145)

## 2023-02-25 PROCEDURE — 80053 COMPREHEN METABOLIC PANEL: CPT

## 2023-02-25 PROCEDURE — 94660 CPAP INITIATION&MGMT: CPT

## 2023-02-25 PROCEDURE — 83605 ASSAY OF LACTIC ACID: CPT

## 2023-02-25 PROCEDURE — 84295 ASSAY OF SERUM SODIUM: CPT

## 2023-02-25 PROCEDURE — 82947 ASSAY GLUCOSE BLOOD QUANT: CPT

## 2023-02-25 PROCEDURE — 84100 ASSAY OF PHOSPHORUS: CPT

## 2023-02-25 PROCEDURE — 84443 ASSAY THYROID STIM HORMONE: CPT

## 2023-02-25 PROCEDURE — 36415 COLL VENOUS BLD VENIPUNCTURE: CPT

## 2023-02-25 PROCEDURE — 82435 ASSAY OF BLOOD CHLORIDE: CPT

## 2023-02-25 PROCEDURE — 87641 MR-STAPH DNA AMP PROBE: CPT

## 2023-02-25 PROCEDURE — 71045 X-RAY EXAM CHEST 1 VIEW: CPT

## 2023-02-25 PROCEDURE — 80048 BASIC METABOLIC PNL TOTAL CA: CPT

## 2023-02-25 PROCEDURE — 87637 SARSCOV2&INF A&B&RSV AMP PRB: CPT

## 2023-02-25 PROCEDURE — 85027 COMPLETE CBC AUTOMATED: CPT

## 2023-02-25 PROCEDURE — 97161 PT EVAL LOW COMPLEX 20 MIN: CPT

## 2023-02-25 PROCEDURE — 85014 HEMATOCRIT: CPT

## 2023-02-25 PROCEDURE — 82330 ASSAY OF CALCIUM: CPT

## 2023-02-25 PROCEDURE — 83735 ASSAY OF MAGNESIUM: CPT

## 2023-02-25 PROCEDURE — 84132 ASSAY OF SERUM POTASSIUM: CPT

## 2023-02-25 PROCEDURE — 87640 STAPH A DNA AMP PROBE: CPT

## 2023-02-25 PROCEDURE — 85025 COMPLETE CBC W/AUTO DIFF WBC: CPT

## 2023-02-25 PROCEDURE — 81003 URINALYSIS AUTO W/O SCOPE: CPT

## 2023-02-25 PROCEDURE — 85018 HEMOGLOBIN: CPT

## 2023-02-25 PROCEDURE — 82803 BLOOD GASES ANY COMBINATION: CPT

## 2023-02-25 PROCEDURE — 97110 THERAPEUTIC EXERCISES: CPT

## 2023-02-25 PROCEDURE — 85730 THROMBOPLASTIN TIME PARTIAL: CPT

## 2023-02-25 PROCEDURE — 99239 HOSP IP/OBS DSCHRG MGMT >30: CPT

## 2023-02-25 PROCEDURE — 93306 TTE W/DOPPLER COMPLETE: CPT

## 2023-02-25 PROCEDURE — 97116 GAIT TRAINING THERAPY: CPT

## 2023-02-25 PROCEDURE — 85610 PROTHROMBIN TIME: CPT

## 2023-02-25 PROCEDURE — 84484 ASSAY OF TROPONIN QUANT: CPT

## 2023-02-25 PROCEDURE — 99285 EMERGENCY DEPT VISIT HI MDM: CPT

## 2023-02-25 PROCEDURE — 83880 ASSAY OF NATRIURETIC PEPTIDE: CPT

## 2023-02-25 PROCEDURE — 80076 HEPATIC FUNCTION PANEL: CPT

## 2023-02-25 RX ORDER — FUROSEMIDE 40 MG
2 TABLET ORAL
Qty: 60 | Refills: 0
Start: 2023-02-25 | End: 2023-03-26

## 2023-02-25 RX ORDER — FUROSEMIDE 40 MG
1 TABLET ORAL
Qty: 0 | Refills: 0 | DISCHARGE

## 2023-02-25 RX ORDER — CARVEDILOL PHOSPHATE 80 MG/1
1 CAPSULE, EXTENDED RELEASE ORAL
Qty: 30 | Refills: 0
Start: 2023-02-25 | End: 2023-03-26

## 2023-02-25 RX ORDER — CARVEDILOL PHOSPHATE 80 MG/1
1 CAPSULE, EXTENDED RELEASE ORAL
Qty: 60 | Refills: 0
Start: 2023-02-25 | End: 2023-03-26

## 2023-02-25 RX ORDER — FUROSEMIDE 40 MG
2 TABLET ORAL
Qty: 120 | Refills: 0
Start: 2023-02-25 | End: 2023-03-26

## 2023-02-25 RX ADMIN — CHLORHEXIDINE GLUCONATE 1 APPLICATION(S): 213 SOLUTION TOPICAL at 05:22

## 2023-02-25 RX ADMIN — LOSARTAN POTASSIUM 25 MILLIGRAM(S): 100 TABLET, FILM COATED ORAL at 05:21

## 2023-02-25 RX ADMIN — Medication 40 MILLIGRAM(S): at 05:22

## 2023-02-25 RX ADMIN — Medication 112 MICROGRAM(S): at 05:21

## 2023-02-25 RX ADMIN — CARVEDILOL PHOSPHATE 3.12 MILLIGRAM(S): 80 CAPSULE, EXTENDED RELEASE ORAL at 05:22

## 2023-02-25 RX ADMIN — AMIODARONE HYDROCHLORIDE 200 MILLIGRAM(S): 400 TABLET ORAL at 05:21

## 2023-02-25 RX ADMIN — FAMOTIDINE 20 MILLIGRAM(S): 10 INJECTION INTRAVENOUS at 13:08

## 2023-02-25 RX ADMIN — APIXABAN 2.5 MILLIGRAM(S): 2.5 TABLET, FILM COATED ORAL at 05:21

## 2023-02-25 NOTE — PROGRESS NOTE ADULT - NSPROGADDITIONALINFOA_GEN_ALL_CORE
reviewed with LUCIE Reynoso-will try to obtain [facility] records
PT recommends home PT  I left message for PCP Prince Fink    reviewed plan with LUCIE Polanco
reviewed with LUCIE Edwards
anticipate d/c tomorrow AM
discharge time: 40min-reviewed plan with outpatient PCP Jase and with ACP [place] and patient.  Spoke with Son Rubio as well.

## 2023-02-25 NOTE — PROGRESS NOTE ADULT - SUBJECTIVE AND OBJECTIVE BOX
[facility] Division of Hospital Medicine  Dennise Lobo MD  Pager (M-F, 8A-5P): [phone]  Other Times:  [phone]    Patient is a 87y old  Female who presents with a chief complaint of SOB x 1 day (25 Feb 2023 11:46)      SUBJECTIVE / OVERNIGHT EVENTS:  no acute events overnight  received first dose of coreg this AM.  Denies lightheadedness.  Did not receive last night due to SBP<100.  no chest pain    ADDITIONAL REVIEW OF SYSTEMS:    MEDICATIONS  (STANDING):  aMIOdarone    Tablet 200 milliGRAM(s) Oral daily  apixaban 2.5 milliGRAM(s) Oral every 12 hours  carvedilol 3.125 milliGRAM(s) Oral every 12 hours  chlorhexidine 2% Cloths 1 Application(s) Topical <User Schedule>  famotidine    Tablet 20 milliGRAM(s) Oral daily  influenza  Vaccine (HIGH DOSE) 0.7 milliLiter(s) IntraMuscular once  levothyroxine 112 MICROGram(s) Oral daily  losartan 25 milliGRAM(s) Oral daily  potassium chloride    Tablet ER 40 milliEquivalent(s) Oral once    MEDICATIONS  (PRN):  acetaminophen     Tablet .. 650 milliGRAM(s) Oral every 6 hours PRN Temp greater or equal to 38C (100.4F), Mild Pain (1 - 3)  melatonin 3 milliGRAM(s) Oral at bedtime PRN Insomnia  ondansetron Injectable 4 milliGRAM(s) IV Push every 8 hours PRN Nausea and/or Vomiting      CAPILLARY BLOOD GLUCOSE        I&O's Summary    24 Feb 2023 07:01  -  25 Feb 2023 07:00  --------------------------------------------------------  IN: 200 mL / OUT: 1150 mL / NET: -950 mL        PHYSICAL EXAM:  Vital Signs Last 24 Hrs  T(C): 36.6 (25 Feb 2023 11:49), Max: 36.6 (24 Feb 2023 21:21)  T(F): 97.9 (25 Feb 2023 11:49), Max: 97.9 (25 Feb 2023 11:49)  HR: 60 (25 Feb 2023 11:49) (55 - 66)  BP: 90/50 (25 Feb 2023 11:49) (90/50 - 111/51)  BP(mean): --  RR: 18 (25 Feb 2023 11:49) (18 - 18)  SpO2: 93% (25 Feb 2023 11:49) (93% - 96%)    Parameters below as of 25 Feb 2023 11:49  Patient On (Oxygen Delivery Method): room air      GENERAL: No acute distress, elderly frail appearing   HEAD:  Atraumatic, Normocephalic  ENT: EOMI, PERRLA, conjunctiva and sclera clear,  moist mucosa no pharyngeal erythema or exudates   NECK: supple , no JVD   CHEST/LUNG: Clear to auscultation bilaterally; NO crackles today ,  No wheeze, equal breath sounds bilaterally   BACK: No spinal tenderness,  No CVA tenderness   HEART: Regular rate and rhythm; No murmurs, rubs, or gallops  ABDOMEN: Soft, Nontender, Nondistended; Bowel sounds present  EXTREMITIES:  No clubbing, cyanosis,no  edema b/l   MSK: No joint swelling or effusions, ROM intact   PSYCH: Normal behavior/affect  NEUROLOGY: AAOx3, non-focal, cranial nerves intact  SKIN: Normal color, No rashes or lesions    LABS:                        12.8   5.48  )-----------( 289      ( 24 Feb 2023 06:24 )             40.5     02-25    141  |  101  |  44<H>  ----------------------------<  96  3.9   |  27  |  1.29    Ca    9.4      25 Feb 2023 06:36  Phos  4.7     02-25  Mg     2.3     02-25                  RADIOLOGY & ADDITIONAL TESTS:  Results Reviewed:   Imaging Personally Reviewed:  Electrocardiogram Personally Reviewed:    COORDINATION OF CARE:  Care Discussed with Consultants/Other Providers [Y/N]:  Prior or Outpatient Records Reviewed [Y/N]:

## 2023-02-25 NOTE — PROGRESS NOTE ADULT - PROBLEM SELECTOR PLAN 3
- levothyroxine   - tsh elevated at 7.39.  Will need outpatient f/u
- levothyroxine   - tsh

## 2023-02-25 NOTE — DISCHARGE NOTE NURSING/CASE MANAGEMENT/SOCIAL WORK - NSDCPEFALRISK_GEN_ALL_CORE
For information on Fall & Injury Prevention, visit: [email or web address] OR  [email or web address] OR  https://www.cdc.gov/steadi/patient.html

## 2023-02-25 NOTE — DISCHARGE NOTE PROVIDER - NSDCCPCAREPLAN_GEN_ALL_CORE_FT
PRINCIPAL DISCHARGE DIAGNOSIS  Diagnosis: Acute exacerbation of CHF (congestive heart failure)  Assessment and Plan of Treatment: You were diagnosed with acute exacerbation of your heart failure. You were treated with an IV water pill in the hospital and now breathing comfortably on room air. Please follow up with your cardiology outpatient and please take your medications as indicated. Please come back to the hospital if you are experiencing chest pain or worsening shortness of breath or sensation of passing out.      SECONDARY DISCHARGE DIAGNOSES  Diagnosis: Paroxysmal atrial fibrillation  Assessment and Plan of Treatment: You have a history of pAfib, controlled on amiodarone and Eliquis from your home doses. Please follow up with your cardiology outpatient and please take your medications as indicated. Please come back to the hospital if you experience chest pain with palpitations     PRINCIPAL DISCHARGE DIAGNOSIS  Diagnosis: Acute exacerbation of CHF (congestive heart failure)  Assessment and Plan of Treatment: ALTERNATE YOUR LASIX DOSES WITH 20MG AND 40MG, STARTING WITH 20MG ON SUNDAY 2/26, 40MG MONDAY 2/27, 20MG TUESDAY 2/28 ETC  TAKE CARVEDILOL AT BEDTIME STARTING SUNDAY 2/26  FOLLOW UP WITH DR ALEXIS ON TUESDAY 2/28 AND HE WILL DISCUSS UPGRADING PACER  You were diagnosed with acute exacerbation of your heart failure. You were treated with an IV water pill in the hospital and now breathing comfortably on room air. Please follow up with your cardiology outpatient and please take your medications as indicated. Please come back to the hospital if you are experiencing chest pain or worsening shortness of breath or sensation of passing out.      SECONDARY DISCHARGE DIAGNOSES  Diagnosis: Paroxysmal atrial fibrillation  Assessment and Plan of Treatment: You have a history of pAfib, controlled on amiodarone and Eliquis from your home doses. Please follow up with your cardiology outpatient and please take your medications as indicated. Please come back to the hospital if you experience chest pain with palpitations    Diagnosis: Hypothyroid  Assessment and Plan of Treatment: FOLLOW UP WITH DR ALEXIS ON SYNTHROID DOSING WITH ELEVATED TSH

## 2023-02-25 NOTE — DISCHARGE NOTE PROVIDER - PROVIDER TOKENS
PROVIDER:[TOKEN:[[id number]],FOLLOWUP:[1 week],ESTABLISHEDPATIENT:[T]],PROVIDER:[TOKEN:[[id number]],FOLLOWUP:[2 weeks],ESTABLISHEDPATIENT:[T]],PROVIDER:[TOKEN:[[id number]],FOLLOWUP:[2 weeks],ESTABLISHEDPATIENT:[T]]

## 2023-02-25 NOTE — PROGRESS NOTE ADULT - ASSESSMENT
87yoF PMH hypothyroidism, HTN,  newly diagnosed heart failure and afib with COVID in January 2023 (hospitalized at [facility]) presents 2/21 with SOB x 1 day c/w acute on chronic decompensated heart failure exacerbation with acute respiratory failure requiring bipap now on RA after diuresis, echo showing new systolic dysfunction, plan for outpatient upgrade to bivi pacer for d/c home today.

## 2023-02-25 NOTE — DISCHARGE NOTE PROVIDER - CARE PROVIDER_API CALL
Prince Fink  CARDIOLOGY  [address] [place] [address]  Phone: [phone]  Fax: [phone]  Established Patient  Follow Up Time: 1 week    Florentin Adkins  GASTROENTEROLOGY  [address] [place] [address], [address]  [place], [place] [id number]  Phone: [phone]  Fax: [phone]  Established Patient  Follow Up Time: 2 weeks    Umair Bush)  Internal Medicine  [address] [place] [address]  Phone: [phone]  Fax: [phone]  Established Patient  Follow Up Time: 2 weeks

## 2023-02-25 NOTE — DISCHARGE NOTE PROVIDER - HOSPITAL COURSE
88yo female with a PMH of hypothyroidism, HTN, recent hospitalization ( [facility])in January ’23 for covid and newly diagnosed heart failure c/b newly diagnosed  afib on amiodarone and eliquis; she now presents for worsened shortness of breath c/f acute CHF exacerbation initially requiring Bipap therapy in ED, but now after medical intervention has successfully weaned down to room air. Her antihypertensive medications were titrated and coreg was added, and home lasix dose was doubled. Pt will follow up with outpatient cardiology for further diuretic and blood pressure medication management. Pt medically stable and family ready for discharge. 87yoF PMH hypothyroidism, HTN,  newly diagnosed heart failure and afib with COVID in January 2023 (hospitalized at [facility]) presents 2/21 with SOB x 1 day c/w acute on chronic decompensated heart failure exacerbation with acute respiratory failure requiring bipap now on RA after diuresis, echo showing new systolic dysfunction, plan for outpatient upgrade to bivi pacer for d/c home today.    TTE: 1. Mitral annular calcification. Tethered mitral valve leaflets with normal opening. Moderate mitral regurgitation.  2. Normal trileaflet aortic valve. Minimal aortic regurgitation. 3. Mildly dilated left atrium.  LA volume index = 39 cc/m2. Normal left ventricular internal dimensions and wall thicknesses.Moderate global left ventricular systolic  dysfunction. LVEF calculated using biplane Little's method is 35%. Septal motion consistent with conduction defect.  Normal right atrium. Normal right ventricular size and function. 5. Normal tricuspid valve. Minimal tricuspid  regurgitation.Unable to estimate RVSP.    Per d/w Dr Fink, plan for outpatient pacer upgrade.  I spoke with him today-aware of low BP after AM coreg-had similar occurrences at [facility].  Plan to d/c home on coreg at night only and lasix alternating 20mg/40mg.  She has appt with him Tuesday.

## 2023-02-25 NOTE — PROGRESS NOTE ADULT - PROBLEM SELECTOR PLAN 1
elevated BNP , symptoms c/w PND/ orthopnea , CXR w/ findings c/w pulm edema. Patient improved with lasix and bipap , now on NC . Patient was evaluated by Cardiology.  has mild leukocytosis , but afebrile , RVP neg , low suspicion for infectious etiology , and VTE less likely as patient on anticoagulation .  Lactate downtrending with diuresis as well.  Weights not accurate as not consistently doing standing weight.  Now on RA and no crackles on exam.  TTE: 1. Mitral annular calcification. Tethered mitral valve leaflets with normal opening. Moderate mitral regurgitation.  2. Normal trileaflet aortic valve. Minimal aortic regurgitation. 3. Mildly dilated left atrium.  LA volume index = 39 cc/m2. Normal left ventricular internal dimensions and wall thicknesses.Moderate global left ventricular systolic  dysfunction. LVEF calculated using biplane Little's method is 35%. Septal motion consistent with conduction defect.  Normal right atrium. Normal right ventricular size and function. 5. Normal tricuspid valve. Minimal tricuspid  regurgitation.Unable to estimate RVSP.  -I spoke with Dr Coelho, as creatinine/bun now uptrending and on RA plan to change to po lasix  -already on ARB  -will trial BB  - monitor on telemetry   - strict ins and outs  - daily weight (weight up by report however yesterday was bed weight and today standing)  -per d/w Dr Fink, plan for outpatient pacer upgrade.  I spoke with him today-aware of low BP after AM coreg-had similar occurrences at [facility].  Plan to d/c home on coreg at night only and lasix alternating 20mg/40mg.  She has appt with him Tuesday.
elevated BNP , symptoms c/w PND/ orthopnea , CXR w/ findings c/w pulm edema. Patient improved with lasix and bipap , now on NC . Patient was evaluated by Cardiology.  has mild leukocytosis , but afebrile , RVP neg , low suspicion for infectious etiology , and VTE less likely as patient on anticoagulation .  Lactate downtrending with diuresis as well  - appreciate cards c/s: increase lasix to 40iv bid  - monitor  mag and K, replete as needed  - monitor on telemetry   - strict ins and outs  - daily weight (weight up by report however yesterday was bed weight and today standing)  - echocardiogram results pending  -I spoke with Dr Fink multiple times yesterday and today.  CONFIRMED current cardiac medications are same as outpatient (with exception of lasix).  Will review echo report with him once resulted. He is concerned about MR.
elevated BNP , symptoms c/w PND/ orthopnea , CXR w/ findings c/w pulm edema. Patient improved with lasix and bipap , now on NC . Patient was evaluated by Cardiology.  has mild leukocytosis , but afebrile , RVP neg , low suspicion for infectious etiology , and VTE less likely as patient on anticoagulation .  Lactate downtrending with diuresis as well.  Weights not accurate as not consistently doing standing weight.  Now on RA and no crackles on exam.  TTE: 1. Mitral annular calcification. Tethered mitral valve leaflets with normal opening. Moderate mitral regurgitation.  2. Normal trileaflet aortic valve. Minimal aortic regurgitation. 3. Mildly dilated left atrium.  LA volume index = 39 cc/m2. Normal left ventricular internal dimensions and wall thicknesses.Moderate global left ventricular systolic  dysfunction. LVEF calculated using biplane Little's method is 35%. Septal motion consistent with conduction defect.  Normal right atrium. Normal right ventricular size and function. 5. Normal tricuspid valve. Minimal tricuspid  regurgitation.Unable to estimate RVSP.  -I spoke with Dr Coelho, as creatinine/bun now uptrending and on RA plan to change to po lasix  -already on ARB  -will trial BB  - monitor on telemetry   - strict ins and outs  - daily weight (weight up by report however yesterday was bed weight and today standing)  -per d/w Dr Fink, plan for outpatient pacer upgrade
elevated BNP , symptoms c/w PND/ orthopnea , CXR w/ findings c/w pulm edema. Patient improved with lasix and bipap , now on NC . Patient was evaluated by Cardiology.  has mild leukocytosis , but afebrile , RVP neg , low suspicion for infectious etiology , and VTE less likely as patient on anticoagulation .  Lactate downtrending with diuresis as well  - continue IV lasix 20 bid  - monitor  mag and K, replete as needed  - monitor on telemetry  - trend troponin   - strict ins and outs  - daily weight  - echocardiogram   - Cardiology consult appreciated  - obtain Records from [facility]
elevated BNP , symptoms c/w PND/ orthopnea , CXR w/ findings c/w pulm edema. Patient improved with lasix and bipap , now on NC . Patient was evaluated by Cardiology.  has mild leukocytosis , but afebrile , RVP neg , low suspicion for infectious etiology , and VTE less likely as patient on anticoagulation .  Lactate downtrending with diuresis as well  - continue IV lasix 20 bid  - monitor  mag and K, replete as needed  - monitor on telemetry  - trend troponin   - strict ins and outs  - daily weight  - echocardiogram   - Cardiology consult appreciated  - obtain Records from [facility]-I spoke with LUCIE Espino

## 2023-02-25 NOTE — DISCHARGE NOTE PROVIDER - CARE PROVIDERS DIRECT ADDRESSES
,DirectAddress_Unknown,DirectAddress_Unknown,[email or web address]

## 2023-02-25 NOTE — DISCHARGE NOTE NURSING/CASE MANAGEMENT/SOCIAL WORK - PATIENT PORTAL LINK FT
You can access the FollowMyHealth Patient Portal offered by [facility] by registering at the following website: [email or web address]. By joining [employer]’s FollowMyHealth portal, you will also be able to view your health information using other applications (apps) compatible with our system.

## 2023-02-25 NOTE — PROGRESS NOTE ADULT - PROBLEM SELECTOR PLAN 5
initially required bipap-weaned to NC  now on room air.
initially required bipap-weaned to NC  continue weaning as tolerated with diuresis
initially required bipap-weaned to NC  continue weaning as tolerated with diuresis
initially required bipap-weaned to NC  now on room air.
initially required bipap-weaned to NC  now on room air.

## 2023-02-25 NOTE — PROGRESS NOTE ADULT - PROBLEM SELECTOR PLAN 2
- continue amiodarone   - continue Eliquis

## 2023-02-25 NOTE — PROGRESS NOTE ADULT - PROBLEM SELECTOR PROBLEM 1
Acute HF (heart failure)

## 2023-02-27 ENCOUNTER — INPATIENT (INPATIENT)
Facility: HOSPITAL | Age: 88
LOS: 1 days | Discharge: HOME HEALTH | DRG: 641 | End: 2023-03-01
Attending: INTERNAL MEDICINE | Admitting: INTERNAL MEDICINE
Payer: MEDICARE

## 2023-02-27 VITALS
SYSTOLIC BLOOD PRESSURE: 108 MMHG | HEIGHT: 59 IN | TEMPERATURE: 98 F | WEIGHT: 89.07 LBS | RESPIRATION RATE: 20 BRPM | OXYGEN SATURATION: 95 % | HEART RATE: 65 BPM | DIASTOLIC BLOOD PRESSURE: 70 MMHG

## 2023-02-27 DIAGNOSIS — H25.019 CORTICAL AGE-RELATED CATARACT, UNSPECIFIED EYE: Chronic | ICD-10-CM

## 2023-02-27 DIAGNOSIS — R42 DIZZINESS AND GIDDINESS: ICD-10-CM

## 2023-02-27 LAB
ALBUMIN SERPL ELPH-MCNC: 3.8 G/DL — SIGNIFICANT CHANGE UP (ref 3.3–5)
ALP SERPL-CCNC: 58 U/L — SIGNIFICANT CHANGE UP (ref 40–120)
ALT FLD-CCNC: 15 U/L — SIGNIFICANT CHANGE UP (ref 10–45)
ANION GAP SERPL CALC-SCNC: 10 MMOL/L — SIGNIFICANT CHANGE UP (ref 5–17)
APPEARANCE UR: CLEAR — SIGNIFICANT CHANGE UP
AST SERPL-CCNC: 16 U/L — SIGNIFICANT CHANGE UP (ref 10–40)
BACTERIA # UR AUTO: NEGATIVE — SIGNIFICANT CHANGE UP
BASE EXCESS BLDV CALC-SCNC: 5.1 MMOL/L — HIGH (ref -2–3)
BASOPHILS # BLD AUTO: 0.04 K/UL — SIGNIFICANT CHANGE UP (ref 0–0.2)
BASOPHILS NFR BLD AUTO: 0.9 % — SIGNIFICANT CHANGE UP (ref 0–2)
BILIRUB SERPL-MCNC: 0.2 MG/DL — SIGNIFICANT CHANGE UP (ref 0.2–1.2)
BILIRUB UR-MCNC: NEGATIVE — SIGNIFICANT CHANGE UP
BUN SERPL-MCNC: 38 MG/DL — HIGH (ref 7–23)
CA-I SERPL-SCNC: 1.14 MMOL/L — LOW (ref 1.15–1.33)
CALCIUM SERPL-MCNC: 9 MG/DL — SIGNIFICANT CHANGE UP (ref 8.4–10.5)
CHLORIDE BLDV-SCNC: 102 MMOL/L — SIGNIFICANT CHANGE UP (ref 96–108)
CHLORIDE SERPL-SCNC: 103 MMOL/L — SIGNIFICANT CHANGE UP (ref 96–108)
CO2 BLDV-SCNC: 32 MMOL/L — HIGH (ref 22–26)
CO2 SERPL-SCNC: 28 MMOL/L — SIGNIFICANT CHANGE UP (ref 22–31)
COLOR SPEC: COLORLESS — SIGNIFICANT CHANGE UP
CREAT SERPL-MCNC: 1.04 MG/DL — SIGNIFICANT CHANGE UP (ref 0.5–1.3)
DIFF PNL FLD: NEGATIVE — SIGNIFICANT CHANGE UP
EGFR: 52 ML/MIN/1.73M2 — LOW
EOSINOPHIL # BLD AUTO: 0.2 K/UL — SIGNIFICANT CHANGE UP (ref 0–0.5)
EOSINOPHIL NFR BLD AUTO: 4.6 % — SIGNIFICANT CHANGE UP (ref 0–6)
EPI CELLS # UR: 0 /HPF — SIGNIFICANT CHANGE UP
FLUAV AG NPH QL: SIGNIFICANT CHANGE UP
FLUBV AG NPH QL: SIGNIFICANT CHANGE UP
GAS PNL BLDV: 138 MMOL/L — SIGNIFICANT CHANGE UP (ref 136–145)
GAS PNL BLDV: SIGNIFICANT CHANGE UP
GAS PNL BLDV: SIGNIFICANT CHANGE UP
GLUCOSE BLDV-MCNC: 131 MG/DL — HIGH (ref 70–99)
GLUCOSE SERPL-MCNC: 136 MG/DL — HIGH (ref 70–99)
GLUCOSE UR QL: NEGATIVE — SIGNIFICANT CHANGE UP
HCO3 BLDV-SCNC: 30 MMOL/L — HIGH (ref 22–29)
HCT VFR BLD CALC: 40.2 % — SIGNIFICANT CHANGE UP (ref 34.5–45)
HCT VFR BLDA CALC: 40 % — SIGNIFICANT CHANGE UP (ref 34.5–46.5)
HGB BLD CALC-MCNC: 13.2 G/DL — SIGNIFICANT CHANGE UP (ref 11.7–16.1)
HGB BLD-MCNC: 12.6 G/DL — SIGNIFICANT CHANGE UP (ref 11.5–15.5)
IMM GRANULOCYTES NFR BLD AUTO: 0.5 % — SIGNIFICANT CHANGE UP (ref 0–0.9)
KETONES UR-MCNC: NEGATIVE — SIGNIFICANT CHANGE UP
LACTATE BLDV-MCNC: 1.9 MMOL/L — SIGNIFICANT CHANGE UP (ref 0.5–2)
LEUKOCYTE ESTERASE UR-ACNC: NEGATIVE — SIGNIFICANT CHANGE UP
LYMPHOCYTES # BLD AUTO: 0.64 K/UL — LOW (ref 1–3.3)
LYMPHOCYTES # BLD AUTO: 14.7 % — SIGNIFICANT CHANGE UP (ref 13–44)
MAGNESIUM SERPL-MCNC: 2.6 MG/DL — SIGNIFICANT CHANGE UP (ref 1.6–2.6)
MCHC RBC-ENTMCNC: 29 PG — SIGNIFICANT CHANGE UP (ref 27–34)
MCHC RBC-ENTMCNC: 31.3 GM/DL — LOW (ref 32–36)
MCV RBC AUTO: 92.6 FL — SIGNIFICANT CHANGE UP (ref 80–100)
MONOCYTES # BLD AUTO: 0.58 K/UL — SIGNIFICANT CHANGE UP (ref 0–0.9)
MONOCYTES NFR BLD AUTO: 13.3 % — SIGNIFICANT CHANGE UP (ref 2–14)
NEUTROPHILS # BLD AUTO: 2.87 K/UL — SIGNIFICANT CHANGE UP (ref 1.8–7.4)
NEUTROPHILS NFR BLD AUTO: 66 % — SIGNIFICANT CHANGE UP (ref 43–77)
NITRITE UR-MCNC: NEGATIVE — SIGNIFICANT CHANGE UP
NRBC # BLD: 0 /100 WBCS — SIGNIFICANT CHANGE UP (ref 0–0)
NT-PROBNP SERPL-SCNC: 1185 PG/ML — HIGH (ref 0–300)
PCO2 BLDV: 47 MMHG — HIGH (ref 39–42)
PH BLDV: 7.42 — SIGNIFICANT CHANGE UP (ref 7.32–7.43)
PH UR: 7.5 — SIGNIFICANT CHANGE UP (ref 5–8)
PLATELET # BLD AUTO: 250 K/UL — SIGNIFICANT CHANGE UP (ref 150–400)
PO2 BLDV: 58 MMHG — HIGH (ref 25–45)
POTASSIUM BLDV-SCNC: 3.6 MMOL/L — SIGNIFICANT CHANGE UP (ref 3.5–5.1)
POTASSIUM SERPL-MCNC: 3.8 MMOL/L — SIGNIFICANT CHANGE UP (ref 3.5–5.3)
POTASSIUM SERPL-SCNC: 3.8 MMOL/L — SIGNIFICANT CHANGE UP (ref 3.5–5.3)
PROT SERPL-MCNC: 7 G/DL — SIGNIFICANT CHANGE UP (ref 6–8.3)
PROT UR-MCNC: NEGATIVE — SIGNIFICANT CHANGE UP
RBC # BLD: 4.34 M/UL — SIGNIFICANT CHANGE UP (ref 3.8–5.2)
RBC # FLD: 14.6 % — HIGH (ref 10.3–14.5)
RBC CASTS # UR COMP ASSIST: 0 /HPF — SIGNIFICANT CHANGE UP (ref 0–4)
RSV RNA NPH QL NAA+NON-PROBE: SIGNIFICANT CHANGE UP
SAO2 % BLDV: 90.7 % — HIGH (ref 67–88)
SARS-COV-2 RNA SPEC QL NAA+PROBE: SIGNIFICANT CHANGE UP
SODIUM SERPL-SCNC: 141 MMOL/L — SIGNIFICANT CHANGE UP (ref 135–145)
SP GR SPEC: 1.01 — LOW (ref 1.01–1.02)
TROPONIN T, HIGH SENSITIVITY RESULT: 34 NG/L — SIGNIFICANT CHANGE UP (ref 0–51)
TROPONIN T, HIGH SENSITIVITY RESULT: 39 NG/L — SIGNIFICANT CHANGE UP (ref 0–51)
UROBILINOGEN FLD QL: NEGATIVE — SIGNIFICANT CHANGE UP
WBC # BLD: 4.35 K/UL — SIGNIFICANT CHANGE UP (ref 3.8–10.5)
WBC # FLD AUTO: 4.35 K/UL — SIGNIFICANT CHANGE UP (ref 3.8–10.5)
WBC UR QL: 0 /HPF — SIGNIFICANT CHANGE UP (ref 0–5)

## 2023-02-27 PROCEDURE — 99285 EMERGENCY DEPT VISIT HI MDM: CPT

## 2023-02-27 PROCEDURE — 71045 X-RAY EXAM CHEST 1 VIEW: CPT | Mod: 26

## 2023-02-27 PROCEDURE — 99284 EMERGENCY DEPT VISIT MOD MDM: CPT

## 2023-02-27 RX ORDER — LEVOTHYROXINE SODIUM 125 MCG
112 TABLET ORAL DAILY
Refills: 0 | Status: DISCONTINUED | OUTPATIENT
Start: 2023-02-27 | End: 2023-02-28

## 2023-02-27 RX ORDER — FAMOTIDINE 10 MG/ML
20 INJECTION INTRAVENOUS DAILY
Refills: 0 | Status: DISCONTINUED | OUTPATIENT
Start: 2023-02-27 | End: 2023-03-01

## 2023-02-27 RX ORDER — APIXABAN 2.5 MG/1
2.5 TABLET, FILM COATED ORAL EVERY 12 HOURS
Refills: 0 | Status: DISCONTINUED | OUTPATIENT
Start: 2023-02-27 | End: 2023-03-01

## 2023-02-27 RX ORDER — LOSARTAN POTASSIUM 100 MG/1
25 TABLET, FILM COATED ORAL DAILY
Refills: 0 | Status: DISCONTINUED | OUTPATIENT
Start: 2023-02-27 | End: 2023-03-01

## 2023-02-27 RX ORDER — AMIODARONE HYDROCHLORIDE 400 MG/1
200 TABLET ORAL DAILY
Refills: 0 | Status: DISCONTINUED | OUTPATIENT
Start: 2023-02-27 | End: 2023-03-01

## 2023-02-27 RX ORDER — ACETAMINOPHEN 500 MG
650 TABLET ORAL EVERY 6 HOURS
Refills: 0 | Status: DISCONTINUED | OUTPATIENT
Start: 2023-02-27 | End: 2023-03-01

## 2023-02-27 NOTE — ED PROVIDER NOTE - CARE PLAN
1 Principal Discharge DX:	Postural dizziness with presyncope  Secondary Diagnosis:	CHF exacerbation

## 2023-02-27 NOTE — CONSULT NOTE ADULT - ASSESSMENT
87 y.o F with hx of newly diagnosed HFrEF (35%) 01/2023 at [facility].fib on eliquis, HTN, hypothyroid and recent admission at [facility] for ADHF, discharged on 2/25 who presents to the ED with a presyncopal episode.     Impression  Presyncopal episode likely in the setting of dehydration from overdiuresis in combination with bradycardia from new beta-blocker. Patient has previously not tolerated beta-blockers and was discharged on daily carvedilol for this reason.   - ECG: Sinus bradycardia with PVC  Trop: 34-> 39  BNP: 1185 previously 3170    Plan  - Hold lasix  - Hold coreg  - Please obtain orthostatic BP   - Losartan 25mg for now  - Continue eliquis 2.5mg Q12 (age > 80 and weight < 60)  - Resume home   - CDU for observation overnight, will likely discharge on PRN lasix based on weight gain    Recommendations are preliminary until attending attestation.    Mario Clay MD  Cardiology Fellow- PGY 4

## 2023-02-27 NOTE — ED PROVIDER NOTE - ATTENDING CONTRIBUTION TO CARE
RGUJRAL 87-year-old female history listed brought in by son today for shortness of breath and dizziness this morning.  Patient was recently hospitalized for decompensated heart failure. states she was doing well after discharge.  Patient lives by herself with her son assisting her.  Denies any cough fever chills dysuria.  Has been tolerating oral.  States this morning she felt short of breath and dizzy.  Which is improved at this time.  On exam patient is well-appearing no acute distress speaking full sentences.  Lungs with bibasilar Rales.  Abdomen is soft nontender.  Extremities no edema.  EKG reviewed. will obtain labs to evaluate for ACS heart failure infection.  Monitor and reeval

## 2023-02-27 NOTE — CONSULT NOTE ADULT - SUBJECTIVE AND OBJECTIVE BOX
CARDIOLOGY FELLOW CONSULT NOTE    HPI:      PMHx:   H/O: hypothyroidism  Hypercholesteremia  Thyroid ca  Lupus  Meniere disease  Anemia  Bundle branch block, left  Bleeding hemorrhoid  Left ovarian cyst    PSHx:   S/P thyroidectomy  History of appendectomy  History of abdominal hysterectomy  S/P breast biopsy  Cataract cortical, senile    Allergies:  aspirin (Hives)  Mushrooms. (Nausea)  penicillins (Hives)  shellfish (Hives)  sulfa drugs (Anaphylaxis)    FAMILY HISTORY:  Type 2 diabetes mellitus (Mother)    ROS:  CV: chest pain (-), palpitation (-), orthopnea (-), PND (-), edema (-)  PULM: SOB (-), cough (-), wheezing (-), hemoptysis (-).   CONST: fever (-), chills (-) or fatigue (-)  GI: abdominal distension (-), abdominal pain (-) , nausea/vomiting (-), hematemesis, (-), melena (-), hematochezia (-)  : dysuria (-), frequency (-), hematuria (-).   NEURO: numbness (-), weakness (-), dizziness (-)  SKIN: itching (-), rash (-)  HEENT:  visual changes (-); vertigo or throat pain (-);  neck stiffness (-)     Physical Exam:  T(F): 97.6 (02-27), Max: 97.8 (02-27)  HR: 64 (02-27) (63 - 65)  BP: 118/63 (02-27) (108/61 - 118/63)  RR: 13 (02-27)  SpO2: 96% (02-27)    GENERAL: NAD  HEAD:  Atraumatic, Normocephalic.  EYES: EOMI, PERRLA, conjunctiva and sclera clear.  NECK: Supple, No JVD.  CHEST/LUNG: Clear to auscultation bilaterally; No rales, rhonchi, wheezing, or rubs. Speaking in full sentences  HEART: Regular rate and rhythm; No murmurs, rubs, or gallops.  ABDOMEN: Bowel sounds present; Soft, Nontender, Nondistended.   EXTREMITIES:  2+ Peripheral Pulses, brisk capillary refill. No clubbing, cyanosis, or edema.  PSYCH: Normal affect.  SKIN: No rashes or lesions.    ECG: Personally reviewed    Echo: Personally reviewed    Stress Testing: Personally reviewed    Cath: Personally reviewed    CXR: Personally reviewed    Labs: Personally reviewed                        12.6   4.35  )-----------( 250      ( 27 Feb 2023 10:27 )             40.2     02-27    141  |  103  |  38<H>  ----------------------------<  136<H>  3.8   |  28  |  1.04    Ca    9.0      27 Feb 2023 10:27  Mg     2.6     02-27    TPro  7.0  /  Alb  3.8  /  TBili  0.2  /  DBili  x   /  AST  16  /  ALT  15  /  AlkPhos  58  02-27    CARDIAC MARKERS ( 27 Feb 2023 13:16 )  39 ng/L / x     / x     / x     / x     / x      CARDIAC MARKERS ( 27 Feb 2023 10:27 )  34 ng/L / x     / x     / x     / x     / x      CARDIAC MARKERS ( 21 Feb 2023 08:16 )  73 ng/L / x     / x     / x     / x     / x      CARDIAC MARKERS ( 21 Feb 2023 01:33 )  77 ng/L / x     / x     / x     / x     / x      CARDIAC MARKERS ( 21 Feb 2023 00:23 )  37 ng/L / x     / x     / x     / x     / x        Serum Pro-Brain Natriuretic Peptide: 1185 pg/mL (02-27 @ 10:27)  Serum Pro-Brain Natriuretic Peptide: 3170 pg/mL (02-21 @ 00:23)    Thyroid Stimulating Hormone, Serum: 7.39 uIU/mL (02-21 @ 06:19) CARDIOLOGY FELLOW CONSULT NOTE    HPI:    87 y.o F with hx of newly diagnosed HFrEF (35%) 01/2023 at [facility].fib on eliquis, HTN, hypothyroid and recent admission at [facility] for ADHF, discharged on 2/25 who presents to the ED with an episode of dizziness while at home.     PMHx:   H/O: hypothyroidism  Hypercholesteremia  Thyroid ca  Lupus  Meniere disease  Anemia  Bundle branch block, left  Bleeding hemorrhoid  Left ovarian cyst    PSHx:   S/P thyroidectomy  History of appendectomy  History of abdominal hysterectomy  S/P breast biopsy  Cataract cortical, senile    Allergies:  aspirin (Hives)  Mushrooms. (Nausea)  penicillins (Hives)  shellfish (Hives)  sulfa drugs (Anaphylaxis)    FAMILY HISTORY:  Type 2 diabetes mellitus (Mother)    ROS:  CV: chest pain (-), palpitation (-), orthopnea (-), PND (-), edema (-)  PULM: SOB (-), cough (-), wheezing (-), hemoptysis (-).   CONST: fever (-), chills (-) or fatigue (-)  GI: abdominal distension (-), abdominal pain (-) , nausea/vomiting (-), hematemesis, (-), melena (-), hematochezia (-)  : dysuria (-), frequency (-), hematuria (-).   NEURO: numbness (-), weakness (-), dizziness (-)  SKIN: itching (-), rash (-)  HEENT:  visual changes (-); vertigo or throat pain (-);  neck stiffness (-)     Physical Exam:  T(F): 97.6 (02-27), Max: 97.8 (02-27)  HR: 64 (02-27) (63 - 65)  BP: 118/63 (02-27) (108/61 - 118/63)  RR: 13 (02-27)  SpO2: 96% (02-27)    GENERAL: NAD  HEAD:  Atraumatic, Normocephalic.  EYES: EOMI, PERRLA, conjunctiva and sclera clear.  NECK: Supple, No JVD.  CHEST/LUNG: Clear to auscultation bilaterally; No rales, rhonchi, wheezing, or rubs. Speaking in full sentences  HEART: Regular rate and rhythm; No murmurs, rubs, or gallops.  ABDOMEN: Bowel sounds present; Soft, Nontender, Nondistended.   EXTREMITIES:  2+ Peripheral Pulses, brisk capillary refill. No clubbing, cyanosis, or edema.  PSYCH: Normal affect.  SKIN: No rashes or lesions.    ECG: Personally reviewed    Echo: Personally reviewed    Stress Testing: Personally reviewed    Cath: Personally reviewed    CXR: Personally reviewed    Labs: Personally reviewed                        12.6   4.35  )-----------( 250      ( 27 Feb 2023 10:27 )             40.2     02-27    141  |  103  |  38<H>  ----------------------------<  136<H>  3.8   |  28  |  1.04    Ca    9.0      27 Feb 2023 10:27  Mg     2.6     02-27    TPro  7.0  /  Alb  3.8  /  TBili  0.2  /  DBili  x   /  AST  16  /  ALT  15  /  AlkPhos  58  02-27    CARDIAC MARKERS ( 27 Feb 2023 13:16 )  39 ng/L / x     / x     / x     / x     / x      CARDIAC MARKERS ( 27 Feb 2023 10:27 )  34 ng/L / x     / x     / x     / x     / x      CARDIAC MARKERS ( 21 Feb 2023 08:16 )  73 ng/L / x     / x     / x     / x     / x      CARDIAC MARKERS ( 21 Feb 2023 01:33 )  77 ng/L / x     / x     / x     / x     / x      CARDIAC MARKERS ( 21 Feb 2023 00:23 )  37 ng/L / x     / x     / x     / x     / x        Serum Pro-Brain Natriuretic Peptide: 1185 pg/mL (02-27 @ 10:27)  Serum Pro-Brain Natriuretic Peptide: 3170 pg/mL (02-21 @ 00:23)    Thyroid Stimulating Hormone, Serum: 7.39 uIU/mL (02-21 @ 06:19) CARDIOLOGY FELLOW CONSULT NOTE    HPI:    87 y.o F with hx of newly diagnosed HFrEF (35%) 01/2023 at [facility].fib on eliquis, HTN, hypothyroid and recent admission at [facility] for ADHF, discharged on 2/25 who presents to the ED with an episode of dizziness while at home. Patient was sitting and got up before feeling lightleaded without any syncopal event. Of note patient was discharged on alternating diuresis of 20mg and 40mg lasix along with a new rx for coreg 3.125mg daily. She denies any chest pain or palpitations.     PMHx:   H/O: hypothyroidism  Hypercholesteremia  Thyroid ca  Lupus  Meniere disease  Anemia  Bundle branch block, left  Bleeding hemorrhoid  Left ovarian cyst    PSHx:   S/P thyroidectomy  History of appendectomy  History of abdominal hysterectomy  S/P breast biopsy  Cataract cortical, senile    Allergies:  aspirin (Hives)  Mushrooms. (Nausea)  penicillins (Hives)  shellfish (Hives)  sulfa drugs (Anaphylaxis)    FAMILY HISTORY:  Type 2 diabetes mellitus (Mother)    ROS:  CV: chest pain (-), palpitation (-), orthopnea (-), PND (-), edema (-)  PULM: SOB (-), cough (-), wheezing (-), hemoptysis (-).   CONST: fever (-), chills (-) or fatigue (-)  GI: abdominal distension (-), abdominal pain (-) , nausea/vomiting (-), hematemesis, (-), melena (-), hematochezia (-)  : dysuria (-), frequency (-), hematuria (-).   NEURO: numbness (-), weakness (-), dizziness (-)  SKIN: itching (-), rash (-)  HEENT:  visual changes (-); vertigo or throat pain (-);  neck stiffness (-)     Physical Exam:  T(F): 97.6 (02-27), Max: 97.8 (02-27)  HR: 64 (02-27) (63 - 65)  BP: 118/63 (02-27) (108/61 - 118/63)  RR: 13 (02-27)  SpO2: 96% (02-27)    GENERAL: NAD  HEAD:  Atraumatic, Normocephalic.  EYES: EOMI, PERRLA, conjunctiva and sclera clear.  NECK: Supple, No JVD.  CHEST/LUNG: Clear to auscultation bilaterally; No rales, rhonchi, wheezing, or rubs. Speaking in full sentences  HEART: Regular rate and rhythm  ABDOMEN: Bowel sounds present; Soft, Nontender, Nondistended.   EXTREMITIES:  2+ Peripheral Pulses, brisk capillary refill. No clubbing, cyanosis, or edema.  PSYCH: Normal affect.  SKIN: No rashes or lesions.    ECG: Personally reviewed    Echo: Personally reviewed    CXR: Personally reviewed    Labs: Personally reviewed                        12.6   4.35  )-----------( 250      ( 27 Feb 2023 10:27 )             40.2     02-27    141  |  103  |  38<H>  ----------------------------<  136<H>  3.8   |  28  |  1.04    Ca    9.0      27 Feb 2023 10:27  Mg     2.6     02-27    TPro  7.0  /  Alb  3.8  /  TBili  0.2  /  DBili  x   /  AST  16  /  ALT  15  /  AlkPhos  58  02-27    CARDIAC MARKERS ( 27 Feb 2023 13:16 )  39 ng/L / x     / x     / x     / x     / x      CARDIAC MARKERS ( 27 Feb 2023 10:27 )  34 ng/L / x     / x     / x     / x     / x      CARDIAC MARKERS ( 21 Feb 2023 08:16 )  73 ng/L / x     / x     / x     / x     / x      CARDIAC MARKERS ( 21 Feb 2023 01:33 )  77 ng/L / x     / x     / x     / x     / x      CARDIAC MARKERS ( 21 Feb 2023 00:23 )  37 ng/L / x     / x     / x     / x     / x        Serum Pro-Brain Natriuretic Peptide: 1185 pg/mL (02-27 @ 10:27)  Serum Pro-Brain Natriuretic Peptide: 3170 pg/mL (02-21 @ 00:23)    Thyroid Stimulating Hormone, Serum: 7.39 uIU/mL (02-21 @ 06:19)

## 2023-02-27 NOTE — H&P ADULT - NSHPSOCIALHISTORY_GEN_ALL_CORE
Social History:    Marital Status:  (   )    (   ) Single    (   )    ( x )   Occupation:   Lives with: ( x ) alone  (  ) children   (  ) spouse   (  ) parents  (  ) other    Substance Use (street drugs): ( x ) never used  (  ) other:  Tobacco Usage:  ( x  ) never smoked   (   ) former smoker   (   ) current smoker  (     ) pack years  (        ) last cigarette date  Alcohol Usage: no    (     ) Advanced Directives: (     ) None    (      ) DNR    (     ) DNI    (     ) Health Care Proxy:

## 2023-02-27 NOTE — H&P ADULT - NSHPPHYSICALEXAM_GEN_ALL_CORE
PHYSICAL EXAMINATION:  Vital Signs Last 24 Hrs  T(C): 36.4 (27 Feb 2023 15:37), Max: 36.6 (27 Feb 2023 11:11)  T(F): 97.5 (27 Feb 2023 15:37), Max: 97.8 (27 Feb 2023 11:11)  HR: 70 (27 Feb 2023 15:37) (63 - 70)  BP: 119/70 (27 Feb 2023 15:37) (108/61 - 119/70)  BP(mean): --  RR: 18 (27 Feb 2023 15:37) (13 - 20)  SpO2: 97% (27 Feb 2023 15:37) (95% - 97%)    Parameters below as of 27 Feb 2023 15:37  Patient On (Oxygen Delivery Method): room air      CAPILLARY BLOOD GLUCOSE          GENERAL: NAD, well-groomed, well-developed  HEAD:  atraumatic, normocephalic  EYES: sclera anicteric  ENMT: mucous membranes moist  NECK: supple, No JVD  CHEST/LUNG: clear to auscultation bilaterally; no rales, rhonchi, or wheezing b/l  HEART: normal S1, S2  ABDOMEN: BS+, soft, ND, NT   EXTREMITIES:  pulses palpable; no clubbing, cyanosis, or edema b/l LEs  NEURO: awake, alert, interactive; moves all extremities  SKIN: no rashes or lesions

## 2023-02-27 NOTE — H&P ADULT - HISTORY OF PRESENT ILLNESS
87F PMH hypothyroidism, HTN, recently diagnosed heart failure and afib with COVID in January 2023 (hospitalized at [facility]) admitted to [facility] 2/21 acute on chronic decompensated heart failure exacerbation with acute respiratory failure requiring bipap, mitral regurg, discharged 2 days agoPresenting to the emergency room with sudden onset nonradiating nonexertional sternal chest pain associated with dizziness and dyspnea.  Patient states her symptoms feel similar to the last time she came to the hospital prior to admission.  Patient was supposed to see her cardiologist tomorrow for evaluation for possible mitral valve clip versus biventricular pacemaker.  Patient denies chest pain at this time but endorses some difficulty breathing.  Denies nausea, abdominal pain, vomiting, diarrhea, fever, chills, urinary symptoms, cough.

## 2023-02-27 NOTE — ED PROVIDER NOTE - CLINICAL SUMMARY MEDICAL DECISION MAKING FREE TEXT BOX
87F PMH hypothyroidism, HTN, recently diagnosed heart failure and afib with COVID in January 2023 (hospitalized at [facility]) admitted to [facility] 2/21 acute on chronic decompensated heart failure exacerbation with acute respiratory failure requiring bipap, mitral regurg, discharged 2 days agoPresenting to the emergency room with sudden onset nonradiating nonexertional sternal chest pain associated with dizziness and dyspnea. Given hx and physical, ddx includes but is not limited to CHF exac, ACS, metabolic derangement. Plan for ecg, cxr, labs, likely TBA

## 2023-02-27 NOTE — ED ADULT NURSE NOTE - OBJECTIVE STATEMENT
PT is a 87 yr old female with pmh of CHF and irregular hear rhythm scheduled for pace maker procedure this week coming from home via ems for sob. PT states she was just discharged on Lasix for CHF and states the 20 mg of Lasix was not working but the 40 mg was. PT feels weak and sob- could not even get up out of the couch to let ems into her house. PT is a/ox 3-endorsing increased leg swelling bilaterally. Pt vitals stable- no oxygen needed at this time. Pt placed on Primafit for urine output.

## 2023-02-27 NOTE — ED PROVIDER NOTE - INTERPRETATION
"              After Visit Summary   11/21/2018    Alize Clifton    MRN: [id number]           Patient Information     Date Of Birth          1996        Visit Information        Provider Department      11/21/2018 3:15 PM Cody Jenkins MD [facility]        Today's Diagnoses     Incomplete miscarriage    -  1       Follow-ups after your visit        Future tests that were ordered for you today     Open Future Orders        Priority Expected Expires Ordered    US OB <14 Weeks w Transvaginal Single Routine  11/12/2019 11/12/2018            Who to contact     If you have questions or need follow up information about today's clinic visit or your schedule please contact [facility] directly at [phone].  Normal or non-critical lab and imaging results will be communicated to you by Logisticarehart, letter or phone within 4 business days after the clinic has received the results. If you do not hear from us within 7 days, please contact the clinic through Logisticarehart or phone. If you have a critical or abnormal lab result, we will notify you by phone as soon as possible.  Submit refill requests through meQuilibrium or call your pharmacy and they will forward the refill request to us. Please allow 3 business days for your refill to be completed.          Additional Information About Your Visit        MyChart Information     meQuilibrium lets you send messages to your doctor, view your test results, renew your prescriptions, schedule appointments and more. To sign up, go to [email or web address] . Click on \"Log in\" on the left side of the screen, which will take you to the Welcome page. Then click on \"Sign up Now\" on the right side of the page.     You will be asked to enter the access code listed below, as well as some personal information. Please follow the directions to create your username and password.     Your access code is: [email or web address]  Expires: 1/24/2019  9:00 PM     Your " access code will  in 90 days. If you need help or a new code, please call your [facility] clinic or [phone].        Care EveryWhere ID     This is your Care EveryWhere ID. This could be used by other organizations to access your [facility] medical records  [id number]        Your Vitals Were     Pulse Last Period BMI (Body Mass Index)             84 2018 (Exact Date) 35.89 kg/m2          Blood Pressure from Last 3 Encounters:   18 128/84   10/30/18 106/57   10/29/18 122/80    Weight from Last 3 Encounters:   18 103.9 kg (229 lb 2 oz)   10/30/18 103.4 kg (228 lb)   10/29/18 103.4 kg (228 lb)              Today, you had the following     No orders found for display       Primary Care Provider Office Phone # Fax #    Kisha Hugo [phone]       [facility] [address] [facility] [address]        Equal Access to Services     [facility]: Hadii aad ku hadasho Soomaali, waaxda luqadaha, qaybta kaalmada adeegyada, waxay idiin hayquiquen néstor cortez . So [facility] [phone].    ATENCIÓN: Si habla español, tiene a mireles disposición servicios gratuitos de asistencia lingüística. Llame al [phone].    We comply with applicable federal civil rights laws and [place] laws. We do not discriminate on the basis of race, color, national origin, age, disability, sex, sexual orientation, or gender identity.            Thank you!     Thank you for choosing [facility] AND [facility]  for your care. Our goal is always to provide you with excellent care. Hearing back from our patients is one way we can continue to improve our services. Please take a few minutes to complete the written survey that you may receive in the mail after your visit with us. Thank you!             Your Updated Medication List - Protect others around you: Learn how to safely use, store and throw away your medicines at www.disposemymeds.org.          This list is accurate as of 18  5:31 PM.   Always use your most recent med list.                   Brand Name Dispense Instructions for use Diagnosis    Prenatal Adult Gummy/DHA/FA 0.4-25 MG Chew      Take 1 tablet by mouth           normal

## 2023-02-27 NOTE — PATIENT PROFILE ADULT - FUNCTIONAL ASSESSMENT - BASIC MOBILITY 6.
3-calculated by average/Not able to assess (calculate score using [facility] averaging method)

## 2023-02-27 NOTE — ED PROVIDER NOTE - OBJECTIVE STATEMENT
87F PMH hypothyroidism, HTN, recently diagnosed heart failure and afib with COVID in January 2023 (hospitalized at [facility]) admitted to [facility] 2/21 acute on chronic decompensated heart failure exacerbation with acute respiratory failure requiring bipap, mitral regurg, discharged 2 days agoPresenting to the emergency room with sudden onset nonradiating nonexertional sternal chest pain associated with dizziness and dyspnea.  Patient states her symptoms feel similar to the last time she came to the hospital prior to admission.  Patient was supposed to see her cardiologist tomorrow for evaluation for possible mitral valve clip versus biventricular pacemaker.  Patient denies chest pain at this time but endorses some difficulty breathing.  Denies nausea, abdominal pain, vomiting, diarrhea, fever, chills, urinary symptoms, cough.

## 2023-02-27 NOTE — PATIENT PROFILE ADULT - FALL HARM RISK - HARM RISK INTERVENTIONS

## 2023-02-27 NOTE — H&P ADULT - NSHPLABSRESULTS_GEN_ALL_CORE
12.6   4.35  )-----------( 250      ( 2023 10:27 )             40.2           141  |  103  |  38<H>  ----------------------------<  136<H>  3.8   |  28  |  1.04    Ca    9.0      2023 10:27  Mg     2.6         TPro  7.0  /  Alb  3.8  /  TBili  0.2  /  DBili  x   /  AST  16  /  ALT  15  /  AlkPhos  58                Urinalysis Basic - ( 2023 15:20 )    Color: Colorless / Appearance: Clear / S.006 / pH: x  Gluc: x / Ketone: Negative  / Bili: Negative / Urobili: Negative   Blood: x / Protein: Negative / Nitrite: Negative   Leuk Esterase: Negative / RBC: 0 /hpf / WBC 0 /HPF   Sq Epi: x / Non Sq Epi: 0 /hpf / Bacteria: Negative    < from: Xray Chest 1 View- PORTABLE-Urgent (23 @ 11:24) >    IMPRESSION:  Clear lungs.    < end of copied text >    EKG SR NSST/T

## 2023-02-27 NOTE — ED PROVIDER NOTE - PROGRESS NOTE DETAILS
Patient reassessed, patient complains of dizziness when standing. Cardiology consulted. Patient lives by herself, will admit for stabilization. RGUJRAL Maribell Pacheco DO (PGY2): Spoke with Dr. Loja will admit to his service.  Patient's cardiologist and primary Dr. Mena admit to Dr. Loja as per hospitalist.

## 2023-02-27 NOTE — H&P ADULT - ASSESSMENT
87 f with    Dizziness  - hold Coreg  - hold Lasix  - orthostatic VS  - telemetry  - cardiology evaluation    Acute HF (heart failure).   - ARB  - hold Coreg  - hold Lasix  - strict ins and outs  - recent Echo noted  - daily weight   - plan for outpatient pacer upgrade.     Paroxysmal atrial fibrillation.   - continue amiodarone   - continue Eliquis.    Hypothyroid.   - levothyroxine   - follow TSH     HTN (hypertension).   - continue losartan.    DVT prophylaxis   - AC with Eliquis

## 2023-02-27 NOTE — ED PROVIDER NOTE - PHYSICAL EXAMINATION
GENERAL: Awake. Alert. NAD. Well nourished.  HEENT: NC/AT, PERRL, EOMI, Conjunctiva pink, no scleral icterus. Airway patent. Moist mucous membranes.  LUNGS: Rales at b/l bases.  CARDIAC: Chest non-tender to palpation. RRR.  ABDOMEN: No masses noted. Soft, NT, ND, no rebound, no guarding.  EXT: No edema, no calf tenderness, distal pulses 2+ bilaterally  NEURO: A&Ox3. Moving all extremities. Sensation and strength intact throughout.   SKIN: Warm and dry.   PSYCH: Normal affect.

## 2023-02-27 NOTE — PATIENT PROFILE ADULT - INTERNATIONAL TRAVEL
No Patient seen for paranoia, disorganized behavior. Chart reviewed.  No significant interval events since yesterday. Mental Hygiene  came this morning and saw Patient. He has the same psychiatric clinical presentation and continues to be encouraged to elevate his legs for the LE edema which he usually does not do but he does independently get up and walk around the unit, in and out of his room throughout the day. Seems to be tolerating the increased clozapine dose; denies adverse side effects Patient seen for paranoia, disorganized behavior. Chart reviewed.  No significant interval events since yesterday. Mental Hygiene  came this morning and saw Patient. He has the same psychiatric clinical presentation and continues to be encouraged to elevate his legs for the LE edema which he usually does not do but he does independently get up and walk around the unit, in and out of his room throughout the day. Seems to be tolerating the increased clozapine dose; denies adverse side effects. As per staff, other patients complaining that Patient is loud at night and they cannot sleep. Patient said his room is too warm at night but the hallway is cold, etc. He finally received some clothing and is wearing it today (complained the pants are too big but they fit fine)

## 2023-02-28 ENCOUNTER — TRANSCRIPTION ENCOUNTER (OUTPATIENT)
Age: 88
End: 2023-02-28

## 2023-02-28 LAB
CK MB CFR SERPL CALC: 1.8 NG/ML — SIGNIFICANT CHANGE UP (ref 0–3.8)
CULTURE RESULTS: SIGNIFICANT CHANGE UP
SPECIMEN SOURCE: SIGNIFICANT CHANGE UP
TSH SERPL-MCNC: 8.67 UIU/ML — HIGH (ref 0.27–4.2)

## 2023-02-28 PROCEDURE — 93010 ELECTROCARDIOGRAM REPORT: CPT

## 2023-02-28 RX ORDER — FAMOTIDINE 10 MG/ML
1 INJECTION INTRAVENOUS
Qty: 0 | Refills: 0 | DISCHARGE
Start: 2023-02-28

## 2023-02-28 RX ORDER — LEVOTHYROXINE SODIUM 125 MCG
125 TABLET ORAL DAILY
Refills: 0 | Status: DISCONTINUED | OUTPATIENT
Start: 2023-02-28 | End: 2023-02-28

## 2023-02-28 RX ORDER — LEVOTHYROXINE SODIUM 125 MCG
1 TABLET ORAL
Qty: 0 | Refills: 0 | DISCHARGE

## 2023-02-28 RX ORDER — LEVOTHYROXINE SODIUM 125 MCG
125 TABLET ORAL DAILY
Refills: 0 | Status: DISCONTINUED | OUTPATIENT
Start: 2023-03-01 | End: 2023-03-01

## 2023-02-28 RX ORDER — LEVOTHYROXINE SODIUM 125 MCG
1 TABLET ORAL
Qty: 30 | Refills: 0
Start: 2023-02-28 | End: 2023-03-29

## 2023-02-28 RX ORDER — POLYETHYLENE GLYCOL 3350 17 G/17G
17 POWDER, FOR SOLUTION ORAL DAILY
Refills: 0 | Status: DISCONTINUED | OUTPATIENT
Start: 2023-02-28 | End: 2023-03-01

## 2023-02-28 RX ADMIN — APIXABAN 2.5 MILLIGRAM(S): 2.5 TABLET, FILM COATED ORAL at 05:35

## 2023-02-28 RX ADMIN — FAMOTIDINE 20 MILLIGRAM(S): 10 INJECTION INTRAVENOUS at 11:00

## 2023-02-28 RX ADMIN — LOSARTAN POTASSIUM 25 MILLIGRAM(S): 100 TABLET, FILM COATED ORAL at 05:34

## 2023-02-28 RX ADMIN — AMIODARONE HYDROCHLORIDE 200 MILLIGRAM(S): 400 TABLET ORAL at 05:34

## 2023-02-28 RX ADMIN — Medication 112 MICROGRAM(S): at 05:34

## 2023-02-28 NOTE — PHYSICAL THERAPY INITIAL EVALUATION ADULT - PERTINENT HX OF CURRENT PROBLEM, REHAB EVAL
87F PMH hypothyroidism, HTN, recently diagnosed heart failure and afib with COVID in January 2023 (hospitalized at [facility]) admitted to [facility] 2/21 acute on chronic decompensated heart failure exacerbation with acute respiratory failure requiring bipap, mitral regurg, discharged 2 days agoPresenting to the emergency room with sudden onset nonradiating nonexertional sternal chest pain associated with dizziness and dyspnea.  Patient states her symptoms feel similar to the last time she came to the hospital prior to admission.  Patient was supposed to see her cardiologist tomorrow for evaluation for possible mitral valve clip versus biventricular pacemaker.  Patient denies chest pain at this time but endorses some difficulty breathing.  Denies nausea, abdominal pain, vomiting, diarrhea, fever, chills, urinary symptoms, cough. 2/27 Chest Xray: Clear lungs.

## 2023-02-28 NOTE — PHYSICAL THERAPY INITIAL EVALUATION ADULT - ADDITIONAL COMMENTS
Pt lives alone in an apartment, no steps required. Pt was independent with all functional mobility and ADLs prior to admission using a RW or rollator.

## 2023-02-28 NOTE — DISCHARGE NOTE PROVIDER - NSDCMRMEDTOKEN_GEN_ALL_CORE_FT
amiodarone 200 mg oral tablet: 1 tab(s) orally once a day  carvedilol 3.125 mg oral tablet: 1 tab(s) orally once a day (at bedtime)   Eliquis 2.5 mg oral tablet: 1 tab(s) orally 2 times a day  famotidine 20 mg oral tablet: 1 tab(s) orally once a day  Lasix 20 mg oral tablet: Please alternate daily between 1 tab daily (20mg total) and 2 tab daily (40mg total)    For example, on Sunday take 1 tab, Monday take 2 tab, Tuesday take 1 tab, Wednesday take 2 tab, and so on.  levothyroxine 112 mcg (0.112 mg) oral tablet: 1 tab(s) orally once a day  losartan 25 mg oral tablet: 1 tab(s) orally once a day   amiodarone 200 mg oral tablet: 1 tab(s) orally once a day  Eliquis 2.5 mg oral tablet: 1 tab(s) orally 2 times a day  famotidine 20 mg oral tablet: 1 tab(s) orally once a day  Lasix 40 mg oral tablet: Lasix 40mg every other day PRN if weight goes up by 3lb   Your current weight is  losartan 25 mg oral tablet: 1 tab(s) orally once a day   amiodarone 200 mg oral tablet: 1 tab(s) orally once a day  Eliquis 2.5 mg oral tablet: 1 tab(s) orally 2 times a day  famotidine 20 mg oral tablet: 1 tab(s) orally once a day  Lasix 40 mg oral tablet: Lasix 40mg every other day PRN if weight goes up by 3lb   Normal weight is 92 lbs  losartan 25 mg oral tablet: 1 tab(s) orally once a day   amiodarone 200 mg oral tablet: 1 tab(s) orally once a day  Eliquis 2.5 mg oral tablet: 1 tab(s) orally 2 times a day  famotidine 20 mg oral tablet: 1 tab(s) orally once a day  losartan 25 mg oral tablet: 1 tab(s) orally once a day  polyethylene glycol 3350 oral powder for reconstitution: 17 gram(s) orally once a day  Home med   amiodarone 200 mg oral tablet: 1 tab(s) orally once a day  Eliquis 2.5 mg oral tablet: 1 tab(s) orally 2 times a day  famotidine 20 mg oral tablet: 1 tab(s) orally once a day  Lasix 40 mg oral tablet: Lasix 40mg every other day PRN if weight goes up by 3lb   Baseline weight is 92 lbs  levothyroxine 125 mcg (0.125 mg) oral tablet: 1 tab(s) orally once a day  losartan 25 mg oral tablet: 1 tab(s) orally once a day  polyethylene glycol 3350 oral powder for reconstitution: 17 gram(s) orally once a day  Home med

## 2023-02-28 NOTE — DISCHARGE NOTE PROVIDER - NSDCCAREPROVSEEN_GEN_ALL_CORE_FT
Occupational Therapy      Patient Name:  Pasha Harmon   MRN:  [id number]    Patient not seen today secondary to Patient unwilling to participate(2/2 waiting on transport for d/c). Will follow-up 11/1/19.    Jessica Dobbs OT  10/31/2019   [place], Iker Hughes, Jerzy

## 2023-02-28 NOTE — DISCHARGE NOTE PROVIDER - CARE PROVIDER_API CALL
Prince Fink  CARDIOLOGY  [address] [place] [address]  Phone: [phone]  Fax: [phone]  Follow Up Time: 1-3 days   Prince Fink  CARDIOLOGY  [address] [place] [address]  Phone: [phone]  Fax: [phone]  Follow Up Time: 1-3 days    Florentin Adkins  GASTROENTEROLOGY  [address] [place] [address], [address]  [place], [place] [id number]  Phone: [phone]  Fax: [phone]  Follow Up Time: 1 week

## 2023-02-28 NOTE — PROGRESS NOTE ADULT - NSPROGADDITIONALINFOA_GEN_ALL_CORE
She had symptoms of orthostatic hypotension at home after taking both Furosemide and carvedilol.  May be dismissed today if feeling better and able to ambulate safely  Hold carvedilol  Furosemide 40 mg PRN for weight gain  Continue low dose losartan  Close follow up with her cardiologist Dr. Fink

## 2023-02-28 NOTE — PROGRESS NOTE ADULT - ASSESSMENT
87 y.o F with hx of newly diagnosed HFrEF (35%) 01/2023 at [facility].fib on eliquis, HTN, hypothyroid and recent admission at [facility] for ADHF, discharged on 2/25 who presents to the ED with a presyncopal episode.     Impression  Presyncopal episode likely in the setting of dehydration from overdiuresis in combination with bradycardia from new beta-blocker. Patient has previously not tolerated beta-blockers and was discharged on daily carvedilol for this reason.   - ECG: Sinus bradycardia with PVC  Trop: 34-> 39  BNP: 1185 previously 3170    Plan  - Orthostatics normal  - Losartan 25mg for now  - Continue eliquis 2.5mg Q12 (age > 80 and weight < 60)  - If asymptomatic with ambulation, can discharge off coreg and with lasix 40mg every other day PRN if her weight goes up by 3lb from her current dry weight with close follow up with her cardiologist DR. Laguna.    Recommendations are preliminary until attending attestation.    Mario Clay MD  Cardiology Fellow- PGY 4   87 y.o F with hx of newly diagnosed HFrEF (35%) 01/2023 at [facility].fib on eliquis, HTN, hypothyroid and recent admission at [facility] for ADHF, discharged on 2/25 who presents to the ED with a presyncopal episode.     Impression  Presyncopal episode likely in the setting of dehydration from overdiuresis in combination with bradycardia from new beta-blocker. Patient has previously not tolerated beta-blockers and was discharged on daily carvedilol for this reason.   - ECG: Sinus bradycardia with PVC  Trop: 34-> 39  BNP: 1185 previously 3170    Plan  - Orthostatics normal  - Losartan 25mg for now  - Continue eliquis 2.5mg Q12 (age > 80 and weight < 60)  - If asymptomatic with ambulation, can discharge off coreg and with lasix 40mg every other day PRN if her weight goes up by 3lb from her current dry weight with close follow up with her cardiologist DR. Laguna.        Mario Clay MD  Cardiology Fellow- PGY 4

## 2023-02-28 NOTE — PROGRESS NOTE ADULT - ASSESSMENT
87 f with    Dizziness  - hold Coreg  - hold Lasix  - orthostatic VS  - telemetry  - cardiology follow    Acute HF (heart failure).   - ARB  - hold Coreg  - hold Lasix  - strict ins and outs  - recent Echo noted  - daily weight   - plan for outpatient pacer upgrade.     Paroxysmal atrial fibrillation.   - continue amiodarone   - continue Eliquis.    Hypothyroid.   - levothyroxine   - follow TSH     HTN (hypertension).   - continue losartan.    DVT prophylaxis   - AC with Eliquis    d/w patient and ACP     DCP home.     Iker Loja MD phone [phone]

## 2023-02-28 NOTE — DISCHARGE NOTE PROVIDER - NSDCFUADDAPPT_GEN_ALL_CORE_FT
APPTS ARE READY TO BE MADE: [c] YES    Best Family or Patient Contact (if needed):    Additional Information about above appointments (if needed):    1: Cardiologist - Dr. Fink in 2 - 3 days  2:   3:     Other comments or requests:    APPTS ARE READY TO BE MADE: [x] YES    Best Family or Patient Contact (if needed):    Additional Information about above appointments (if needed):    1: Cardiologist - Dr. Fink in 2 - 3 days  2:   3:     Other comments or requests:    APPTS ARE READY TO BE MADE: [x] YES    Best Family or Patient Contact (if needed):    Additional Information about above appointments (if needed):    1: Cardiologist - Dr. Fink in 2 - 3 days  2:   3:     Other comments or requests:   Patient was previously scheduled with Prince West on (3/2/2023 1:20 PM) at [address] [place] [address].  Patient was previously scheduled with Florentin Lacy at [address] [address]. Patient did not have details readily available at the time of my outreach.

## 2023-02-28 NOTE — DISCHARGE NOTE PROVIDER - PROVIDER TOKENS
PROVIDER:[TOKEN:[[id number]],FOLLOWUP:[1-3 days]] PROVIDER:[TOKEN:[[id number]],FOLLOWUP:[1-3 days]],PROVIDER:[TOKEN:[[id number]],FOLLOWUP:[1 week]]

## 2023-02-28 NOTE — DISCHARGE NOTE PROVIDER - HOSPITAL COURSE
87 y.o F with hx of newly diagnosed HFrEF (35%) 01/2023 at [facility].fib on eliquis, HTN, hypothyroid and recent admission at [facility] for ADHF, discharged on 2/25 who presents to the ED with sudden onset nonradiating nonexertional sternal chest pain associated with dizziness and dyspnea.  Admitted for Presyncopal episode likely in the setting of dehydration from overdiuresis in combination with bradycardia from new beta-blocker.   Patient has previously not tolerated beta-blockers and was discharged on daily carvedilol for this reason.   - ECG: Sinus bradycardia with PVC  -Trop: 34-> 39  - BNP: 1185 previously 3170  - Orthostatics normal  - Losartan 25mg for now  - Continue Eliquis 2.5mg Q12 (age > 80 and weight < 60)  - If asymptomatic with ambulation, can discharge off coreg and with lasix 40mg every other day PRN if her weight goes up by 3lb from her current dry weight with close follow up with her cardiologist Dr. Laguna. 87 y.o F with hx of newly diagnosed HFrEF (35%) 01/2023 at [facility].fib on eliquis, HTN, hypothyroid and recent admission at [facility] for ADHF, discharged on 2/25 who presents to the ED with sudden onset nonradiating nonexertional sternal chest pain associated with dizziness and dyspnea.    Admitted for Presyncopal episode likely in the setting of dehydration from overdiuresis in combination with bradycardia from new beta-blocker.   Patient has previously not tolerated beta-blockers and was discharged on daily carvedilol for this reason.   - Cardiologist consulted  - ECG: Sinus bradycardia with PVC  - Trop: 34-> 39  - BNP: 1185 previously 3170  - Orthostatics normal  - Losartan 25mg for now  - Continue Eliquis 2.5mg Q12 (age > 80 and weight < 60)  - Pt tolerated OOB activity, asymptomatic and per cardiology can discharge off coreg and with lasix 40mg every other day PRN if her weight goes up by 3lb from her current dry weight with close follow up with her cardiologist Dr. Laguna.  - PT rec Home PT    Hypothyroidism  - TSH 8.67  - Synthroid increased to 125mg daily  - Follow up Thyroid panel in 4 - 6 weeks     87 y.o F with hx of newly diagnosed HFrEF (35%) 01/2023 at [facility].fib on eliquis, HTN, hypothyroid and recent admission at [facility] for ADHF, discharged on 2/25 who presents to the ED with sudden onset nonradiating nonexertional sternal chest pain associated with dizziness and dyspnea.    Admitted for Presyncopal episode likely in the setting of dehydration from overdiuresis in combination with bradycardia from new beta-blocker.   Patient has previously not tolerated beta-blockers and was discharged on daily carvedilol for this reason.   - Cardiologist consulted  - ECG: Sinus bradycardia with PVC  - Trop: 34-> 39  - BNP: 1185 previously 3170  - Orthostatics normal  - Losartan 25mg for now  - Continue Eliquis 2.5mg Q12 (age > 80 and weight < 60)  - Pt tolerated OOB activity, asymptomatic and per cardiology can discharge off coreg and with lasix 40mg every other day PRN if her weight goes up by 3lb from her current dry weight with close follow up with her cardiologist Dr. Laguna.  - PT rec Home PT    Hypothyroidism  - TSH 8.67  - Synthroid increased to 125mg daily  - Follow up Thyroid panel in 4 - 6 weeks    Medically cleared by Cardiology and Dr. Loja to discharge pt home with Home PT

## 2023-02-28 NOTE — PHYSICAL THERAPY INITIAL EVALUATION ADULT - WEIGHT-BEARING RESTRICTIONS: GAIT, REHAB EVAL
full weight-bearing

## 2023-02-28 NOTE — DISCHARGE NOTE PROVIDER - NSDCCPCAREPLAN_GEN_ALL_CORE_FT
PRINCIPAL DISCHARGE DIAGNOSIS  Diagnosis: Postural dizziness with presyncope  Assessment and Plan of Treatment:       SECONDARY DISCHARGE DIAGNOSES  Diagnosis: CHF exacerbation  Assessment and Plan of Treatment:     Diagnosis: Chronic systolic heart failure  Assessment and Plan of Treatment:     Diagnosis: Chronic atrial fibrillation  Assessment and Plan of Treatment:     Diagnosis: Hypertension  Assessment and Plan of Treatment:     Diagnosis: Hypothyroidism  Assessment and Plan of Treatment:      PRINCIPAL DISCHARGE DIAGNOSIS  Diagnosis: Postural dizziness with presyncope  Assessment and Plan of Treatment: Presyncopal episode likely in the setting of dehydration from Lasix in combination with bradycardia from new beta-blocker.   Previously not tolerated beta-blockers and was discharged on daily carvedilol for this reason.   - BNP: 1185 previously 3170 - Not in Heart failure  - Orthostatics normal - B/P Normal  - Continue Losartan 25mg for now  - Pt tolerated OOB activity.  Cardiology cleared you for discharge off coreg and with lasix 40mg every other day as needed if her weight goes up by 3lb from her current dry weight. You baseline weight is 92lbs  Follow up with her cardiologist Dr. Laguna within 1 week  - PT rec Home PT      SECONDARY DISCHARGE DIAGNOSES  Diagnosis: Chronic systolic heart failure  Assessment and Plan of Treatment: Take lasix 40mg every other day as needed if her weight goes up by 3lb from her current dry weight.   You baseline weight is 92lbs.  Weigh yourself daily.  If you gain 3lbs in 3 days, or 5lbs in a week call your Health Care Provider.  Do not eat or drink foods containing more than 2000mg of salt (sodium) in your diet every day.  Call your Health Care Provider if you have any swelling or increased swelling in your feet, ankles, and/or stomach.  Take all of your medication as directed.  If you become dizzy call your Health Care Provider.      Diagnosis: Chronic atrial fibrillation  Assessment and Plan of Treatment: - ECG: Sinus bradycardia with PVC-  - Discontinued Coreg  - Continue Eliquis 2.5mg Q12   Atrial fibrillation is the most common heart rhythm problem.  The condition puts you at risk for has stroke and heart attack  Continue Eliquis - blood thinners to prevent possible clot and stroke complications.   Monitor for any signs of bleeding and avoid injury while on this medication  If any bleeding persistent and symptomatic stop the medication and notify your doctor immediately.  It helps if you control your blood pressure, not drink more than 1-2 alcohol drinks per day, cut down on caffeine, getting treatment for over active thyroid gland, and get regular exercise  Call your doctor if you feel your heart racing or beating unusually, chest tightness or pain, lightheaded, faint, shortness of breath especially with exercise  It is important to take your heart medication as prescribed      Diagnosis: Hypertension  Assessment and Plan of Treatment: Continue Losartan for blood pressure ocntrol  Eat a heart healthy diet that is low in saturated fats and salt, and includes whole grains, fruits, vegetables and lean protein   Exercise regularly (consult with your physician or cardiologist first); maintain a heart healthy weight.   Continue to follow with your primary physician or cardiologist.   Seek medical help for dizziness, Lightheadedness, Blurry vision, Headache, Chest pain, Shortness of breath  Follow up with your medical doctor to establish long term blood pressure treatment goals.      Diagnosis: Hypothyroidism  Assessment and Plan of Treatment: - TSH 8.67  - Synthroid increased to 125mg daily  - Follow up with PMD to check your Thyroid level in 4 - 6 weeks

## 2023-03-01 ENCOUNTER — TRANSCRIPTION ENCOUNTER (OUTPATIENT)
Age: 88
End: 2023-03-01

## 2023-03-01 VITALS
OXYGEN SATURATION: 97 % | SYSTOLIC BLOOD PRESSURE: 130 MMHG | RESPIRATION RATE: 18 BRPM | DIASTOLIC BLOOD PRESSURE: 71 MMHG | HEART RATE: 74 BPM | TEMPERATURE: 98 F

## 2023-03-01 PROCEDURE — 97161 PT EVAL LOW COMPLEX 20 MIN: CPT

## 2023-03-01 PROCEDURE — 82803 BLOOD GASES ANY COMBINATION: CPT

## 2023-03-01 PROCEDURE — 82947 ASSAY GLUCOSE BLOOD QUANT: CPT

## 2023-03-01 PROCEDURE — 82553 CREATINE MB FRACTION: CPT

## 2023-03-01 PROCEDURE — 93005 ELECTROCARDIOGRAM TRACING: CPT

## 2023-03-01 PROCEDURE — 85025 COMPLETE CBC W/AUTO DIFF WBC: CPT

## 2023-03-01 PROCEDURE — 84132 ASSAY OF SERUM POTASSIUM: CPT

## 2023-03-01 PROCEDURE — 80053 COMPREHEN METABOLIC PANEL: CPT

## 2023-03-01 PROCEDURE — 83735 ASSAY OF MAGNESIUM: CPT

## 2023-03-01 PROCEDURE — 85014 HEMATOCRIT: CPT

## 2023-03-01 PROCEDURE — 71045 X-RAY EXAM CHEST 1 VIEW: CPT

## 2023-03-01 PROCEDURE — 83605 ASSAY OF LACTIC ACID: CPT

## 2023-03-01 PROCEDURE — 84295 ASSAY OF SERUM SODIUM: CPT

## 2023-03-01 PROCEDURE — 36415 COLL VENOUS BLD VENIPUNCTURE: CPT

## 2023-03-01 PROCEDURE — 99285 EMERGENCY DEPT VISIT HI MDM: CPT

## 2023-03-01 PROCEDURE — 87637 SARSCOV2&INF A&B&RSV AMP PRB: CPT

## 2023-03-01 PROCEDURE — 82435 ASSAY OF BLOOD CHLORIDE: CPT

## 2023-03-01 PROCEDURE — 84484 ASSAY OF TROPONIN QUANT: CPT

## 2023-03-01 PROCEDURE — 85018 HEMOGLOBIN: CPT

## 2023-03-01 PROCEDURE — 82330 ASSAY OF CALCIUM: CPT

## 2023-03-01 PROCEDURE — 87086 URINE CULTURE/COLONY COUNT: CPT

## 2023-03-01 PROCEDURE — 83880 ASSAY OF NATRIURETIC PEPTIDE: CPT

## 2023-03-01 PROCEDURE — 81001 URINALYSIS AUTO W/SCOPE: CPT

## 2023-03-01 PROCEDURE — 84443 ASSAY THYROID STIM HORMONE: CPT

## 2023-03-01 PROCEDURE — 82550 ASSAY OF CK (CPK): CPT

## 2023-03-01 PROCEDURE — 82565 ASSAY OF CREATININE: CPT

## 2023-03-01 RX ORDER — FUROSEMIDE 40 MG
40 TABLET ORAL ONCE
Refills: 0 | Status: COMPLETED | OUTPATIENT
Start: 2023-03-01 | End: 2023-03-01

## 2023-03-01 RX ORDER — POLYETHYLENE GLYCOL 3350 17 G/17G
17 POWDER, FOR SOLUTION ORAL
Qty: 0 | Refills: 0 | DISCHARGE
Start: 2023-03-01

## 2023-03-01 RX ORDER — FUROSEMIDE 40 MG
1 TABLET ORAL
Qty: 15 | Refills: 0
Start: 2023-03-01 | End: 2023-03-30

## 2023-03-01 RX ORDER — FUROSEMIDE 40 MG
1 TABLET ORAL
Qty: 0 | Refills: 0 | DISCHARGE

## 2023-03-01 RX ADMIN — AMIODARONE HYDROCHLORIDE 200 MILLIGRAM(S): 400 TABLET ORAL at 06:34

## 2023-03-01 RX ADMIN — APIXABAN 2.5 MILLIGRAM(S): 2.5 TABLET, FILM COATED ORAL at 06:34

## 2023-03-01 RX ADMIN — LOSARTAN POTASSIUM 25 MILLIGRAM(S): 100 TABLET, FILM COATED ORAL at 06:34

## 2023-03-01 RX ADMIN — POLYETHYLENE GLYCOL 3350 17 GRAM(S): 17 POWDER, FOR SOLUTION ORAL at 11:19

## 2023-03-01 RX ADMIN — Medication 125 MICROGRAM(S): at 06:34

## 2023-03-01 RX ADMIN — Medication 40 MILLIGRAM(S): at 13:37

## 2023-03-01 RX ADMIN — FAMOTIDINE 20 MILLIGRAM(S): 10 INJECTION INTRAVENOUS at 11:19

## 2023-03-01 NOTE — PROGRESS NOTE ADULT - ASSESSMENT
87 f with    Dizziness resolved  - hold Coreg  - Lasix  - orthostatic VS  - telemetry  - cardiology follow    Acute HF (heart failure).   - ARB  - hold Coreg  - Lasix  - strict ins and outs  - recent Echo noted  - daily weight   - plan for outpatient pacer upgrade.     Paroxysmal atrial fibrillation.   - continue amiodarone   - continue Eliquis.    Hypothyroid.   - levothyroxine   - follow TSH     HTN (hypertension).   - continue losartan.    DVT prophylaxis   - AC with Eliquis    d/w patient and ACP     DC home. Follow with PMD/ Cardiology in 3-4 days.      Iker Loja MD phone [phone]

## 2023-03-01 NOTE — PROGRESS NOTE ADULT - SUBJECTIVE AND OBJECTIVE BOX
Cardiology Progress Note  ------------------------------------------------------------------------------------------  SUBJECTIVE:   Patient observed overnight with normal orthostatic BP in the setting of holding BB and diuretics. She has not experienced any dizziness transferring from the bed to the chair though she has not ambulated yet today.   -------------------------------------------------------------------------------------------  ROS:  CV: chest pain (-), palpitation (-), orthopnea (-), PND (-), edema (-)  PULM: SOB (-), cough (-), wheezing (-), hemoptysis (-).   CONST: fever (-), chills (-) or fatigue (-)  GI: abdominal distension (-), abdominal pain (-) , nausea/vomiting (-), hematemesis, (-), melena (-), hematochezia (-)  : dysuria (-), frequency (-), hematuria (-).   NEURO: numbness (-), weakness (-), dizziness (-)  SKIN: itching (-), rash (-)  HEENT:  visual changes (-); vertigo or throat pain (-);  neck stiffness (-)     All other review of systems is negative unless indicated above.   -------------------------------------------------------------------------------------------  VS:  T(F): 97.5 (02-28), Max: 98.4 (02-28)  HR: 97 (02-28) (57 - 106)  BP: 103/61 (02-28) (103/61 - 138/84)  RR: 18 (02-28)  SpO2: 97% (02-28)  I&O's Summary    28 Feb 2023 07:01  -  28 Feb 2023 14:43  --------------------------------------------------------  IN: 100 mL / OUT: 0 mL / NET: 100 mL      ------------------------------------------------------------------------------------------  PHYSICAL EXAM:  GENERAL: NAD  HEAD:  Atraumatic, Normocephalic.  EYES: EOMI, PERRLA, conjunctiva and sclera clear.  ENT: Moist mucous membranes.  NECK: Supple, No JVD.  CHEST/LUNG: Clear to auscultation bilaterally; No rales, rhonchi, wheezing, or rubs. Unlabored respirations.  HEART: Regular rate and rhythm; No murmurs, rubs, or gallops.  ABDOMEN: Bowel sounds present; Soft, Nontender, Nondistended.   EXTREMITIES:  2+ Peripheral Pulses, brisk capillary refill. No clubbing, cyanosis, or edema.  PSYCH: Normal affect.  SKIN: No rashes or lesions.  -------------------------------------------------------------------------------------------  LABS:                          12.6   4.35  )-----------( 250      ( 27 Feb 2023 10:27 )             40.2     02-27    141  |  103  |  38<H>  ----------------------------<  136<H>  3.8   |  28  |  1.04    Ca    9.0      27 Feb 2023 10:27  Mg     2.6     02-27    TPro  7.0  /  Alb  3.8  /  TBili  0.2  /  DBili  x   /  AST  16  /  ALT  15  /  AlkPhos  58  02-27    CARDIAC MARKERS ( 27 Feb 2023 13:16 )  39 ng/L / x     / x     / x     / x     / x      CARDIAC MARKERS ( 27 Feb 2023 10:27 )  34 ng/L / x     / x     / x     / x     / x        -------------------------------------------------------------------------------------------  Meds:  acetaminophen     Tablet .. 650 milliGRAM(s) Oral every 6 hours PRN  aMIOdarone    Tablet 200 milliGRAM(s) Oral daily  apixaban 2.5 milliGRAM(s) Oral every 12 hours  famotidine    Tablet 20 milliGRAM(s) Oral daily  levothyroxine 112 MICROGram(s) Oral daily  losartan 25 milliGRAM(s) Oral daily    -------------------------------------------------------------------------------------------  
Patient is a 87y old  Female who presents with a chief complaint of     SUBJECTIVE / OVERNIGHT EVENTS: Comfortable without new complaints.   Review of Systems  chest pain no  palpitations no  sob no  nausea no  headache no    MEDICATIONS  (STANDING):  aMIOdarone    Tablet 200 milliGRAM(s) Oral daily  apixaban 2.5 milliGRAM(s) Oral every 12 hours  famotidine    Tablet 20 milliGRAM(s) Oral daily  levothyroxine 125 MICROGram(s) Oral daily  losartan 25 milliGRAM(s) Oral daily  polyethylene glycol 3350 17 Gram(s) Oral daily    MEDICATIONS  (PRN):  acetaminophen     Tablet .. 650 milliGRAM(s) Oral every 6 hours PRN Temp greater or equal to 38.5C (101.3F), Mild Pain (1 - 3)      Vital Signs Last 24 Hrs  T(C): 36.6 (01 Mar 2023 11:47), Max: 36.6 (2023 20:41)  T(F): 97.8 (01 Mar 2023 11:47), Max: 97.8 (2023 20:41)  HR: 74 (01 Mar 2023 11:47) (61 - 74)  BP: 130/71 (01 Mar 2023 11:47) (101/60 - 130/71)  BP(mean): 93 (2023 16:05) (93 - 93)  RR: 18 (01 Mar 2023 11:47) (18 - 18)  SpO2: 97% (01 Mar 2023 11:47) (95% - 98%)    Parameters below as of 01 Mar 2023 11:47  Patient On (Oxygen Delivery Method): room air        PHYSICAL EXAM:  GENERAL: NAD  HEAD:  Atraumatic, Normocephalic  EYES: EOMI, PERRLA, conjunctiva and sclera clear  NECK: Supple, No JVD  CHEST/LUNG: Clear to auscultation bilaterally; No wheeze  HEART: Regular rate and rhythm; No murmurs, rubs, or gallops  ABDOMEN: Soft, Nontender, Nondistended; Bowel sounds present  EXTREMITIES:  2+ Peripheral Pulses, No clubbing, cyanosis, or edema  PSYCH: AAOx3  NEUROLOGY: non-focal  SKIN: No rashes or lesions    LABS:            CARDIAC MARKERS ( 2023 17:26 )  x     / x     / 30 U/L / x     / 1.8 ng/mL      Urinalysis Basic - ( 2023 15:20 )    Color: Colorless / Appearance: Clear / S.006 / pH: x  Gluc: x / Ketone: Negative  / Bili: Negative / Urobili: Negative   Blood: x / Protein: Negative / Nitrite: Negative   Leuk Esterase: Negative / RBC: 0 /hpf / WBC 0 /HPF   Sq Epi: x / Non Sq Epi: 0 /hpf / Bacteria: Negative        Culture - Urine (collected 2023 15:20)  Source: Clean Catch Clean Catch (Midstream)  Final Report (2023 15:18):    <10,000 CFU/mL Normal Urogenital Shea        RADIOLOGY & ADDITIONAL TESTS:    Imaging Personally Reviewed:    Consultant(s) Notes Reviewed:      Care Discussed with Consultants/Other Providers:  
Patient is a 87y old  Female who presents with a chief complaint of     SUBJECTIVE / OVERNIGHT EVENTS: c/o dizziness' and epigastric discomfort.   Review of Systems  chest pain no  palpitations no  sob no  nausea no  headache no    MEDICATIONS  (STANDING):  aMIOdarone    Tablet 200 milliGRAM(s) Oral daily  apixaban 2.5 milliGRAM(s) Oral every 12 hours  famotidine    Tablet 20 milliGRAM(s) Oral daily  losartan 25 milliGRAM(s) Oral daily  polyethylene glycol 3350 17 Gram(s) Oral daily    MEDICATIONS  (PRN):  acetaminophen     Tablet .. 650 milliGRAM(s) Oral every 6 hours PRN Temp greater or equal to 38.5C (101.3F), Mild Pain (1 - 3)      Vital Signs Last 24 Hrs  T(C): 36.4 (2023 10:47), Max: 36.9 (2023 04:53)  T(F): 97.5 (2023 10:47), Max: 98.4 (2023 04:53)  HR: 62 (2023 16:05) (57 - 106)  BP: 126/77 (2023 16:05) (103/61 - 138/84)  BP(mean): 93 (2023 16:05) (93 - 93)  RR: 18 (2023 10:47) (18 - 18)  SpO2: 98% (2023 16:05) (94% - 99%)    Parameters below as of 2023 16:05  Patient On (Oxygen Delivery Method): room air        PHYSICAL EXAM:  GENERAL: NAD  HEAD:  Atraumatic, Normocephalic  EYES: EOMI, PERRLA, conjunctiva and sclera clear  NECK: Supple, No JVD  CHEST/LUNG: Clear to auscultation bilaterally; No wheeze  HEART: Regular rate and rhythm; No murmurs, rubs, or gallops  ABDOMEN: Soft, Nontender, Nondistended; Bowel sounds present  EXTREMITIES:  2+ Peripheral Pulses, No clubbing, cyanosis, or edema  PSYCH: AAOx3  NEUROLOGY: non-focal  SKIN: No rashes or lesions    LABS:                        12.6   4.35  )-----------( 250      ( 2023 10:27 )             40.2     02-27    141  |  103  |  38<H>  ----------------------------<  136<H>  3.8   |  28  |  1.04    Ca    9.0      2023 10:27  Mg     2.6         TPro  7.0  /  Alb  3.8  /  TBili  0.2  /  DBili  x   /  AST  16  /  ALT  15  /  AlkPhos  58        CARDIAC MARKERS ( 2023 17:26 )  x     / x     / 30 U/L / x     / 1.8 ng/mL      Urinalysis Basic - ( 2023 15:20 )    Color: Colorless / Appearance: Clear / S.006 / pH: x  Gluc: x / Ketone: Negative  / Bili: Negative / Urobili: Negative   Blood: x / Protein: Negative / Nitrite: Negative   Leuk Esterase: Negative / RBC: 0 /hpf / WBC 0 /HPF   Sq Epi: x / Non Sq Epi: 0 /hpf / Bacteria: Negative        Culture - Urine (collected 2023 15:20)  Source: Clean Catch Clean Catch (Midstream)  Final Report (2023 15:18):    <10,000 CFU/mL Normal Urogenital Shea        RADIOLOGY & ADDITIONAL TESTS:    Imaging Personally Reviewed:    Consultant(s) Notes Reviewed:      Care Discussed with Consultants/Other Providers:

## 2023-03-01 NOTE — DISCHARGE NOTE NURSING/CASE MANAGEMENT/SOCIAL WORK - NSDCFUADDAPPT_GEN_ALL_CORE_FT
APPTS ARE READY TO BE MADE: [x] YES    Best Family or Patient Contact (if needed):    Additional Information about above appointments (if needed):    1: Cardiologist - Dr. Fink in 2 - 3 days  2:   3:     Other comments or requests:

## 2023-03-01 NOTE — DISCHARGE NOTE NURSING/CASE MANAGEMENT/SOCIAL WORK - PATIENT PORTAL LINK FT
You can access the FollowMyHealth Patient Portal offered by [facility] by registering at the following website: [email or web address]. By joining [facility]’s FollowMyHealth portal, you will also be able to view your health information using other applications (apps) compatible with our system.

## 2023-03-01 NOTE — CHART NOTE - NSCHARTNOTEFT_GEN_A_CORE
Left (1) message(s) for patient in regards to follow up care with callback information.
Left a message for patient in regards to follow up care with callback information.
Pt reported Epigastric/ Chest pressure with nausea - 7/10 post ambulation. Denies SOB    Vital Signs Last 24 Hrs  HR: 62 (28 Feb 2023 16:05) (57 - 106)  BP: 126/77 (28 Feb 2023 16:05) (103/61 - 138/84)  RR: 18 (28 Feb 2023 10:47) (18 - 18)  SpO2: 98% (28 Feb 2023 16:05) (94% - 99%) RA      Cardiac enzymes - no elevation  EKG - SR, PVC 63/mt  Chest/Epigastric resolved with rest  Discharge on hold   Cardiology to follow up     [id number]
Patient ambulated in hallway and tolerated well. No distress reported.  Pt with 1lb weight gain in 24 hrs - Lasix 40mg PO x 1 given per cards rec  Discussed with Dr. Loja - concurs with Lasix     [id number]

## 2023-07-08 ENCOUNTER — INPATIENT (INPATIENT)
Facility: HOSPITAL | Age: 88
LOS: 4 days | Discharge: HOME HEALTH | DRG: 291 | End: 2023-07-13
Attending: INTERNAL MEDICINE | Admitting: INTERNAL MEDICINE
Payer: MEDICARE

## 2023-07-08 VITALS
TEMPERATURE: 98 F | SYSTOLIC BLOOD PRESSURE: 132 MMHG | HEART RATE: 70 BPM | OXYGEN SATURATION: 96 % | HEIGHT: 60 IN | DIASTOLIC BLOOD PRESSURE: 83 MMHG | WEIGHT: 89.95 LBS | RESPIRATION RATE: 18 BRPM

## 2023-07-08 DIAGNOSIS — H25.019 CORTICAL AGE-RELATED CATARACT, UNSPECIFIED EYE: Chronic | ICD-10-CM

## 2023-07-08 DIAGNOSIS — R07.9 CHEST PAIN, UNSPECIFIED: ICD-10-CM

## 2023-07-08 LAB
ALBUMIN SERPL ELPH-MCNC: 4.2 G/DL — SIGNIFICANT CHANGE UP (ref 3.3–5)
ALP SERPL-CCNC: 69 U/L — SIGNIFICANT CHANGE UP (ref 40–120)
ALT FLD-CCNC: 13 U/L — SIGNIFICANT CHANGE UP (ref 10–45)
ANION GAP SERPL CALC-SCNC: 13 MMOL/L — SIGNIFICANT CHANGE UP (ref 5–17)
APTT BLD: 38.1 SEC — HIGH (ref 27.5–35.5)
AST SERPL-CCNC: 16 U/L — SIGNIFICANT CHANGE UP (ref 10–40)
BASOPHILS # BLD AUTO: 0.04 K/UL — SIGNIFICANT CHANGE UP (ref 0–0.2)
BASOPHILS NFR BLD AUTO: 0.7 % — SIGNIFICANT CHANGE UP (ref 0–2)
BILIRUB SERPL-MCNC: 0.2 MG/DL — SIGNIFICANT CHANGE UP (ref 0.2–1.2)
BUN SERPL-MCNC: 30 MG/DL — HIGH (ref 7–23)
CALCIUM SERPL-MCNC: 9 MG/DL — SIGNIFICANT CHANGE UP (ref 8.4–10.5)
CHLORIDE SERPL-SCNC: 102 MMOL/L — SIGNIFICANT CHANGE UP (ref 96–108)
CK MB BLD-MCNC: 6.2 % — HIGH (ref 0–3.5)
CK MB CFR SERPL CALC: 3.1 NG/ML — SIGNIFICANT CHANGE UP (ref 0–3.8)
CK SERPL-CCNC: 50 U/L — SIGNIFICANT CHANGE UP (ref 25–170)
CO2 SERPL-SCNC: 24 MMOL/L — SIGNIFICANT CHANGE UP (ref 22–31)
CREAT SERPL-MCNC: 1.21 MG/DL — SIGNIFICANT CHANGE UP (ref 0.5–1.3)
EGFR: 44 ML/MIN/1.73M2 — LOW
EOSINOPHIL # BLD AUTO: 0.16 K/UL — SIGNIFICANT CHANGE UP (ref 0–0.5)
EOSINOPHIL NFR BLD AUTO: 2.8 % — SIGNIFICANT CHANGE UP (ref 0–6)
GLUCOSE SERPL-MCNC: 94 MG/DL — SIGNIFICANT CHANGE UP (ref 70–99)
HCT VFR BLD CALC: 41.7 % — SIGNIFICANT CHANGE UP (ref 34.5–45)
HGB BLD-MCNC: 13 G/DL — SIGNIFICANT CHANGE UP (ref 11.5–15.5)
IMM GRANULOCYTES NFR BLD AUTO: 0.4 % — SIGNIFICANT CHANGE UP (ref 0–0.9)
INR BLD: 1.24 RATIO — HIGH (ref 0.88–1.16)
LYMPHOCYTES # BLD AUTO: 0.76 K/UL — LOW (ref 1–3.3)
LYMPHOCYTES # BLD AUTO: 13.3 % — SIGNIFICANT CHANGE UP (ref 13–44)
MAGNESIUM SERPL-MCNC: 3.2 MG/DL — HIGH (ref 1.6–2.6)
MCHC RBC-ENTMCNC: 27.5 PG — SIGNIFICANT CHANGE UP (ref 27–34)
MCHC RBC-ENTMCNC: 31.2 GM/DL — LOW (ref 32–36)
MCV RBC AUTO: 88.2 FL — SIGNIFICANT CHANGE UP (ref 80–100)
MONOCYTES # BLD AUTO: 0.49 K/UL — SIGNIFICANT CHANGE UP (ref 0–0.9)
MONOCYTES NFR BLD AUTO: 8.6 % — SIGNIFICANT CHANGE UP (ref 2–14)
NEUTROPHILS # BLD AUTO: 4.24 K/UL — SIGNIFICANT CHANGE UP (ref 1.8–7.4)
NEUTROPHILS NFR BLD AUTO: 74.2 % — SIGNIFICANT CHANGE UP (ref 43–77)
NRBC # BLD: 0 /100 WBCS — SIGNIFICANT CHANGE UP (ref 0–0)
NT-PROBNP SERPL-SCNC: 3619 PG/ML — HIGH (ref 0–300)
PLATELET # BLD AUTO: 217 K/UL — SIGNIFICANT CHANGE UP (ref 150–400)
POTASSIUM SERPL-MCNC: 4.5 MMOL/L — SIGNIFICANT CHANGE UP (ref 3.5–5.3)
POTASSIUM SERPL-SCNC: 4.5 MMOL/L — SIGNIFICANT CHANGE UP (ref 3.5–5.3)
PROT SERPL-MCNC: 7.5 G/DL — SIGNIFICANT CHANGE UP (ref 6–8.3)
PROTHROM AB SERPL-ACNC: 14.3 SEC — HIGH (ref 10.5–13.4)
RAPID RVP RESULT: SIGNIFICANT CHANGE UP
RBC # BLD: 4.73 M/UL — SIGNIFICANT CHANGE UP (ref 3.8–5.2)
RBC # FLD: 16.5 % — HIGH (ref 10.3–14.5)
SARS-COV-2 RNA SPEC QL NAA+PROBE: SIGNIFICANT CHANGE UP
SODIUM SERPL-SCNC: 139 MMOL/L — SIGNIFICANT CHANGE UP (ref 135–145)
TROPONIN T, HIGH SENSITIVITY RESULT: 34 NG/L — SIGNIFICANT CHANGE UP (ref 0–51)
TROPONIN T, HIGH SENSITIVITY RESULT: 39 NG/L — SIGNIFICANT CHANGE UP (ref 0–51)
TROPONIN T, HIGH SENSITIVITY RESULT: 50 NG/L — SIGNIFICANT CHANGE UP (ref 0–51)
TROPONIN T, HIGH SENSITIVITY RESULT: 51 NG/L — SIGNIFICANT CHANGE UP (ref 0–51)
WBC # BLD: 5.71 K/UL — SIGNIFICANT CHANGE UP (ref 3.8–10.5)
WBC # FLD AUTO: 5.71 K/UL — SIGNIFICANT CHANGE UP (ref 3.8–10.5)

## 2023-07-08 PROCEDURE — 71045 X-RAY EXAM CHEST 1 VIEW: CPT | Mod: 26

## 2023-07-08 PROCEDURE — 99285 EMERGENCY DEPT VISIT HI MDM: CPT

## 2023-07-08 RX ORDER — PANTOPRAZOLE SODIUM 20 MG/1
40 TABLET, DELAYED RELEASE ORAL
Refills: 0 | Status: DISCONTINUED | OUTPATIENT
Start: 2023-07-08 | End: 2023-07-10

## 2023-07-08 RX ORDER — SPIRONOLACTONE 25 MG/1
12.5 TABLET, FILM COATED ORAL DAILY
Refills: 0 | Status: DISCONTINUED | OUTPATIENT
Start: 2023-07-08 | End: 2023-07-13

## 2023-07-08 RX ORDER — FUROSEMIDE 40 MG
40 TABLET ORAL ONCE
Refills: 0 | Status: COMPLETED | OUTPATIENT
Start: 2023-07-08 | End: 2023-07-08

## 2023-07-08 RX ORDER — METOPROLOL TARTRATE 50 MG
12.5 TABLET ORAL DAILY
Refills: 0 | Status: DISCONTINUED | OUTPATIENT
Start: 2023-07-08 | End: 2023-07-13

## 2023-07-08 RX ORDER — EMPAGLIFLOZIN 10 MG/1
1 TABLET, FILM COATED ORAL
Refills: 0 | DISCHARGE

## 2023-07-08 RX ORDER — ACETAMINOPHEN 500 MG
650 TABLET ORAL EVERY 6 HOURS
Refills: 0 | Status: DISCONTINUED | OUTPATIENT
Start: 2023-07-08 | End: 2023-07-13

## 2023-07-08 RX ORDER — MULTIVIT-MIN/FERROUS GLUCONATE 9 MG/15 ML
1 LIQUID (ML) ORAL
Refills: 0 | DISCHARGE

## 2023-07-08 RX ORDER — MULTIVIT-MIN/FERROUS GLUCONATE 9 MG/15 ML
1 LIQUID (ML) ORAL DAILY
Refills: 0 | Status: DISCONTINUED | OUTPATIENT
Start: 2023-07-08 | End: 2023-07-11

## 2023-07-08 RX ORDER — FAMOTIDINE 10 MG/ML
1 INJECTION INTRAVENOUS
Qty: 0 | Refills: 0 | DISCHARGE

## 2023-07-08 RX ORDER — CHOLECALCIFEROL (VITAMIN D3) 125 MCG
1 CAPSULE ORAL
Refills: 0 | DISCHARGE

## 2023-07-08 RX ORDER — FUROSEMIDE 40 MG
40 TABLET ORAL DAILY
Refills: 0 | Status: DISCONTINUED | OUTPATIENT
Start: 2023-07-08 | End: 2023-07-10

## 2023-07-08 RX ORDER — AMIODARONE HYDROCHLORIDE 400 MG/1
1 TABLET ORAL
Qty: 0 | Refills: 0 | DISCHARGE

## 2023-07-08 RX ORDER — APIXABAN 2.5 MG/1
2.5 TABLET, FILM COATED ORAL EVERY 12 HOURS
Refills: 0 | Status: DISCONTINUED | OUTPATIENT
Start: 2023-07-08 | End: 2023-07-13

## 2023-07-08 RX ORDER — AMIODARONE HYDROCHLORIDE 400 MG/1
100 TABLET ORAL DAILY
Refills: 0 | Status: DISCONTINUED | OUTPATIENT
Start: 2023-07-08 | End: 2023-07-13

## 2023-07-08 RX ORDER — APIXABAN 2.5 MG/1
1 TABLET, FILM COATED ORAL
Qty: 0 | Refills: 0 | DISCHARGE

## 2023-07-08 RX ORDER — LEVOTHYROXINE SODIUM 125 MCG
125 TABLET ORAL DAILY
Refills: 0 | Status: DISCONTINUED | OUTPATIENT
Start: 2023-07-08 | End: 2023-07-13

## 2023-07-08 RX ORDER — AMIODARONE HYDROCHLORIDE 400 MG/1
0.5 TABLET ORAL
Refills: 0 | DISCHARGE

## 2023-07-08 RX ORDER — METOPROLOL TARTRATE 50 MG
0.5 TABLET ORAL
Refills: 0 | DISCHARGE

## 2023-07-08 RX ORDER — LOSARTAN POTASSIUM 100 MG/1
25 TABLET, FILM COATED ORAL DAILY
Refills: 0 | Status: DISCONTINUED | OUTPATIENT
Start: 2023-07-08 | End: 2023-07-10

## 2023-07-08 RX ORDER — SPIRONOLACTONE 25 MG/1
0.5 TABLET, FILM COATED ORAL
Refills: 0 | DISCHARGE

## 2023-07-08 RX ADMIN — Medication 650 MILLIGRAM(S): at 23:26

## 2023-07-08 RX ADMIN — Medication 40 MILLIGRAM(S): at 15:09

## 2023-07-08 RX ADMIN — APIXABAN 2.5 MILLIGRAM(S): 2.5 TABLET, FILM COATED ORAL at 20:52

## 2023-07-08 NOTE — H&P ADULT - ASSESSMENT
87 f with    Acute on Chronic Systolic Congestive Heart Failure  - telemetry  - diurese  - cardiac enzymes  - echo  - cardiology evaluation     Hypothyroid  - follow TSH  - supplement     Hypercholesterolemia  - stable    DVT prophylaxis  - AC    Further action as per clinical course     Iker Loja MD phone [phone]

## 2023-07-08 NOTE — CONSULT NOTE ADULT - ATTENDING COMMENTS
Ms. Barclay is an 87-year-old woman with HFrEF who presents with dyspnea several days after discontinuing furosemide secondary to low BP. JVP elevated on exam.    Continue O>I with close monitoring of BP, renal function, electrolytes, I/O and daily weights.    Low NaCl diet.    Please obtain most recent [facility] report from [facility].    A total of 55 minutes was spent on this patient encounter.

## 2023-07-08 NOTE — ED PROVIDER NOTE - PHYSICAL EXAMINATION
General appearance: NAD, conversant, afebrile    Eyes: anicteric sclerae, BYRON, EOMI   HENT: Atraumatic; oropharynx clear, MMM and no ulcerations, no pharyngeal erythema or exudate   Neck: Trachea midline; Full range of motion, supple   Pulm: CTA bl, normal respiratory effort and no intercostal retractions, normal work of breathing   CV: RRR, No murmurs, rubs, or gallops. 2+ peripheral pulses.   Abdomen: Soft, non-tender, non-distended; no guarding or rebound   Extremities: No peripheral edema or extremity lymphadenopathy. 5/5 strength in all four extremities.   Skin: Dry, normal temperature, turgor and texture; no rash, ulcers or subcutaneous nodules   Psych: Appropriate affect, cooperative; alert and oriented to person, place and time

## 2023-07-08 NOTE — H&P ADULT - NSHPLABSRESULTS_GEN_ALL_CORE
13.0   5.71  )-----------( 217      ( 08 Jul 2023 14:11 )             41.7       07-08    139  |  102  |  30<H>  ----------------------------<  94  4.5   |  24  |  1.21    Ca    9.0      08 Jul 2023 14:11  Mg     3.2     07-08    TPro  7.5  /  Alb  4.2  /  TBili  0.2  /  DBili  x   /  AST  16  /  ALT  13  /  AlkPhos  69  07-08              Urinalysis Basic - ( 08 Jul 2023 14:11 )    Color: x / Appearance: x / SG: x / pH: x  Gluc: 94 mg/dL / Ketone: x  / Bili: x / Urobili: x   Blood: x / Protein: x / Nitrite: x   Leuk Esterase: x / RBC: x / WBC x   Sq Epi: x / Non Sq Epi: x / Bacteria: x        PT/INR - ( 08 Jul 2023 14:11 )   PT: 14.3 sec;   INR: 1.24 ratio         PTT - ( 08 Jul 2023 14:11 )  PTT:38.1 sec    < from: Xray Chest 1 View- PORTABLE-Urgent (Xray Chest 1 View- PORTABLE-Urgent .) (07.08.23 @ 14:14) >    IMPRESSION:    No acute pulmonary disease    EKG SR NSST/T

## 2023-07-08 NOTE — ED PROVIDER NOTE - CLINICAL SUMMARY MEDICAL DECISION MAKING FREE TEXT BOX
88yo F w/ hx HFrEF (35%) 01/2023 at [facility].fib on eliquis, HTN, hypothyroid presenting with cp. Pt has midsternal constant cp associated w/ dyspnea both worse with exertion. CP at rest. No fever, abd pain, n/v. Stopped taking lasix on monday due to mild hypotension to sbp 90's. No ble edema. Exam nonfocal. c/f ACS given symptoms. possibly chf exacerbation. will get labs, cxr, reassess.

## 2023-07-08 NOTE — ED PROVIDER NOTE - OBJECTIVE STATEMENT
86yo F w/ hx HFrEF (35%) 01/2023 at [facility].fib on eliquis, HTN, hypothyroid presenting with cp. Pt has midsternal constant cp associated w/ dyspnea both worse with exertion. CP at rest. No fever, abd pain, n/v. Stopped taking lasix on monday due to mild hypotension to sbp 90's. No ble edema

## 2023-07-08 NOTE — H&P ADULT - HISTORY OF PRESENT ILLNESS
88yo F w/ hx HFrEF (35%) 01/2023 at [facility].fib on eliquis, HTN, hypothyroid presenting with cp. Pt has midsternal constant cp associated w/ dyspnea both worse with exertion. CP at rest. No fever, abd pain, n/v. Stopped taking lasix on monday due to mild hypotension to sbp 90's. No ble edema

## 2023-07-08 NOTE — ED ADULT NURSE NOTE - NSFALLHARMRISKINTERV_ED_ALL_ED
Assistance with ambulation/Communicate risk of Fall with Harm to all staff, patient, and family/Provide visual cue: red socks, yellow wristband, yellow gown, etc/Reinforce activity limits and safety measures with patient and family/Bed in lowest position, wheels locked, appropriate side rails in place/Call bell, personal items and telephone in reach/Instruct patient to call for assistance before getting out of bed/chair/stretcher/Non-slip footwear applied when patient is off stretcher/[place] to call system/Physically safe environment - no spills, clutter or unnecessary equipment/Purposeful Proactive Rounding/Room/bathroom lighting operational, light cord in reach

## 2023-07-08 NOTE — H&P ADULT - NSHPPHYSICALEXAM_GEN_ALL_CORE
PHYSICAL EXAMINATION:  Vital Signs Last 24 Hrs  T(C): 37.1 (08 Jul 2023 17:13), Max: 37.1 (08 Jul 2023 17:13)  T(F): 98.7 (08 Jul 2023 17:13), Max: 98.7 (08 Jul 2023 17:13)  HR: 72 (08 Jul 2023 17:13) (63 - 74)  BP: 100/64 (08 Jul 2023 17:13) (100/64 - 132/83)  BP(mean): --  RR: 18 (08 Jul 2023 17:13) (15 - 18)  SpO2: 98% (08 Jul 2023 17:13) (96% - 99%)    Parameters below as of 08 Jul 2023 16:49  Patient On (Oxygen Delivery Method): room air      CAPILLARY BLOOD GLUCOSE          GENERAL: NAD, well-groomed, well-developed  HEAD:  atraumatic, normocephalic  EYES: sclera anicteric  ENMT: mucous membranes moist  NECK: supple, No JVD  CHEST/LUNG: few basilar rales  to auscultation bilaterally   HEART: normal S1, S2  ABDOMEN: BS+, soft, ND, NT   EXTREMITIES:  pulses palpable; no clubbing, cyanosis, or edema b/l LEs  NEURO: awake, alert, interactive; moves all extremities  SKIN: no rashes or lesions

## 2023-07-08 NOTE — ED PROVIDER NOTE - ATTENDING CONTRIBUTION TO CARE
attending Vijay: 87yF h/o CHF (EF 35%), A fib on eliquis, PPM, HTN, hypothyroid p/w chest pain. Described as pressure, worse with exertion assoc with SOB. Stopped taking her lasix earlier this week due to low blood pressure readings. Had cath in Jan of this year at OSH, reportedly clean. Exam as above. Chest pain with high risk for ACS. Also consider CHF exacerbation in setting of discontinuing her lasix. Will obtain ekg, place on tele, labs including trop/proBNP, cxr, anticipate admission

## 2023-07-08 NOTE — ED ADULT NURSE NOTE - OBJECTIVE STATEMENT
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 Pt states "it feels like someone is sitting on my chest." Pt states the pressure is midsternal and constant. Pt also endorsing SOB. Of note, pt was advised to stop taking Lasix due to low BP. Denies HA, fever, chills, abd pain, n/v/d, urinary sx. Pt A&O x 4, ambulatory. Respirations nonlabored on RA. Abdomen soft and nontender. Skin warm, dry and intact. EKG performed in ED and pt placed on CM. Stretcher locked in lowest position, side rails up and call bell in reach.

## 2023-07-08 NOTE — ED ADULT TRIAGE NOTE - BMI (KG/M2)
17.6 Albendazole Counseling:  I discussed with the patient the risks of albendazole including but not limited to cytopenia, kidney damage, nausea/vomiting and severe allergy.  The patient understands that this medication is being used in an off-label manner.

## 2023-07-08 NOTE — H&P ADULT - NSHPSOCIALHISTORY_GEN_ALL_CORE
Social History:    Marital Status:  (   )    (   ) Single    (   )    ( x )   Occupation: retired  Lives with: ( x ) alone  (  ) children   (  ) spouse   (  ) parents  (  ) other    Substance Use (street drugs): ( x ) never used  (  ) other:  Tobacco Usage:  ( x  ) never smoked   (   ) former smoker   (   ) current smoker  (     ) pack years  (        ) last cigarette date  Alcohol Usage: no    (     ) Advanced Directives: (     ) None    (      ) DNR    (     ) DNI    (     ) Health Care Proxy:

## 2023-07-08 NOTE — PATIENT PROFILE ADULT - FALL HARM RISK - UNIVERSAL INTERVENTIONS
Bed in lowest position, wheels locked, appropriate side rails in place/Call bell, personal items and telephone in reach/Instruct patient to call for assistance before getting out of bed or chair/Non-slip footwear when patient is out of bed/[place] to call system/Physically safe environment - no spills, clutter or unnecessary equipment/Purposeful Proactive Rounding/Room/bathroom lighting operational, light cord in reach

## 2023-07-08 NOTE — ED PROVIDER NOTE - PROGRESS NOTE DETAILS
Sheri Mckeon- initial ekg with significant depressions in V1, V2. Cards consulted. posterior ekg in progress Sheri Mckeon- posterior ekg reassuring. attending Vijay: cardiology at bedside. Sheri Mckeon- pt stable. spoke to Dr. Loja for admission.

## 2023-07-08 NOTE — CONSULT NOTE ADULT - SUBJECTIVE AND OBJECTIVE BOX
CARDIOLOGY FELLOW CONSULT NOTE    Consulting service: ED  Reason for consult: CP    HPI: 86 yo F with PMH of ?NICM HFrEF (30-35%) s/p CRT-D ([facility] 3/9/23, [facility]), AFib on AC, HTN and hypothyroidism who presents with CP and SOB. Cardiology consulted for CP    Patient reports she was in her USOH until 6 days ago when she had her lasix 20 QDay stopped by her cardiologist Dr. Prince Fink for reportedly low BPs and clinical euvolemia. Over the following days the patient developed progressive SOB, chest discomfort, and BERMAN. She states 2 weeks ago she could walk 2 blocks with her aid around her apt complex however currently she cannot walk across the room beforebecoming      PMH:   H/O: hypothyroidism    Hypercholesteremia    Thyroid ca    Lupus    Meniere disease    Anemia    Bundle branch block, left    Bleeding hemorrhoid    Left ovarian cyst        PSH:   S/P thyroidectomy    History of appendectomy    History of abdominal hysterectomy    S/P breast biopsy    Cataract cortical, senile        FAMILY HISTORY:  Type 2 diabetes mellitus (Mother)        SH:      Allergies:  penicillins (Hives)  sulfa drugs (Anaphylaxis)  shellfish (Hives)  aspirin (Hives)  Mushrooms. (Nausea)      Home Meds:    Current Medications:         REVIEW OF SYSTEMS:  CONSTITUTIONAL: No weakness, fevers or chills  EYES/ENT: No visual changes;  No dysphagia  NECK: No pain or stiffness  RESPIRATORY: No cough, wheezing, hemoptysis; No shortness of breath  CARDIOVASCULAR: No chest pain or palpitations; No lower extremity edema  GASTROINTESTINAL: No abdominal or epigastric pain. No nausea, vomiting, or hematemesis; No diarrhea or constipation. No melena or hematochezia.  BACK: No back pain  GENITOURINARY: No dysuria, frequency or hematuria  NEUROLOGICAL: No numbness or weakness  SKIN: No itching, burning, rashes, or lesions   All other review of systems is negative unless indicated above.    Physical Exam:  T(F): 98.2 (07-08), Max: 98.2 (07-08)  HR: 74 (07-08) (70 - 74)  BP: 111/89 (07-08) (111/89 - 132/83)  RR: 15 (07-08)  SpO2: 97% (07-08)  GENERAL: No acute distress, well-developed  HEAD:  Atraumatic, Normocephalic  ENT: EOMI, PERRLA, conjunctiva and sclera clear, Neck supple, No JVD, moist mucosa  CHEST/LUNG: Clear to auscultation bilaterally; No wheeze, equal breath sounds bilaterally   BACK: No spinal tenderness  HEART: Regular rate and rhythm; No murmurs, rubs, or gallops  ABDOMEN: Soft, Nontender, Nondistended; Bowel sounds present  EXTREMITIES:  No clubbing, cyanosis, or edema  PSYCH: Nl behavior, nl affect  NEUROLOGY: AAOx3, non-focal, cranial nerves intact  SKIN: Normal color, No rashes or lesions  LINES:    ECG: Personally reviewed    Echo: Personally reviewed    Stress Testing: Personally reviewed    Cath: Personally reviewed    CXR: Personally reviewed    Labs: Personally reviewed                        13.0   5.71  )-----------( 217      ( 08 Jul 2023 14:11 )             41.7     07-08    139  |  102  |  30<H>  ----------------------------<  94  4.5   |  24  |  1.21    Ca    9.0      08 Jul 2023 14:11  Mg     3.2     07-08    TPro  7.5  /  Alb  4.2  /  TBili  0.2  /  DBili  x   /  AST  16  /  ALT  13  /  AlkPhos  69  07-08    PT/INR - ( 08 Jul 2023 14:11 )   PT: 14.3 sec;   INR: 1.24 ratio         PTT - ( 08 Jul 2023 14:11 )  PTT:38.1 sec    CARDIAC MARKERS ( 08 Jul 2023 15:23 )  39 ng/L / x     / x     / x     / x     / x      CARDIAC MARKERS ( 08 Jul 2023 14:11 )  34 ng/L / x     / x     / x     / x     / x                      Signed by:  Flavio Rose PGY5  Cardiology Fellow   CARDIOLOGY FELLOW CONSULT NOTE    Consulting service: ED  Reason for consult: CP    HPI: 88 yo F with PMH of ?NICM HFrEF (30-35%) s/p CRT-D ([facility] 3/9/23, [facility]), AFib on AC, HTN and hypothyroidism who presents with CP and SOB. Cardiology consulted for CP    Patient reports she was in her USOH until 6 days ago when she had her lasix 20 QDay stopped by her cardiologist Dr. Prince Fink ([phone]) for reportedly low BPs and clinical euvolemia. Over the following days the patient developed progressive SOB, chest discomfort, and BERMAN. She states 2 weeks ago she could walk 2 blocks with her aid around her apt complex however currently she cannot walk across the room before becoming SOB. She has also noticed new onset chest discomfort for the last week, described as a heaviness that is generalized across the center of her chest, non exertional, non radiating, occasionally associated with SOB, occurring sporadically. She has not had any exertional CP at baseline and feels very similar to prior HF exacerbations. Pt also reports she was cathed at [place] in 1/2023 with no e/o CAD. She does not check her BP at home.    EKG AS-CRT-D paced    Meds: MTP Succ 25 QDay, losartan 25 QDay, Tony 25 QDay, Jardiance 10 QDay, Lasix 20 QDay (stopped), apixaban 2.5 BID, Amio 200 QDay, Cholecal 2000 IU, Pantoprazole 40 QDay, synthroid 112 QDay      PMH:   H/O: hypothyroidism  Hypercholesteremia  Thyroid ca  Lupus  Meniere disease  Anemia  Bundle branch block, left  Bleeding hemorrhoid  Left ovarian cyst    PSH:   S/P thyroidectomy  History of appendectomy  History of abdominal hysterectomy  S/P breast biopsy  Cataract cortical, senile    FAMILY HISTORY:  Type 2 diabetes mellitus (Mother)      Allergies:  penicillins (Hives)  sulfa drugs (Anaphylaxis)  shellfish (Hives)  aspirin (Hives)  Mushrooms. (Nausea)    Home Meds:    Current Medications:      REVIEW OF SYSTEMS: All other review of systems is negative unless indicated above.    Physical Exam:  T(F): 98.2 (07-08), Max: 98.2 (07-08)  HR: 74 (07-08) (70 - 74)  BP: 111/89 (07-08) (111/89 - 132/83)  RR: 15 (07-08)  SpO2: 97% (07-08)  GENERAL: No acute distress, well-developed  HEAD:  Atraumatic, Normocephalic  ENT: EOMI, PERRLA, conjunctiva and sclera clear, Neck supple, moist mucosa  CHEST/LUNG: Clear to auscultation bilaterally; No wheeze, equal breath sounds bilaterally   HEART: Regular rate and rhythm; II/VI holosystolic murmur, no g/r, JVD to 12 cm   ABDOMEN: Soft, Nontender, Nondistended; Bowel sounds present  EXTREMITIES:  No clubbing, cyanosis, or edema  PSYCH: Nl behavior, nl affect  NEUROLOGY: AAOx3, non-focal, cranial nerves intact  SKIN: Normal color, No rashes or lesions      ECG: AS-CRT-D paced    Echo  ------------------------------------------------------------------------  Dimensions:    Normal Values:  LA:     3.2    2.0 - 4.0 cm  Ao:     3.2    2.0 - 3.8 cm  SEPTUM: 0.9    0.6 - 1.2 cm  PWT:   0.9    0.6 - 1.1 cm  LVIDd:  4.3    3.0 - 5.6 cm  LVIDs:  3.2    1.8 - 4.0 cm  Derived variables:  LVMI: 91 g/m2  RWT: 0.41  Fractional short: 26 %  EF (Little Rule): 35 %Doppler Peak Velocity (m/sec):  AoV=1.0  ------------------------------------------------------------------------  Observations:  Mitral Valve: Mitral annular calcification. Tethered mitral  valve leaflets with normal opening. Moderate mitral  regurgitation.  Aortic Valve/Aorta: Normal trileaflet aortic valve. Minimal  aortic regurgitation.  Aortic Root: 3.2 cm.  Ascending Aorta: 2.9 cm.  LVOT diameter: 2.3 cm.  Left Atrium: Mildly dilated left atrium.  LA volume index =  39 cc/m2.  Left Ventricle: Moderate global left ventricular systolic  dysfunction. LVEF calculated using biplane Little's method  is 35%. Septal motion consistent with conduction defect.  Normal left ventricular internal dimensions and wall  thicknesses. Unable to determine diastolic function due to  merged E and A wave.  Right Heart: Normal right atrium. Normal right ventricular  size and function. Normal tricuspid valve. Minimal  tricuspid regurgitation. Normal pulmonic valve. Minimal  pulmonic regurgitation.  Pericardium/Pleura: Small loculated pericardial effusion  mostly around the right atrium.  Hemodynamic: IVC is normal. Unable to estimate RVSP.  ------------------------------------------------------------------------  Conclusions:  1. Mitral annular calcification. Tethered mitral valve  leaflets with normal opening. Moderate mitral  regurgitation.  2. Normal trileaflet aortic valve. Minimal aortic  regurgitation.  3. Mildly dilated left atrium.  LA volume index = 39 cc/m2.  Normal left ventricular internal dimensions and wall  thicknesses.Moderate global left ventricular systolic  dysfunction. LVEF calculated using biplane Little's method  is 35%. Septal motion consistent with conduction defect.  Unable to determine diastolic function due to merged E and  A wave.  4. Normal right atrium. Normal right ventricular size and  function.  5. Normal tricuspid valve. Minimal tricuspid  regurgitation.Unable to estimate RVSP.  10. Small loculated pericardial effusion mostly around the  right atrium.  *** No previous Echo exam.  ------------------------------------------------------------------------  Confirmed on  2/23/2023 - 15:55:34 by Olivia Dao M.D.  ------------------------------------------------------------------------    Labs: Personally reviewed                        13.0   5.71  )-----------( 217      ( 08 Jul 2023 14:11 )             41.7     07-08    139  |  102  |  30<H>  ----------------------------<  94  4.5   |  24  |  1.21    Ca    9.0      08 Jul 2023 14:11  Mg     3.2     07-08    TPro  7.5  /  Alb  4.2  /  TBili  0.2  /  DBili  x   /  AST  16  /  ALT  13  /  AlkPhos  69  07-08    PT/INR - ( 08 Jul 2023 14:11 )   PT: 14.3 sec;   INR: 1.24 ratio    PTT - ( 08 Jul 2023 14:11 )  PTT:38.1 sec    CARDIAC MARKERS ( 08 Jul 2023 15:23 )  39 ng/L / x     / x     / x     / x     / x      CARDIAC MARKERS ( 08 Jul 2023 14:11 )  34 ng/L / x     / x     / x     / x     / x          A/P  88 yo F with PMH of ?NICM HFrEF (30-35%) s/p CRT-D ([facility] 3/9/23, [facility]), AFib on AC, HTN and hypothyroidism who presents with CP and SOB. Cardiology consulted for CP    #ADHF  #HFrEF   - Etiology: NICM? unclear  - Last TTE: EF 30-35% mod MR LAE   - Cath: None on file   - NYHA: III  - ACC: C  - SCAI: B  - GDMT:    > BB: MTP Succ 25 QDay    > ACE/ARB/ARNI: Losartan 25 QDay    > MRA: Spironolactone 25 QDay    > SGLT2 I: Jardiance 10 QDay  - Diuretics: Lasix 20 (not taking)  - Volume Status: Hypervolemic (JVD)  Plan:  - Cont home GDMT (+/- jardiance if worried about hypoglycemia in the hospital)  - Lasix 40 IV BID   > Goal neg 1-2L per 24 hours   - F/U TTE  - F/U proBNP  - Obtain records from primary cardiologist    #AFib  #Hx of CRT-D  - Pt with long standing ?chronic AFib  - Home regimen: MTP Succ 25 QDay, Apixaban 2.5 QDay  - CHADSVASC 5  - Currently LB paced from CRT-D  Plan:  - Cont home regimen    #CP  #Elevated Troponin  - Trop 39 - 34  - EKG paced  - Likely related to HF, low c/f ACS based on story  Plan:  - Can repeat 1 more trop to make sure still not uptrending   - Low c/f ACS     Signed by:  Flavio Rose PGY5  Cardiology Fellow    Sridhar Delatorre  CARDIOLOGY FELLOW CONSULT NOTE    Consulting service: ED  Reason for consult: CP    HPI: 86 yo F with PMH of ?NICM HFrEF (30-35%) s/p CRT-D ([facility] 3/9/23, [facility] [place]), AFib on AC, HTN and hypothyroidism who presents with CP and SOB. Cardiology consulted for CP    Patient reports she was in her USOH until 6 days ago when she had her lasix 20 QDay stopped by her cardiologist Dr. Prince Fink ([phone]) for reportedly low BPs and clinical euvolemia. Over the following days the patient developed progressive SOB, chest discomfort, and BERMAN. She states 2 weeks ago she could walk 2 blocks with her aid around her apt complex however currently she cannot walk across the room before becoming SOB. She has also noticed new onset chest discomfort for the last week, described as a heaviness that is generalized across the center of her chest, non exertional, non radiating, occasionally associated with SOB, occurring sporadically. She has not had any exertional CP at baseline and feels very similar to prior HF exacerbations. Pt also reports she was cathed at [place] in 1/2023 with no e/o CAD. She does not check her BP at home.    EKG AS-CRT-D paced    Meds: MTP Succ 25 QDay, losartan 25 QDay, Tony 25 QDay, Jardiance 10 QDay, Lasix 20 QDay (stopped), apixaban 2.5 BID, Amio 200 QDay, Cholecal 2000 IU, Pantoprazole 40 QDay, synthroid 112 QDay      PMH:   H/O: hypothyroidism  Hypercholesteremia  Thyroid ca  Lupus  Meniere disease  Anemia  Bundle branch block, left  Bleeding hemorrhoid  Left ovarian cyst    PSH:   S/P thyroidectomy  History of appendectomy  History of abdominal hysterectomy  S/P breast biopsy  Cataract cortical, senile    FAMILY HISTORY:  Type 2 diabetes mellitus (Mother)      Allergies:  penicillins (Hives)  sulfa drugs (Anaphylaxis)  shellfish (Hives)  aspirin (Hives)  Mushrooms. (Nausea)    REVIEW OF SYSTEMS: All other review of systems is negative unless indicated above.    Physical Exam:  T(F): 98.2 (07-08), Max: 98.2 (07-08)  HR: 74 (07-08) (70 - 74)  BP: 111/89 (07-08) (111/89 - 132/83)  RR: 15 (07-08)  SpO2: 97% (07-08)  GENERAL: No acute distress, well-developed  HEAD:  Atraumatic, Normocephalic  ENT: EOMI, PERRLA, conjunctiva and sclera clear, Neck supple, moist mucosa  CHEST/LUNG: Clear to auscultation bilaterally; No wheeze, equal breath sounds bilaterally   HEART: Regular rate and rhythm; II/VI holosystolic murmur, no g/r, JVD to 12 cm   ABDOMEN: Soft, Nontender, Nondistended; Bowel sounds present  EXTREMITIES:  No clubbing, cyanosis, or edema  PSYCH: Nl behavior, nl affect  NEUROLOGY: AAOx3, non-focal, cranial nerves intact  SKIN: Normal color, No rashes or lesions      ECG: AS-CRT-D paced    Echo  ------------------------------------------------------------------------  Dimensions:    Normal Values:  LA:     3.2    2.0 - 4.0 cm  Ao:     3.2    2.0 - 3.8 cm  SEPTUM: 0.9    0.6 - 1.2 cm  PWT:   0.9    0.6 - 1.1 cm  LVIDd:  4.3    3.0 - 5.6 cm  LVIDs:  3.2    1.8 - 4.0 cm  Derived variables:  LVMI: 91 g/m2  RWT: 0.41  Fractional short: 26 %  EF (Little Rule): 35 %Doppler Peak Velocity (m/sec):  AoV=1.0  ------------------------------------------------------------------------  Observations:  Mitral Valve: Mitral annular calcification. Tethered mitral  valve leaflets with normal opening. Moderate mitral  regurgitation.  Aortic Valve/Aorta: Normal trileaflet aortic valve. Minimal  aortic regurgitation.  Aortic Root: 3.2 cm.  Ascending Aorta: 2.9 cm.  LVOT diameter: 2.3 cm.  Left Atrium: Mildly dilated left atrium.  LA volume index =  39 cc/m2.  Left Ventricle: Moderate global left ventricular systolic  dysfunction. LVEF calculated using biplane Little's method  is 35%. Septal motion consistent with conduction defect.  Normal left ventricular internal dimensions and wall  thicknesses. Unable to determine diastolic function due to  merged E and A wave.  Right Heart: Normal right atrium. Normal right ventricular  size and function. Normal tricuspid valve. Minimal  tricuspid regurgitation. Normal pulmonic valve. Minimal  pulmonic regurgitation.  Pericardium/Pleura: Small loculated pericardial effusion  mostly around the right atrium.  Hemodynamic: IVC is normal. Unable to estimate RVSP.  ------------------------------------------------------------------------  Conclusions:  1. Mitral annular calcification. Tethered mitral valve  leaflets with normal opening. Moderate mitral  regurgitation.  2. Normal trileaflet aortic valve. Minimal aortic  regurgitation.  3. Mildly dilated left atrium.  LA volume index = 39 cc/m2.  Normal left ventricular internal dimensions and wall  thicknesses. Moderate global left ventricular systolic  dysfunction. LVEF calculated using biplane Little's method  is 35%. Septal motion consistent with conduction defect.  Unable to determine diastolic function due to merged E and  A wave.  4. Normal right atrium. Normal right ventricular size and  function.  5. Normal tricuspid valve. Minimal tricuspid  regurgitation.Unable to estimate RVSP.  10. Small loculated pericardial effusion mostly around the  right atrium.  *** No previous Echo exam.  ------------------------------------------------------------------------  Confirmed on  2/23/2023 - 15:55:34 by Olivia Dao M.D.  ------------------------------------------------------------------------    Labs: Personally reviewed                        13.0   5.71  )-----------( 217      ( 08 Jul 2023 14:11 )             41.7     07-08    139  |  102  |  30<H>  ----------------------------<  94  4.5   |  24  |  1.21    Ca    9.0      08 Jul 2023 14:11  Mg     3.2     07-08    TPro  7.5  /  Alb  4.2  /  TBili  0.2  /  DBili  x   /  AST  16  /  ALT  13  /  AlkPhos  69  07-08    PT/INR - ( 08 Jul 2023 14:11 )   PT: 14.3 sec;   INR: 1.24 ratio    PTT - ( 08 Jul 2023 14:11 )  PTT:38.1 sec    CARDIAC MARKERS ( 08 Jul 2023 15:23 )  39 ng/L / x     / x     / x     / x     / x      CARDIAC MARKERS ( 08 Jul 2023 14:11 )  34 ng/L / x     / x     / x     / x     / x        MEDICATIONS  (STANDING):  aMIOdarone    Tablet 100 milliGRAM(s) Oral daily  apixaban 2.5 milliGRAM(s) Oral every 12 hours  furosemide   Injectable 40 milliGRAM(s) IV Push daily  levothyroxine 125 MICROGram(s) Oral daily  losartan 25 milliGRAM(s) Oral daily  metoprolol succinate ER 12.5 milliGRAM(s) Oral daily  multivitamin/minerals 1 Tablet(s) Oral daily  pantoprazole    Tablet 40 milliGRAM(s) Oral before breakfast  spironolactone 12.5 milliGRAM(s) Oral daily      A/P  86 yo F with PMH of ?NICM HFrEF (30-35%) s/p CRT-D ([facility] 3/9/23, [facility] [place]), AFib on AC, HTN and hypothyroidism who presents with CP and SOB. Cardiology consulted for CP    #ADHF  #HFrEF   - Etiology: NICM? unclear  - Last TTE: EF 30-35% mod MR LAE   - Cath: None on file   - NYHA: III  - ACC: C  - SCAI: B  - GDMT:    > BB: MTP Succ 25 QDay    > ACE/ARB/ARNI: Losartan 25 QDay    > MRA: Spironolactone 25 QDay    > SGLT2 I: Jardiance 10 QDay  - Diuretics: Lasix 20 (not taking)  - Volume Status: Hypervolemic (JVD)  Plan:  - Cont home GDMT (+/- jardiance if worried about hypoglycemia in the hospital)  - Lasix 40 IV BID   > Goal neg 1-2L per 24 hours   - F/U TTE  - F/U proBNP  - Obtain records from primary cardiologist    #AFib  #Hx of CRT-D  - Pt with long standing ?chronic AFib  - Home regimen: MTP Succ 25 QDay, Apixaban 2.5 QDay  - CHADSVASC 5  - Currently LB paced from CRT-D  Plan:  - Cont home regimen    #CP  #Elevated Troponin  - Trop 39 - 34  - EKG paced  - Likely related to HF, low c/f ACS based on story  Plan:  - Can repeat 1 more trop to make sure still not uptrending   - Low c/f ACS     Signed by:  Flavio Rose PGY5  Cardiology Fellow    Sridhar Delatorre

## 2023-07-08 NOTE — H&P ADULT - NSHPREVIEWOFSYSTEMS_GEN_ALL_CORE
REVIEW OF SYSTEMS:    CONSTITUTIONAL: + weakness, fevers or chills  EYES/ENT: No visual changes;  No vertigo or throat pain   NECK: No pain or stiffness  RESPIRATORY: No cough, wheezing, hemoptysis; + shortness of breath  CARDIOVASCULAR: No chest pain or palpitations + chest tightness  GASTROINTESTINAL: No abdominal or epigastric pain. No nausea, vomiting, or hematemesis; No diarrhea or constipation. No melena or hematochezia.  GENITOURINARY: No dysuria, frequency or hematuria  NEUROLOGICAL: No numbness or weakness  SKIN: No itching, burning, rashes, or lesions   All other review of systems is negative unless indicated above. bite, animal

## 2023-07-09 LAB
ANION GAP SERPL CALC-SCNC: 17 MMOL/L — SIGNIFICANT CHANGE UP (ref 5–17)
BUN SERPL-MCNC: 37 MG/DL — HIGH (ref 7–23)
CALCIUM SERPL-MCNC: 9.5 MG/DL — SIGNIFICANT CHANGE UP (ref 8.4–10.5)
CHLORIDE SERPL-SCNC: 97 MMOL/L — SIGNIFICANT CHANGE UP (ref 96–108)
CO2 SERPL-SCNC: 23 MMOL/L — SIGNIFICANT CHANGE UP (ref 22–31)
CREAT SERPL-MCNC: 1.54 MG/DL — HIGH (ref 0.5–1.3)
EGFR: 33 ML/MIN/1.73M2 — LOW
GLUCOSE SERPL-MCNC: 87 MG/DL — SIGNIFICANT CHANGE UP (ref 70–99)
NT-PROBNP SERPL-SCNC: 3006 PG/ML — HIGH (ref 0–300)
POTASSIUM SERPL-MCNC: 4 MMOL/L — SIGNIFICANT CHANGE UP (ref 3.5–5.3)
POTASSIUM SERPL-SCNC: 4 MMOL/L — SIGNIFICANT CHANGE UP (ref 3.5–5.3)
SODIUM SERPL-SCNC: 137 MMOL/L — SIGNIFICANT CHANGE UP (ref 135–145)
TROPONIN T, HIGH SENSITIVITY RESULT: 51 NG/L — SIGNIFICANT CHANGE UP (ref 0–51)
TSH SERPL-MCNC: 3.32 UIU/ML — SIGNIFICANT CHANGE UP (ref 0.27–4.2)

## 2023-07-09 PROCEDURE — 99222 1ST HOSP IP/OBS MODERATE 55: CPT | Mod: GC

## 2023-07-09 RX ADMIN — Medication 30 MILLILITER(S): at 11:08

## 2023-07-09 RX ADMIN — SPIRONOLACTONE 12.5 MILLIGRAM(S): 25 TABLET, FILM COATED ORAL at 07:56

## 2023-07-09 RX ADMIN — Medication 40 MILLIGRAM(S): at 05:03

## 2023-07-09 RX ADMIN — Medication 1 TABLET(S): at 11:08

## 2023-07-09 RX ADMIN — APIXABAN 2.5 MILLIGRAM(S): 2.5 TABLET, FILM COATED ORAL at 17:38

## 2023-07-09 RX ADMIN — Medication 650 MILLIGRAM(S): at 00:28

## 2023-07-09 RX ADMIN — AMIODARONE HYDROCHLORIDE 100 MILLIGRAM(S): 400 TABLET ORAL at 05:02

## 2023-07-09 RX ADMIN — PANTOPRAZOLE SODIUM 40 MILLIGRAM(S): 20 TABLET, DELAYED RELEASE ORAL at 05:02

## 2023-07-09 RX ADMIN — LOSARTAN POTASSIUM 25 MILLIGRAM(S): 100 TABLET, FILM COATED ORAL at 07:56

## 2023-07-09 RX ADMIN — APIXABAN 2.5 MILLIGRAM(S): 2.5 TABLET, FILM COATED ORAL at 05:02

## 2023-07-09 RX ADMIN — Medication 125 MICROGRAM(S): at 05:02

## 2023-07-09 RX ADMIN — Medication 30 MILLILITER(S): at 17:39

## 2023-07-09 RX ADMIN — Medication 12.5 MILLIGRAM(S): at 07:56

## 2023-07-10 LAB
ANION GAP SERPL CALC-SCNC: 11 MMOL/L — SIGNIFICANT CHANGE UP (ref 5–17)
BUN SERPL-MCNC: 43 MG/DL — HIGH (ref 7–23)
CALCIUM SERPL-MCNC: 9.3 MG/DL — SIGNIFICANT CHANGE UP (ref 8.4–10.5)
CHLORIDE SERPL-SCNC: 98 MMOL/L — SIGNIFICANT CHANGE UP (ref 96–108)
CO2 SERPL-SCNC: 28 MMOL/L — SIGNIFICANT CHANGE UP (ref 22–31)
CREAT SERPL-MCNC: 1.83 MG/DL — HIGH (ref 0.5–1.3)
EGFR: 27 ML/MIN/1.73M2 — LOW
GLUCOSE SERPL-MCNC: 81 MG/DL — SIGNIFICANT CHANGE UP (ref 70–99)
HCT VFR BLD CALC: 40.3 % — SIGNIFICANT CHANGE UP (ref 34.5–45)
HGB BLD-MCNC: 12.9 G/DL — SIGNIFICANT CHANGE UP (ref 11.5–15.5)
MCHC RBC-ENTMCNC: 27.5 PG — SIGNIFICANT CHANGE UP (ref 27–34)
MCHC RBC-ENTMCNC: 32 GM/DL — SIGNIFICANT CHANGE UP (ref 32–36)
MCV RBC AUTO: 85.9 FL — SIGNIFICANT CHANGE UP (ref 80–100)
NRBC # BLD: 0 /100 WBCS — SIGNIFICANT CHANGE UP (ref 0–0)
PLATELET # BLD AUTO: 211 K/UL — SIGNIFICANT CHANGE UP (ref 150–400)
POTASSIUM SERPL-MCNC: 4.8 MMOL/L — SIGNIFICANT CHANGE UP (ref 3.5–5.3)
POTASSIUM SERPL-SCNC: 4.8 MMOL/L — SIGNIFICANT CHANGE UP (ref 3.5–5.3)
RBC # BLD: 4.69 M/UL — SIGNIFICANT CHANGE UP (ref 3.8–5.2)
RBC # FLD: 16.6 % — HIGH (ref 10.3–14.5)
SODIUM SERPL-SCNC: 137 MMOL/L — SIGNIFICANT CHANGE UP (ref 135–145)
WBC # BLD: 5.54 K/UL — SIGNIFICANT CHANGE UP (ref 3.8–10.5)
WBC # FLD AUTO: 5.54 K/UL — SIGNIFICANT CHANGE UP (ref 3.8–10.5)

## 2023-07-10 PROCEDURE — 99233 SBSQ HOSP IP/OBS HIGH 50: CPT | Mod: GC

## 2023-07-10 PROCEDURE — 93306 TTE W/DOPPLER COMPLETE: CPT | Mod: 26

## 2023-07-10 RX ORDER — PANTOPRAZOLE SODIUM 20 MG/1
40 TABLET, DELAYED RELEASE ORAL
Refills: 0 | Status: DISCONTINUED | OUTPATIENT
Start: 2023-07-10 | End: 2023-07-13

## 2023-07-10 RX ADMIN — Medication 650 MILLIGRAM(S): at 22:03

## 2023-07-10 RX ADMIN — APIXABAN 2.5 MILLIGRAM(S): 2.5 TABLET, FILM COATED ORAL at 17:23

## 2023-07-10 RX ADMIN — Medication 40 MILLIGRAM(S): at 05:25

## 2023-07-10 RX ADMIN — PANTOPRAZOLE SODIUM 40 MILLIGRAM(S): 20 TABLET, DELAYED RELEASE ORAL at 05:25

## 2023-07-10 RX ADMIN — AMIODARONE HYDROCHLORIDE 100 MILLIGRAM(S): 400 TABLET ORAL at 05:25

## 2023-07-10 RX ADMIN — Medication 125 MICROGRAM(S): at 05:25

## 2023-07-10 RX ADMIN — Medication 1 TABLET(S): at 17:23

## 2023-07-10 RX ADMIN — APIXABAN 2.5 MILLIGRAM(S): 2.5 TABLET, FILM COATED ORAL at 05:25

## 2023-07-10 RX ADMIN — Medication 30 MILLILITER(S): at 19:38

## 2023-07-10 RX ADMIN — Medication 12.5 MILLIGRAM(S): at 07:59

## 2023-07-10 RX ADMIN — LOSARTAN POTASSIUM 25 MILLIGRAM(S): 100 TABLET, FILM COATED ORAL at 07:59

## 2023-07-10 RX ADMIN — Medication 650 MILLIGRAM(S): at 21:23

## 2023-07-10 RX ADMIN — PANTOPRAZOLE SODIUM 40 MILLIGRAM(S): 20 TABLET, DELAYED RELEASE ORAL at 21:22

## 2023-07-10 RX ADMIN — SPIRONOLACTONE 12.5 MILLIGRAM(S): 25 TABLET, FILM COATED ORAL at 07:59

## 2023-07-10 NOTE — CONSULT NOTE ADULT - SUBJECTIVE AND OBJECTIVE BOX
HPI:  88yo F w/ hx HFrEF (35%) 01/2023 at [facility].fib on eliquis, HTN, hypothyroid presenting with cp. Pt has midsternal constant cp associated w/ dyspnea both worse with exertion. CP at rest. No fever, abd pain, n/v. Stopped taking lasix on monday due to mild hypotension to sbp 90's. No ble edema (08 Jul 2023 18:31)  Patient states that she has had numerous prior hospitalizations for ASHF shortly after discontinuation of lasix.     Patient seen and examined at bedside. Notes improving SOB and no episodes of CP since admission. TTE today.     Overnight Events: NAEO    Telemetry: NSR     Current Meds:  acetaminophen     Tablet .. 650 milliGRAM(s) Oral every 6 hours PRN  aluminum hydroxide/magnesium hydroxide/simethicone Suspension 30 milliLiter(s) Oral every 4 hours PRN  aMIOdarone    Tablet 100 milliGRAM(s) Oral daily  apixaban 2.5 milliGRAM(s) Oral every 12 hours  furosemide   Injectable 40 milliGRAM(s) IV Push daily  levothyroxine 125 MICROGram(s) Oral daily  losartan 25 milliGRAM(s) Oral daily  metoprolol succinate ER 12.5 milliGRAM(s) Oral daily  multivitamin/minerals 1 Tablet(s) Oral daily  pantoprazole    Tablet 40 milliGRAM(s) Oral before breakfast  spironolactone 12.5 milliGRAM(s) Oral daily      Vitals:  T(F): 97.6 (07-10), Max: 97.8 (07-10)  HR: 64 (07-10) (59 - 66)  BP: 93/60 (07-10) (93/56 - 110/70)  RR: 18 (07-10)  SpO2: 95% (07-10)  I&O's Summary    09 Jul 2023 07:01  -  10 Jul 2023 07:00  --------------------------------------------------------  IN: 720 mL / OUT: 550 mL / NET: 170 mL    10 Jul 2023 07:01  -  10 Jul 2023 12:53  --------------------------------------------------------  IN: 120 mL / OUT: 800 mL / NET: -680 mL        Physical Exam:  Appearance: No acute distress; well appearing, elderly   Eyes: PERRL, EOMI, pink conjunctiva  HEENT: Normal oral mucosa  Cardiovascular: RRR, S1, S2, grade II/IV holosystolic murmur, rubs, or gallops; no edema; +JVD   Respiratory: Clear to auscultation bilaterally  Gastrointestinal: soft, non-tender, non-distended with normal bowel sounds  Musculoskeletal: No clubbing; no joint deformity   Neurologic: Non-focal  Lymphatic: No lymphadenopathy  Psychiatry: AAOx3, mood & affect appropriate  Skin: No rashes, ecchymoses, or cyanosis                          12.9   5.54  )-----------( 211      ( 10 Jul 2023 06:22 )             40.3     07-10    137  |  98  |  43<H>  ----------------------------<  81  4.8   |  28  |  1.83<H>    Ca    9.3      10 Jul 2023 06:22  Mg     3.2     07-08    TPro  7.5  /  Alb  4.2  /  TBili  0.2  /  DBili  x   /  AST  16  /  ALT  13  /  AlkPhos  69  07-08    PT/INR - ( 08 Jul 2023 14:11 )   PT: 14.3 sec;   INR: 1.24 ratio         PTT - ( 08 Jul 2023 14:11 )  PTT:38.1 sec  CARDIAC MARKERS ( 09 Jul 2023 02:25 )  51 ng/L / x     / x     / x     / x     / x      CARDIAC MARKERS ( 08 Jul 2023 20:18 )  51 ng/L / x     / x     / 50 U/L / x     / 3.1 ng/mL  CARDIAC MARKERS ( 08 Jul 2023 15:23 )  39 ng/L / x     / x     / x     / x     / x      CARDIAC MARKERS ( 08 Jul 2023 14:11 )  34 ng/L / x     / x     / x     / x     / x          7/8 EKG AS-CRT-D paced    2/23 TTE EF 35%, moderate MR, moderate global LV systolic dysfunction, septal motion c/w conduction defect    A/P      Emilie Holman PGY1    DWA HPI:  88yo F w/ hx HFrEF (35%) 01/2023 at [facility].fib on Eliquis, HTN, hypothyroid.  Presented with mid-sternal constant cp associated w/ dyspnea, worse with exertion but also with CP at rest. No fever, abd pain, n/v.   Stopped taking Lasix on Monday due to mild hypotension to sbp 90's. No BLE edema (08 Jul 2023 18:31)  Patient states that she has had numerous prior hospitalizations for ADHF shortly after discontinuation of Lasix in past.    Patient seen and examined at bedside. Notes improving SOB and no episodes of CP since admission. On scheduled for TTE today.     Overnight Events: NAEO    Telemetry: NSR     Current Meds:  acetaminophen     Tablet .. 650 milliGRAM(s) Oral every 6 hours PRN  aluminum hydroxide/magnesium hydroxide/simethicone Suspension 30 milliLiter(s) Oral every 4 hours PRN  aMIOdarone    Tablet 100 milliGRAM(s) Oral daily  apixaban 2.5 milliGRAM(s) Oral every 12 hours  furosemide   Injectable 40 milliGRAM(s) IV Push daily  levothyroxine 125 MICROGram(s) Oral daily  losartan 25 milliGRAM(s) Oral daily  metoprolol succinate ER 12.5 milliGRAM(s) Oral daily  multivitamin/minerals 1 Tablet(s) Oral daily  pantoprazole    Tablet 40 milliGRAM(s) Oral before breakfast  spironolactone 12.5 milliGRAM(s) Oral daily      Vitals:  T(F): 97.6 (07-10), Max: 97.8 (07-10)  HR: 64 (07-10) (59 - 66)  BP: 93/60 (07-10) (93/56 - 110/70)  RR: 18 (07-10)  SpO2: 95% (07-10)      I&O's Summary  09 Jul 2023 07:01  -  10 Jul 2023 07:00  --------------------------------------------------------  IN: 720 mL / OUT: 550 mL / NET: 170 mL    10 Jul 2023 07:01  -  10 Jul 2023 12:53  --------------------------------------------------------  IN: 120 mL / OUT: 800 mL / NET: -680 mL        Physical Exam:  Appearance: No acute distress; well appearing, elderly   Eyes: PERRL, EOMI, pink conjunctiva  HEENT: Normal oral mucosa  Cardiovascular: RRR, S1, S2, grade II/IV holosystolic murmur, rubs, or gallops; no edema; +JVD   Respiratory: Clear to auscultation bilaterally  Gastrointestinal: soft, non-tender, non-distended with normal bowel sounds  Musculoskeletal: No clubbing; no joint deformity   Neurologic: Non-focal  Lymphatic: No lymphadenopathy  Psychiatry: AAOx3, mood & affect appropriate  Skin: No rashes, ecchymoses, or cyanosis               LABS:             12.9   5.54  )-----------( 211      ( 10 Jul 2023 06:22 )             40.3     07-10  137  |  98  |  43<H>  ----------------------------<  81  4.8   |  28  |  1.83<H>    Ca    9.3      10 Jul 2023 06:22  Mg     3.2     07-08    TPro  7.5  /  Alb  4.2  /  TBili  0.2  /  DBili  x   /  AST  16  /  ALT  13  /  AlkPhos  69  07-08    PT/INR - ( 08 Jul 2023 14:11 )   PT: 14.3 sec;   INR: 1.24 ratio    PTT - ( 08 Jul 2023 14:11 )  PTT:38.1 sec    CARDIAC MARKERS ( 09 Jul 2023 02:25 )  51 ng/L / x     / x     / x     / x     / x      CARDIAC MARKERS ( 08 Jul 2023 20:18 )  51 ng/L / x     / x     / 50 U/L / x     / 3.1 ng/mL  CARDIAC MARKERS ( 08 Jul 2023 15:23 )  39 ng/L / x     / x     / x     / x     / x      CARDIAC MARKERS ( 08 Jul 2023 14:11 )  34 ng/L / x     / x     / x     / x     / x        2/23 TTE EF 35%, moderate MR, moderate global LV systolic dysfunction, septal motion c/w conduction defect

## 2023-07-10 NOTE — CONSULT NOTE ADULT - ASSESSMENT
A/P  86 yo F with PMH of ?NICM HFrEF (30-35%) s/p CRT-D ([facility] 3/9/23, [place] [facility]), AFib on AC, HTN and hypothyroidism who presents with CP and SOB. Cardiology consulted for CP.     #ADHF  #HFrEF   - Etiology unclear  - Last TTE 2/23 : EF 30-35% mod MR LAE   - NYHA: III  - ACC: C  - SCAI: B  - GDMT:    > BB: MTP Succ 25 QDay    > ACE/ARB/ARNI: Losartan 25 QDay (hold iso JOSE LUIS)    > MRA: Spironolactone 25 QDay    > SGLT2 I: Jardiance 10 QDay  - Diuretics: Lasix 20 (not taking)  - Volume Status: Hypervolemic (JVD)  - Pro BNP 3619 (7/8)/ 12435 (7/9)  Plan:  Cont home GDMT (+/- jardiance if worried about hypoglycemia in the hospital) with exceptions as noted below:   - hold losartan iso JOSE LUIS  - c/w MTP Succ 25 QDay  - c/w Spironolactone 25 QDay  - c/w Jardiance 10 QDay  - Lasix 40 IV BID   > Goal neg 1-2L per 24 hours   - F/U TTE  - Obtain records from primary cardiologist  - Strictly monitor I/Os   - f/u Cr     #AFib  #Hx of CRT-D  - Pt with long standing ?chronic AFib  - Home regimen: MTP Succ 25 QDay, Apixaban 2.5 QDay  - CHADSVASC 5  - Currently LB paced from CRT-D  Plan:  - Cont eliquis 2.5 BID   - c/w BB as noted above     #CP  #Elevated Troponin  - Trop 39 - 34, 50, 51 x2   - EKG paced  - Likely related to HF, low c/f ACS based on story  - Low c/f ACS  88 yo F with PMH of ?NICM/HFrEF (30-35%), s/p CRT-D ([facility] 3/9/23, [place] [facility]), AFib on AC, HTN and hypothyroidism.  Presented with CP and SOB. Cardiology consulted for CP.       #ADHF  #HFrEF   - Etiology unclear  - Last TTE 2/23 : EF 30-35% mod MR LAE   - NYHA: III  - ACC: C  - SCAI: B  - GDMT:    > BB: MTP Succ 25 QDay    > ACE/ARB/ARNI: Losartan 25 QDay (hold iso JOSE LUIS)    > MRA: Spironolactone 25 QDay    > SGLT2 I: Jardiance 10 QDay  - Diuretics: Lasix 20 (not taking)  - Volume Status: Hypervolemic (JVD)  - Pro BNP 3619 (7/8)/ 97546 (7/9)  Plan:  Cont home GDMT (+/- Jardiance if worried about hypoglycemia in the hospital) with exceptions as noted below:   - hold losartan iso JOSE LUIS  - c/w metoprolol succ 25 QDay  - c/w Spironolactone 25 QDay  - c/w Jardiance 10 QDay  - Lasix 40 IV BID   > Goal neg 1-2L per 24 hours   - F/U TTE  - Obtain records from primary cardiologist  - Strictly monitor I/Os   - f/u Cr     #AFib  #Hx of CRT-D  - Pt with long standing ?chronic AFib  - Home regimen: MTP Succ 25 QDay, Apixaban 2.5 QDay  - CHADSVASC 5  - Currently LB paced from CRT-D  Plan:  - Cont Eliquis 2.5 BID   - c/w BB as noted above     #CP  #Elevated Troponin  - Trop 39 - 34, 50, 51 x2   - EKG paced  - Likely related to HF, low c/f ACS based on story  - Low c/f ACS       Emilie Holman M.D.  Cardiology resident    Plan discussed with cardiology fellow and resident.  Patient seen and examined.  Hx., exam and labs as above.  I agree with the assessment and recommendations, which I have reviewed and edited where appropriate.  Kishore Coelho M.D.  Cardiology Attending, Consult Service    For Cardiology consults and questions, all Cardiology service information can be found 24/7 on [email or web address] - use password: [email or web address] to log in.

## 2023-07-11 LAB
ANION GAP SERPL CALC-SCNC: 11 MMOL/L — SIGNIFICANT CHANGE UP (ref 5–17)
BUN SERPL-MCNC: 55 MG/DL — HIGH (ref 7–23)
CALCIUM SERPL-MCNC: 9.6 MG/DL — SIGNIFICANT CHANGE UP (ref 8.4–10.5)
CHLORIDE SERPL-SCNC: 96 MMOL/L — SIGNIFICANT CHANGE UP (ref 96–108)
CO2 SERPL-SCNC: 30 MMOL/L — SIGNIFICANT CHANGE UP (ref 22–31)
CREAT SERPL-MCNC: 2.1 MG/DL — HIGH (ref 0.5–1.3)
EGFR: 23 ML/MIN/1.73M2 — LOW
GLUCOSE SERPL-MCNC: 85 MG/DL — SIGNIFICANT CHANGE UP (ref 70–99)
HCT VFR BLD CALC: 43.5 % — SIGNIFICANT CHANGE UP (ref 34.5–45)
HGB BLD-MCNC: 13.4 G/DL — SIGNIFICANT CHANGE UP (ref 11.5–15.5)
MAGNESIUM SERPL-MCNC: 3.9 MG/DL — HIGH (ref 1.6–2.6)
MCHC RBC-ENTMCNC: 27 PG — SIGNIFICANT CHANGE UP (ref 27–34)
MCHC RBC-ENTMCNC: 30.8 GM/DL — LOW (ref 32–36)
MCV RBC AUTO: 87.5 FL — SIGNIFICANT CHANGE UP (ref 80–100)
NRBC # BLD: 0 /100 WBCS — SIGNIFICANT CHANGE UP (ref 0–0)
PLATELET # BLD AUTO: 259 K/UL — SIGNIFICANT CHANGE UP (ref 150–400)
POTASSIUM SERPL-MCNC: 4.5 MMOL/L — SIGNIFICANT CHANGE UP (ref 3.5–5.3)
POTASSIUM SERPL-SCNC: 4.5 MMOL/L — SIGNIFICANT CHANGE UP (ref 3.5–5.3)
RBC # BLD: 4.97 M/UL — SIGNIFICANT CHANGE UP (ref 3.8–5.2)
RBC # FLD: 16.7 % — HIGH (ref 10.3–14.5)
SODIUM SERPL-SCNC: 137 MMOL/L — SIGNIFICANT CHANGE UP (ref 135–145)
WBC # BLD: 6.32 K/UL — SIGNIFICANT CHANGE UP (ref 3.8–10.5)
WBC # FLD AUTO: 6.32 K/UL — SIGNIFICANT CHANGE UP (ref 3.8–10.5)

## 2023-07-11 PROCEDURE — 99232 SBSQ HOSP IP/OBS MODERATE 35: CPT | Mod: GC

## 2023-07-11 RX ORDER — SIMETHICONE 80 MG/1
80 TABLET, CHEWABLE ORAL ONCE
Refills: 0 | Status: COMPLETED | OUTPATIENT
Start: 2023-07-11 | End: 2023-07-12

## 2023-07-11 RX ADMIN — Medication 12.5 MILLIGRAM(S): at 04:58

## 2023-07-11 RX ADMIN — APIXABAN 2.5 MILLIGRAM(S): 2.5 TABLET, FILM COATED ORAL at 04:57

## 2023-07-11 RX ADMIN — Medication 1 TABLET(S): at 11:10

## 2023-07-11 RX ADMIN — PANTOPRAZOLE SODIUM 40 MILLIGRAM(S): 20 TABLET, DELAYED RELEASE ORAL at 04:58

## 2023-07-11 RX ADMIN — Medication 650 MILLIGRAM(S): at 11:10

## 2023-07-11 RX ADMIN — APIXABAN 2.5 MILLIGRAM(S): 2.5 TABLET, FILM COATED ORAL at 17:22

## 2023-07-11 RX ADMIN — Medication 30 MILLILITER(S): at 05:01

## 2023-07-11 RX ADMIN — SPIRONOLACTONE 12.5 MILLIGRAM(S): 25 TABLET, FILM COATED ORAL at 04:57

## 2023-07-11 RX ADMIN — Medication 650 MILLIGRAM(S): at 12:06

## 2023-07-11 RX ADMIN — Medication 125 MICROGRAM(S): at 04:57

## 2023-07-11 RX ADMIN — PANTOPRAZOLE SODIUM 40 MILLIGRAM(S): 20 TABLET, DELAYED RELEASE ORAL at 17:22

## 2023-07-11 RX ADMIN — Medication 650 MILLIGRAM(S): at 21:52

## 2023-07-11 RX ADMIN — AMIODARONE HYDROCHLORIDE 100 MILLIGRAM(S): 400 TABLET ORAL at 04:58

## 2023-07-11 NOTE — PHYSICAL THERAPY INITIAL EVALUATION ADULT - PERTINENT HX OF CURRENT PROBLEM, REHAB EVAL
86yo F w/ hx HFrEF (35%) 01/2023 at [facility].fib on eliquis, HTN, hypothyroid presenting with cp. Pt has midsternal constant cp associated w/ dyspnea both worse with exertion. CP at rest. No fever, abd pain, n/v. Stopped taking lasix on monday due to mild hypotension to sbp 90's. No ble edema. CXR: (-), TTE 7/10

## 2023-07-11 NOTE — CONSULT NOTE ADULT - SUBJECTIVE AND OBJECTIVE BOX
HPI:  88yo F w/ hx HFrEF (35%) 01/2023 at [facility].fib on eliquis, HTN, hypothyroid presenting with cp. Pt has midsternal constant cp associated w/ dyspnea both worse with exertion. CP at rest. No fever, abd pain, n/v. Stopped taking lasix on monday due to mild hypotension to sbp 90's. No ble edema (08 Jul 2023 18:31)      Patient seen and examined at bedside. Euvolemic today, denies any SOB, CP, palpitations, weakness, fatigue, n/v, abd pain.     Overnight Events: NAEO     Current Meds:  acetaminophen     Tablet .. 650 milliGRAM(s) Oral every 6 hours PRN  aluminum hydroxide/magnesium hydroxide/simethicone Suspension 30 milliLiter(s) Oral every 4 hours PRN  aMIOdarone    Tablet 100 milliGRAM(s) Oral daily  apixaban 2.5 milliGRAM(s) Oral every 12 hours  levothyroxine 125 MICROGram(s) Oral daily  metoprolol succinate ER 12.5 milliGRAM(s) Oral daily  multivitamin/minerals 1 Tablet(s) Oral daily  pantoprazole    Tablet 40 milliGRAM(s) Oral two times a day  spironolactone 12.5 milliGRAM(s) Oral daily      Vitals:  T(F): 97.8 (07-11), Max: 97.8 (07-11)  HR: 63 (07-11) (60 - 64)  BP: 96/59 (07-11) (96/59 - 105/65)  RR: 18 (07-11)  SpO2: 98% (07-11)  I&O's Summary    10 Jul 2023 07:01  -  11 Jul 2023 07:00  --------------------------------------------------------  IN: 240 mL / OUT: 800 mL / NET: -560 mL    11 Jul 2023 07:01  -  11 Jul 2023 11:21  --------------------------------------------------------  IN: 120 mL / OUT: 300 mL / NET: -180 mL        Physical Exam:  Appearance: No acute distress; well appearing, elderly   Eyes: PERRL, EOMI, pink conjunctiva  HEENT: Normal oral mucosa  Cardiovascular: RRR, S1, S2, grade II/IV holosystolic murmur; no peripheral edema; no JVD  Respiratory: Clear to auscultation bilaterally  Gastrointestinal: soft, non-tender, non-distended with normal bowel sounds  Musculoskeletal: No clubbing; no joint deformity   Neurologic: Non-focal  Lymphatic: No lymphadenopathy  Psychiatry: AAOx3, mood & affect appropriate  Skin: No rashes, ecchymoses, or cyanosis                          13.4   6.32  )-----------( 259      ( 11 Jul 2023 06:54 )             43.5     07-11    137  |  96  |  55<H>  ----------------------------<  85  4.5   |  30  |  2.10<H>    Ca    9.6      11 Jul 2023 06:52  Mg     3.9     07-11        CARDIAC MARKERS ( 09 Jul 2023 02:25 )  51 ng/L / x     / x     / x     / x     / x      CARDIAC MARKERS ( 08 Jul 2023 20:18 )  51 ng/L / x     / x     / 50 U/L / x     / 3.1 ng/mL  CARDIAC MARKERS ( 08 Jul 2023 15:23 )  39 ng/L / x     / x     / x     / x     / x      CARDIAC MARKERS ( 08 Jul 2023 14:11 )  34 ng/L / x     / x     / x     / x     / x        7/10/23 TTE regional WMA new from prior , mild/mod MR   TTE W or WO Ultrasound Enhancing Agent (07.10.23 @ 09:00) >  CONCLUSIONS:      1. The left ventricular systolic function is mildly to moderately decreased with an ejection fraction visually estimated at 45 to 50 %.   2. Basal and mid inferior wall, basal and mid inferolateral wall, and basal anterolateral segment are abnormal.    3. Device lead is visualized in the right heart.      2/23/23 TTE  < from: Transthoracic Echocardiogram (02.23.23 @ 10:08) >  Conclusions:  1. Mitral annular calcification. Tethered mitral valve  leaflets with normal opening. Moderate mitral  regurgitation.  2. Normal trileaflet aortic valve. Minimal aortic  regurgitation.  3. Mildly dilated left atrium.  LA volume index = 39 cc/m2.  Normal left ventricular internal dimensions and wall  thicknesses.Moderate global left ventricular systolic  dysfunction. LVEF calculated using biplane Little's method  is 35%. Septal motion consistent with conduction defect.  Unable to determine diastolic function due to merged E and  A wave.  4. Normal right atrium. Normal right ventricular size and  function.  5. Normal tricuspid valve. Minimal tricuspid  regurgitation.Unable to estimate RVSP.  10. Small loculated pericardial effusion mostly around the  right atrium.      7/10 EKG atrial sensed ventricularly paced rhythm     A/P      Emilie Holman PGY1    DWA 86yo F w/ hx HFrEF (35%) 01/2023 at [facility]fib on eliquis, HTN, hypothyroid presenting with cp. Pt has midsternal constant cp associated w/ dyspnea both worse with exertion. CP at rest. No fever, abd pain, n/v. Stopped taking lasix on Monday due to mild hypotension to sbp 90's. No ble edema (08 Jul 2023 18:31)    Patient seen and examined at bedside. Euvolemic today, denies any SOB, CP, palpitations, weakness, fatigue, n/v, abd pain.     Overnight Events: NAEO     Current Meds:  acetaminophen     Tablet .. 650 milliGRAM(s) Oral every 6 hours PRN  aluminum hydroxide/magnesium hydroxide/simethicone Suspension 30 milliLiter(s) Oral every 4 hours PRN  aMIOdarone    Tablet 100 milliGRAM(s) Oral daily  apixaban 2.5 milliGRAM(s) Oral every 12 hours  levothyroxine 125 MICROGram(s) Oral daily  metoprolol succinate ER 12.5 milliGRAM(s) Oral daily  multivitamin/minerals 1 Tablet(s) Oral daily  pantoprazole    Tablet 40 milliGRAM(s) Oral two times a day  spironolactone 12.5 milliGRAM(s) Oral daily      Vitals:  T(F): 97.8 (07-11), Max: 97.8 (07-11)  HR: 63 (07-11) (60 - 64)  BP: 96/59 (07-11) (96/59 - 105/65)  RR: 18 (07-11)  SpO2: 98% (07-11)    I&O's Summary  10 Jul 2023 07:01  -  11 Jul 2023 07:00  --------------------------------------------------------  IN: 240 mL / OUT: 800 mL / NET: -560 mL    11 Jul 2023 07:01  -  11 Jul 2023 11:21  --------------------------------------------------------  IN: 120 mL / OUT: 300 mL / NET: -180 mL        Physical Exam:  Appearance: No acute distress; well appearing, elderly   Eyes: PERRL, EOMI, pink conjunctiva  HEENT: Normal oral mucosa  Cardiovascular: RRR, S1, S2, grade II/IV holosystolic murmur; no peripheral edema; no JVD  Respiratory: Clear to auscultation bilaterally  Gastrointestinal: soft, non-tender, non-distended with normal bowel sounds  Musculoskeletal: No clubbing; no joint deformity   Neurologic: Non-focal  Lymphatic: No lymphadenopathy  Psychiatry: AAOx3, mood & affect appropriate  Skin: No rashes, ecchymoses, or cyanosis      LABS:                      13.4   6.32  )-----------( 259      ( 11 Jul 2023 06:54 )             43.5     07-11    137  |  96  |  55<H>  ----------------------------<  85  4.5   |  30  |  2.10<H>    Ca    9.6      11 Jul 2023 06:52  Mg     3.9     07-11      CARDIAC MARKERS ( 09 Jul 2023 02:25 )  51 ng/L / x     / x     / x     / x     / x      CARDIAC MARKERS ( 08 Jul 2023 20:18 )  51 ng/L / x     / x     / 50 U/L / x     / 3.1 ng/mL  CARDIAC MARKERS ( 08 Jul 2023 15:23 )  39 ng/L / x     / x     / x     / x     / x      CARDIAC MARKERS ( 08 Jul 2023 14:11 )  34 ng/L / x     / x     / x     / x     / x          7/10/23 TTE regional WMA new from prior , mild/mod MR   TTE W or WO Ultrasound Enhancing Agent (07.10.23 @ 09:00) >  CONCLUSIONS:    1. The left ventricular systolic function is mildly to moderately decreased with an ejection fraction visually estimated at 45 to 50 %.   2. Basal and mid inferior wall, basal and mid inferolateral wall, and basal anterolateral segment are abnormal.    3. Device lead is visualized in the right heart.      2/23/23 TTE  < from: Transthoracic Echocardiogram (02.23.23 @ 10:08) >  Conclusions:  1. Mitral annular calcification. Tethered mitral valve  leaflets with normal opening. Moderate mitral  regurgitation.  2. Normal trileaflet aortic valve. Minimal aortic  regurgitation.  3. Mildly dilated left atrium.  LA volume index = 39 cc/m2.  Normal left ventricular internal dimensions and wall  thicknesses.Moderate global left ventricular systolic  dysfunction. LVEF calculated using biplane Little's method  is 35%. Septal motion consistent with conduction defect.  Unable to determine diastolic function due to merged E and  A wave.  4. Normal right atrium. Normal right ventricular size and  function.  5. Normal tricuspid valve. Minimal tricuspid  regurgitation.Unable to estimate RVSP.  10. Small loculated pericardial effusion mostly around the  right atrium.      7/10 EKG atrial sensed ventricularly paced rhythm

## 2023-07-11 NOTE — PHYSICAL THERAPY INITIAL EVALUATION ADULT - ADDITIONAL COMMENTS
pt lives alone in apartment, (+)elevator, ramp to enter. pt independent with mobility, owns rolling walker, states only occasionally utilizes device. pt has HHA 4d,6h

## 2023-07-11 NOTE — CONSULT NOTE ADULT - ASSESSMENT
88 yo F with PMH of ?NICM/HFrEF (30-35%), s/p CRT-D ([facility] 3/9/23, [place] [facility]), AFib on AC, HTN and hypothyroidism.  Presented with CP and SOB. Cardiology consulted for CP.     #ADHF  #HFrEF   - Etiology unclear  - TTE 2/23 : EF 30-35% mod MR LAE   - TTE 7/10/23: EF 45-50%, mod MR, akinetic basal and mid inf. wall, basal and mid inferolateral wall, basal anterolateral segment  - NYHA: III  - ACC: C  - SCAI: B  - GDMT:    > BB: MTP Succ 25 QDay    > ACE/ARB/ARNI: Losartan 25 QDay (hold iso JOSE LUIS)    > MRA: Spironolactone 25 QDay    > SGLT2 I: Jardiance 10 QDay  - Diuretics: Lasix 20 (not taking)  - Volume Status: Hypervolemic (JVD)  - Pro BNP 3619 (7/8)/ 47502 (7/9)  Plan:  - on partial GDMT iso JOSE LUIS,   - cont home GDMT when renal function improves   - hold losartan iso JOSE LUIS  - metoprolol succ 25 QDay  - Spironolactone 25 QDay  - Jardiance 10 QDay  - patient presently euvolemic, hold Lasix 40 IV BID   - Obtain records from primary cardiologist  - Strictly monitor I/Os   - f/u Cr     #AFib  #Hx of CRT-D  - Pt with long standing ?chronic AFib  - Home regimen: MTP Succ 25 QDay, Apixaban 2.5 QDay  - CHADSVASC 5  - Currently LB paced from CRT-D  Plan:  - Cont Eliquis 2.5 BID   - c/w BB as noted above   - c/w amiodarone 100mg   #CP  #Elevated Troponin  - Trop 39 - 34, 50, 51 x2   - EKG paced  - Likely related to HF, low c/f ACS based on story  - Low c/f ACS    86 yo F with PMH of ?NICM/HFrEF (30-35%), s/p CRT-D ([facility] 3/9/23, [place] [facility]), AFib on AC, HTN and hypothyroidism.  Presented with CP and SOB. Cardiology consulted for CP.     #ADHF  #HFrEF   - Etiology unclear  - TTE 2/23 : EF 30-35% mod MR LAE   - TTE 7/10/23: EF 45-50%, mod MR, akinetic basal and mid inf. wall, basal and mid inferolateral wall, basal anterolateral segment  - NYHA: III  - ACC: C  - SCAI: B  - GDMT:    > BB: MTP Succ 25 QDay     > ACE/ARB/ARNI: Losartan 25 QDay (hold iso JOSE LUIS)    > MRA: Spironolactone 25 QDay    > SGLT2 I: Jardiance 10 QDay  - Diuretics: Lasix 20 (not taking)  - Volume Status: Hypervolemic (JVD)  - Pro BNP 3619 (7/8)/ 46501 (7/9)  Plan:  - on partial GDMT iso JOSE LUIS,   - cont home GDMT when renal function improves   - hold losartan iso JOSE LUIS  - metoprolol succ 25 QDay - patient currently on 12.5mg qd, unclear reason, recommend uptitrating to 25   - Spironolactone 25 QDay - patient currently on 12.5mg qd, unclear reason, recommend uptitrating to 25   - Jardiance 10 QDay  - patient presently euvolemic, hold Lasix 40 IV BID   - Obtain records from primary cardiologist  - Strictly monitor I/Os   - f/u Cr     #AFib  #Hx of CRT-D  - Pt with long standing ?chronic AFib  - Home regimen: MTP Succ 25 QDay, Apixaban 2.5 QDay  - CHADSVASC 5  - Currently LB paced from CRT-D  - Started on amiodarone 100mg by primary team   Plan:  - Cont Eliquis 2.5 BID   - c/w BB as noted above     #CP  #Elevated Troponin  - Trop 39 - 34, 50, 51 x2   - EKG paced  - Likely related to HF, low c/f ACS based on story  - Low c/f ACS    86 yo F with PMH of ?NICM/HFrEF (30-35%), s/p CRT-D ([facility] 3/9/23, [place] [facility]), AFib on AC, HTN and hypothyroidism.  Presented with CP and SOB. Cardiology consulted for CP/SOB.       #ADHF  #HFrEF/NICM  - TTE 2/23 : EF 30-35% mod MR LAE   - TTE 7/10/23: EF 45-50%, mod MR, akinetic basal and mid inf. wall, basal and mid inferolateral wall, basal anterolateral segment  - NYHA: III  - ACC: C  - SCAI: B  - GDMT:    > BB: MTP Succ 25 QDay     > ACE/ARB/ARNI: Losartan 25 QDay (hold iso JOSE LUIS)    > MRA: Spironolactone 25 QDay    > SGLT2 I: Jardiance 10 QDay  - Diuretics: Lasix 20 (not taking)  - Volume Status: Hypervolemic (JVD)  - Pro BNP 3619 (7/8)/ 26791 (7/9)  Plan:  - on partial GDMT iso JOSE LUIS,   - cont home GDMT when renal function improves   - hold losartan iso JOSE LUIS  - metoprolol succ 25 QDay - patient currently on 12.5mg qd, unclear reason, recommend uptitrating to 25   - Spironolactone 25 QDay - patient currently on 12.5mg qd, unclear reason, recommend uptitrating to 25   - Jardiance 10 QDay  - patient presently euvolemic, hold Lasix 40 IV BID   - Strictly monitor I/Os   - f/u Cr     #AFib  #Hx of CRT-D  - Pt with PAF (was in [facility] in Feb. 2023)  - Home regimen: MTP Succ 25 QDay, Apixaban 2.5 QDay  - CHADSVASC 5  - Currently LB paced from CRT-D  - Started on amiodarone 100mg by primary team   Plan:  - Cont Eliquis 2.5 BID   - c/w BB as noted above     #CP  #Elevated Troponin  - Trop 39 - 34, 50, 51 x2   - EKG paced  - Likely related to HF, low c/f ACS based on story  - Low c/f ACS     Emilie Holman M.D.  Cardiology resident    Plan discussed with cardiology fellow.  Patient seen and examined.  Hx., exam and labs as above.  I agree with the assessment and recommendations, which I have reviewed and edited where appropriate.  Kishore Coelho M.D.  Cardiology Attending, Consult Service    For Cardiology consults and questions, all Cardiology service information can be found 24/7 on [email or web address] - use password: [email or web address] to log in.

## 2023-07-12 ENCOUNTER — TRANSCRIPTION ENCOUNTER (OUTPATIENT)
Age: 88
End: 2023-07-12

## 2023-07-12 LAB
ANION GAP SERPL CALC-SCNC: 12 MMOL/L — SIGNIFICANT CHANGE UP (ref 5–17)
BUN SERPL-MCNC: 48 MG/DL — HIGH (ref 7–23)
CALCIUM SERPL-MCNC: 9.3 MG/DL — SIGNIFICANT CHANGE UP (ref 8.4–10.5)
CHLORIDE SERPL-SCNC: 100 MMOL/L — SIGNIFICANT CHANGE UP (ref 96–108)
CK MB CFR SERPL CALC: 3.8 NG/ML — SIGNIFICANT CHANGE UP (ref 0–3.8)
CO2 SERPL-SCNC: 25 MMOL/L — SIGNIFICANT CHANGE UP (ref 22–31)
CREAT SERPL-MCNC: 1.58 MG/DL — HIGH (ref 0.5–1.3)
EGFR: 32 ML/MIN/1.73M2 — LOW
GLUCOSE SERPL-MCNC: 95 MG/DL — SIGNIFICANT CHANGE UP (ref 70–99)
MAGNESIUM SERPL-MCNC: 3.1 MG/DL — HIGH (ref 1.6–2.6)
MYOGLOBIN SERPL-MCNC: 35 NG/ML — SIGNIFICANT CHANGE UP (ref 25–58)
POTASSIUM SERPL-MCNC: 4.5 MMOL/L — SIGNIFICANT CHANGE UP (ref 3.5–5.3)
POTASSIUM SERPL-SCNC: 4.5 MMOL/L — SIGNIFICANT CHANGE UP (ref 3.5–5.3)
SODIUM SERPL-SCNC: 137 MMOL/L — SIGNIFICANT CHANGE UP (ref 135–145)
TROPONIN T, HIGH SENSITIVITY RESULT: 32 NG/L — SIGNIFICANT CHANGE UP (ref 0–51)

## 2023-07-12 PROCEDURE — 99233 SBSQ HOSP IP/OBS HIGH 50: CPT | Mod: GC

## 2023-07-12 PROCEDURE — 93010 ELECTROCARDIOGRAM REPORT: CPT

## 2023-07-12 RX ORDER — FAMOTIDINE 10 MG/ML
1 INJECTION INTRAVENOUS
Refills: 0 | DISCHARGE

## 2023-07-12 RX ORDER — FUROSEMIDE 40 MG
20 TABLET ORAL
Refills: 0 | Status: DISCONTINUED | OUTPATIENT
Start: 2023-07-12 | End: 2023-07-12

## 2023-07-12 RX ORDER — FUROSEMIDE 40 MG
20 TABLET ORAL
Refills: 0 | Status: DISCONTINUED | OUTPATIENT
Start: 2023-07-13 | End: 2023-07-13

## 2023-07-12 RX ORDER — FUROSEMIDE 40 MG
20 TABLET ORAL ONCE
Refills: 0 | Status: COMPLETED | OUTPATIENT
Start: 2023-07-12 | End: 2023-07-12

## 2023-07-12 RX ORDER — PANTOPRAZOLE SODIUM 20 MG/1
1 TABLET, DELAYED RELEASE ORAL
Qty: 0 | Refills: 0 | DISCHARGE

## 2023-07-12 RX ADMIN — APIXABAN 2.5 MILLIGRAM(S): 2.5 TABLET, FILM COATED ORAL at 17:06

## 2023-07-12 RX ADMIN — SPIRONOLACTONE 12.5 MILLIGRAM(S): 25 TABLET, FILM COATED ORAL at 05:46

## 2023-07-12 RX ADMIN — Medication 650 MILLIGRAM(S): at 18:56

## 2023-07-12 RX ADMIN — Medication 20 MILLIGRAM(S): at 17:06

## 2023-07-12 RX ADMIN — AMIODARONE HYDROCHLORIDE 100 MILLIGRAM(S): 400 TABLET ORAL at 05:46

## 2023-07-12 RX ADMIN — Medication 20 MILLIGRAM(S): at 14:37

## 2023-07-12 RX ADMIN — Medication 650 MILLIGRAM(S): at 19:33

## 2023-07-12 RX ADMIN — Medication 125 MICROGRAM(S): at 05:46

## 2023-07-12 RX ADMIN — PANTOPRAZOLE SODIUM 40 MILLIGRAM(S): 20 TABLET, DELAYED RELEASE ORAL at 17:06

## 2023-07-12 RX ADMIN — APIXABAN 2.5 MILLIGRAM(S): 2.5 TABLET, FILM COATED ORAL at 05:46

## 2023-07-12 RX ADMIN — PANTOPRAZOLE SODIUM 40 MILLIGRAM(S): 20 TABLET, DELAYED RELEASE ORAL at 05:46

## 2023-07-12 RX ADMIN — SIMETHICONE 80 MILLIGRAM(S): 80 TABLET, CHEWABLE ORAL at 23:01

## 2023-07-12 RX ADMIN — Medication 12.5 MILLIGRAM(S): at 05:46

## 2023-07-12 NOTE — PROVIDER CONTACT NOTE (OTHER) - DATE AND TIME:
08-Jul-2023 19:58
09-Jul-2023 00:54
09-Jul-2023 04:55
09-Jul-2023 21:39
12-Jul-2023 14:30
10-Jul-2023 04:55

## 2023-07-12 NOTE — PROVIDER CONTACT NOTE (OTHER) - BACKGROUND
Admitted for CHF.
Admitted for chest pain.
pt admitting dx chest pain

## 2023-07-12 NOTE — DISCHARGE NOTE PROVIDER - CARE PROVIDER_API CALL
Prince Fink  Cardiology  [address] [place] [address]  Phone: [phone]  Fax: [phone]  Follow Up Time: 1 week

## 2023-07-12 NOTE — DISCHARGE NOTE PROVIDER - HOSPITAL COURSE
87 f with    Acute on Chronic Systolic Congestive Heart Failure  - telemetry  - diurese on hold for JOSE LUIS  - echo pending   - cardiology evaluation     JOSE LUIS  - diurese on hold  - follow Cr, lytes    Hypothyroid  - TSH noted  - supplement     Hypercholesterolemia  - stable    Epigastric discomfort/ Heartburn  - refuses GI evaluation  - Increased PPI to bid  - Maalox    DVT prophylaxis  - AC   87 f with    Acute on Chronic Systolic Congestive Heart Failure  - telemetry  - diurese on hold for JOSE LUIS  - echo pending   - cardiology evaluation     JOSE LUIS  - diurese on hold  - follow Cr, lytes    Hypothyroid  - TSH noted  - supplement     Hypercholesterolemia  - stable    Epigastric discomfort/ Heartburn  - refuses GI evaluation  - Increased PPI to bid  - Maalox    DVT prophylaxis  - AC    Medically cleared by Dr. Loja for discharge home with home care.

## 2023-07-12 NOTE — DISCHARGE NOTE PROVIDER - NSDCCPCAREPLAN_GEN_ALL_CORE_FT
PRINCIPAL DISCHARGE DIAGNOSIS  Diagnosis: Chest pain  Assessment and Plan of Treatment: Volume overloaded.  Given IV diuretics in the hospital.  Please follow up with cardiology and primary care.   Echo 7/10/23: EF 45-50%, mod MR, akinetic basal and mid inf. wall, basal and mid inferolateral wall, basal anterolateral segment     PRINCIPAL DISCHARGE DIAGNOSIS  Diagnosis: Chest pain  Assessment and Plan of Treatment: Volume overloaded.  Given IV diuretics in the hospital.  Continue with oral diuretics. Please follow up with cardiology and primary care.   Echo 7/10/23: EF 45-50%, mod MR, akinetic basal and mid inf. wall, basal and mid inferolateral wall, basal anterolateral segment     PRINCIPAL DISCHARGE DIAGNOSIS  Diagnosis: Chest pain  Assessment and Plan of Treatment: Volume overloaded.  Given IV diuretics in the hospital.  Continue with oral diuretics. Please follow up with cardiology and primary care.   Echo 7/10/23: EF 45-50%, mod MR, akinetic basal and mid inf. wall, basal and mid inferolateral wall, basal anterolateral segment      SECONDARY DISCHARGE DIAGNOSES  Diagnosis: Acute on chronic systolic congestive heart failure  Assessment and Plan of Treatment: Weigh yourself daily.  If you gain 3lbs in 3 days, or 5lbs in a week call your Health Care Provider.  Do not eat or drink foods containing more than 2000mg of salt (sodium) in your diet every day.  Call your Health Care Provider if you have any swelling or increased swelling in your feet, ankles, and/or stomach.  Take all of your medication as directed.  If you become dizzy call your Health Care Provider.    Diagnosis: Chronic atrial fibrillation  Assessment and Plan of Treatment: Atrial fibrillation is the most common heart rhythm problem.  The condition puts you at risk for has stroke and heart attack  It helps if you control your blood pressure, not drink more than 1-2 alcohol drinks per day, cut down on caffeine, getting treatment for over active thyroid gland, and get regular exercise  Call your doctor if you feel your heart racing or beating unusually, chest tightness or pain, lightheaded, faint, shortness of breath especially with exercise  It is important to take your heart medication as prescribed  You may be on anticoagulation which is very important to take as directed - you may need blood work to monitor drug levels

## 2023-07-12 NOTE — DIETITIAN INITIAL EVALUATION ADULT - OTHER INFO
dosing wt: 41.5 kg (7/8)  daily wts: 40.8 kg (7/11)  wt hx per [facility] HIE: 40.4 kg (2/27), 43.1 kg (4/20/22) dosing wt: 41.5 kg (7/8)  daily wts: 40.8 kg (7/11)  wt hx per [facility] HIE: 40.4 kg (2/27), 43.1 kg (4/20/22)  reported UBW: ~41 kg x 10 years

## 2023-07-12 NOTE — CONSULT NOTE ADULT - ASSESSMENT
88 yo F with PMH of ?NICM/HFrEF (30-35%), s/p CRT-D ([facility] 3/9/23, [facility]), AFib on AC, HTN and hypothyroidism. Presented with CP and SOB. Cardiology consulted for CP/SOB. Presently being managed for CHF exacerbation iso prolonged diuretic discontinuation, hospital course c/b JOSE LUIS currently improving. Plan to resume diuresis Lasix 20mg PO BID, restart GDMT as tolerated.     #ADHF  #HFrEF/NICM  - TTE 2/23 : EF 30-35% mod MR LAE   - TTE 7/10/23: EF 45-50%, mod MR, akinetic basal and mid inf. wall, basal and mid inferolateral wall, basal anterolateral segment  - NYHA: III  - ACC: C  - SCAI: B  - GDMT:    > BB: MTP Succ 25 QDay     > ACE/ARB/ARNI: Losartan 25 QDay     > MRA: Spironolactone 25 QDay    > SGLT2 I: Jardiance 10 QDay  - Diuretics: Lasix 20 (not taking)  - Volume Status: Hypervolemic (JVD)  - Pro BNP 3619 (7/8)/ 77845 (7/9)  Plan:  - on partial GDMT iso JOSE LUIS,   - cont home GDMT as renal function improves, can restart diuresis as patient is presently euvolemic with improving JOSE LUIS  - hold losartan iso JOSE LUIS, resume when Cr returns to baseline (~1.2)   - metoprolol succ 25 QDay - patient currently on 12.5mg qd, unclear reason, recommend uptitrating to 25   - Spironolactone 25 QDay - patient currently on 12.5mg qd, unclear reason, recommend uptitrating to 25   - Jardiance 10 QDay  - start Lasix 20mg PO BID   - Strictly monitor I/Os   - f/u Cr     #AFib  #Hx of CRT-D  - Pt with PAF (was in SR in Feb. 2023)  - Home regimen: MTP Succ 25 QDay, Apixaban 2.5 QDay  - CHADSVASC 5  - Currently LB paced from CRT-D  - Started on amiodarone 100mg by primary team   Plan:  - Cont Eliquis 2.5 BID   - c/w BB as noted above     #CP  #Elevated Troponin  - Trop 39 - 34, 50, 51 x2   - EKG paced  - Likely related to HF, low c/f ACS based on story    Continue with medical management as per primary team. Cardiology will sign off at this time, please call back with any further questions.     Emilie Holman M.D.  PGY1 Medicine    DWA    86 yo F with PMH of ?NICM/HFrEF (45-50% on TTE on 7/10), s/p CRT-D ([facility] 3/9/23), PAF on AC, and HTN.  Presented with CP and SOB; cardiology consulted.   Presently being managed for CHF exacerbation after diuretic discontinuation prior to admission.  Current hospital course c/b JOSE LUIS, now improving. Plan to resume diuresis Lasix 20mg PO BID, restart GDMT as tolerated.     #ADHF  #HFrEF/NICM  - TTE 7/10/23: EF 45-50%, trace MR, akinetic basal and mid inf. wall, basal and mid inferolateral wall, basal anterolateral segment  - NYHA: III  - ACC: C  - SCAI: B  - GDMT:    > BB:  metop succ 25 QDay     > ACE/ARB/ARNI: losartan 25 QDay     > MRA: spironolactone 25 QDay    > SGLT2 I: Jardiance 10 QDay  - Diuretics: would resume Lasix 20  mg. bid  - Volume Status: Hypervolemic (JVD)  - Pro BNP 3619 (7/8)/ 09554 (7/9)  Plan:  - on partial GDMT iso JOSE LUIS,   - cont home GDMT as renal function improves, can restart diuresis as patient is presently euvolemic with improving JOSE LUIS  - hold losartan iso JOSE LUIS, resume when Cr returns to baseline (~1.2)   - metoprolol succ 25 QDay - patient currently on 12.5mg qd, unclear reason, recommend uptitrating to 25   - Spironolactone 25 QDay - patient currently on 12.5mg qd, unclear reason, recommend uptitrating to 25   - Jardiance 10 QDay  - resume Lasix 20mg PO BID   - Strictly monitor I/Os   - f/u Cr     #AFib, current in A-V paced rhythm  #Hx of CRT-D  - Pt with PAF (was in SR in Feb. 2023)  - Home regimen: MTP Succ 25 QDay, Apixaban 2.5 QDay  - CHADSVASC 5  - Currently LB paced from CRT-D  - continue amiodarone 100 mg qd   Plan:  - Cont Eliquis 2.5 BID   - c/w BB as noted above     #CP  #Elevated Troponin  - Trop 39 - 34, 50, 51 x2   - EKG paced  - Likely related to HF, low c/f ACS based on story    Continue with medical management as per primary team. Cardiology will sign off at this time, please call back with any further questions.     Emilie Holman M.D.  Cardiology resident     Plan discussed with cardiology fellow and resident.  Patient seen and examined.  Hx., exam and labs as above.  I agree with the assessment and recommendations, which I have reviewed and edited where appropriate.  Kishore Coelho M.D.  Cardiology Attending, Consult Service    For Cardiology consults and questions, all Cardiology service information can be found 24/7 on [email or web address] - use password: [email or web address] to log in.

## 2023-07-12 NOTE — DISCHARGE NOTE PROVIDER - NSFOLLOWUPCLINICS_GEN_ALL_ED_FT
Cardiology at [facility]  Cardiology  [address] [place] [address]  Phone: [phone]  Fax:

## 2023-07-12 NOTE — CONSULT NOTE ADULT - SUBJECTIVE AND OBJECTIVE BOX
HPI:  88yo F w/ hx HFrEF (35%) 01/2023 at [facility] s/p CRT-D 3/23, a.fib on eliquis, HTN, hypothyroid presenting with cp. Pt has midsternal constant cp associated w/ dyspnea both worse with exertion for last 4d. CP at rest. No fever, abd pain, n/v. Stopped taking lasix on monday due to mild hypotension to sbp 90's.      Patient seen and examined at bedside. States that she feels improved, denies CP, SOB, weakness, fatigue, n/v, abd pain.     Overnight Events: NAEO     Telemetry: NSR     Current Meds:  acetaminophen Tablet .. 650 milliGRAM(s) Oral every 6 hours PRN  aMIOdarone    Tablet 100 milliGRAM(s) Oral daily  apixaban 2.5 milliGRAM(s) Oral every 12 hours  levothyroxine 125 MICROGram(s) Oral daily  metoprolol succinate ER 12.5 milliGRAM(s) Oral daily  pantoprazole    Tablet 40 milliGRAM(s) Oral two times a day  simethicone 80 milliGRAM(s) Chew once  spironolactone 12.5 milliGRAM(s) Oral daily      Vitals:  T(F): 97.6 (07-12), Max: 98.1 (07-11)  HR: 59 (07-12) (58 - 63)  BP: 113/70 (07-12) (96/59 - 113/70)  RR: 18 (07-12)  SpO2: 96% (07-12)  I&O's Summary    11 Jul 2023 07:01  -  12 Jul 2023 07:00  --------------------------------------------------------  IN: 240 mL / OUT: 1250 mL / NET: -1010 mL    12 Jul 2023 07:01  -  12 Jul 2023 10:43  --------------------------------------------------------  IN: 120 mL / OUT: 0 mL / NET: 120 mL        Physical Exam:  Appearance: No acute distress; well appearing  Eyes: PERRL, EOMI, pink conjunctiva  HEENT: Normal oral mucosa  Cardiovascular: RRR, S1, S2, grade II/IV holosystolic murmur, rubs, or gallops; no edema; no JVD  Respiratory: Clear to auscultation bilaterally  Gastrointestinal: soft, non-tender, non-distended with normal bowel sounds  Musculoskeletal: No clubbing; no joint deformity   Neurologic: Non-focal  Lymphatic: No lymphadenopathy  Psychiatry: AAOx3, mood & affect appropriate  Skin: No rashes, ecchymoses, or cyanosis                          13.4   6.32  )-----------( 259      ( 11 Jul 2023 06:54 )             43.5     07-12    137  |  100  |  48<H>  ----------------------------<  95  4.5   |  25  |  1.58<H>    Ca    9.3      12 Jul 2023 06:08  Mg     3.1     07-12        CARDIAC MARKERS ( 09 Jul 2023 02:25 )  51 ng/L / x     / x     / x     / x     / x      CARDIAC MARKERS ( 08 Jul 2023 20:18 )  51 ng/L / x     / x     / 50 U/L / x     / 3.1 ng/mL  CARDIAC MARKERS ( 08 Jul 2023 15:23 )  39 ng/L / x     / x     / x     / x     / x      CARDIAC MARKERS ( 08 Jul 2023 14:11 )  34 ng/L / x     / x     / x     / x     / x        TTE 2/23/23< from: Transthoracic Echocardiogram (02.23.23 @ 10:08) >  Conclusions:  1. Mitral annular calcification. Tethered mitral valve  leaflets with normal opening. Moderate mitral  regurgitation.  2. Normal trileaflet aortic valve. Minimal aortic  regurgitation.  3. Mildly dilated left atrium.  LA volume index = 39 cc/m2.  Normal left ventricular internal dimensions and wall  thicknesses.Moderate global left ventricular systolic  dysfunction. LVEF calculated using biplane Little's method  is 35%. Septal motion consistent with conduction defect.  Unable to determine diastolic function due to merged E and  A wave.  4. Normal right atrium. Normal right ventricular size and  function.  5. Normal tricuspid valve. Minimal tricuspid  regurgitation.Unable to estimate RVSP.  10. Small loculated pericardial effusion mostly around the  right atrium.    < end of copied text >    TTE 7/10/23 < from: TTE W or WO Ultrasound Enhancing Agent (07.10.23 @ 09:00) >  CONCLUSIONS:      1. The left ventricular systolic function is mildly to moderately decreased with an ejection fraction visually estimated at 45 to 50 %.   2. Basal and mid inferior wall, basal and mid inferolateral wall, and basal anterolateral segment are abnormal.   3. Device lead is visualized in the right heart.  trace MR     New ECG(s): Personally reviewed HPI:  88yo F w/ hx HFrEF (35%) 01/2023 at [facility] s/p CRT-D 3/23, a.fib on eliquis, HTN, hypothyroid.  Presented midsternal constant cp associated w/ dyspnea, both worse with exertion.  Had stopped Lasix prior to admission.     Patient seen and examined at bedside. States that she feels improved, denies CP, SOB, weakness, fatigue, n/v, abd pain.     Overnight Events: NAEO     Telemetry:  A-V paced     Current Meds:  acetaminophen Tablet .. 650 milliGRAM(s) Oral every 6 hours PRN  aMIOdarone    Tablet 100 milliGRAM(s) Oral daily  apixaban 2.5 milliGRAM(s) Oral every 12 hours  levothyroxine 125 MICROGram(s) Oral daily  metoprolol succinate ER 12.5 milliGRAM(s) Oral daily  pantoprazole    Tablet 40 milliGRAM(s) Oral two times a day  simethicone 80 milliGRAM(s) Chew once  spironolactone 12.5 milliGRAM(s) Oral daily      Vitals:  T(F): 97.6 (07-12), Max: 98.1 (07-11)  HR: 59 (07-12) (58 - 63)  BP: 113/70 (07-12) (96/59 - 113/70)  RR: 18 (07-12)  SpO2: 96% (07-12)    I&O's Summary  11 Jul 2023 07:01  -  12 Jul 2023 07:00  --------------------------------------------------------  IN: 240 mL / OUT: 1250 mL / NET: -1010 mL    12 Jul 2023 07:01  -  12 Jul 2023 10:43  --------------------------------------------------------  IN: 120 mL / OUT: 0 mL / NET: 120 mL        Physical Exam:  Appearance: No acute distress; well appearing  Eyes: PERRL, EOMI, pink conjunctiva  HEENT: Normal oral mucosa  Cardiovascular: RRR, S1, S2, grade II/IV holosystolic murmur, rubs, or gallops; no edema; no JVD  Respiratory: Clear to auscultation bilaterally  Gastrointestinal: soft, non-tender, non-distended with normal bowel sounds  Musculoskeletal: No clubbing; no joint deformity   Neurologic: Non-focal  Lymphatic: No lymphadenopathy  Psychiatry: AAOx3, mood & affect appropriate  Skin: No rashes, ecchymoses, or cyanosis      LABS:                      13.4   6.32  )-----------( 259      ( 11 Jul 2023 06:54 )             43.5     07-12  137  |  100  |  48<H>  ----------------------------<  95  4.5   |  25  |  1.58<H>    Ca    9.3      12 Jul 2023 06:08  Mg     3.1     07-12    CARDIAC MARKERS ( 09 Jul 2023 02:25 )  51 ng/L / x     / x     / x     / x     / x      CARDIAC MARKERS ( 08 Jul 2023 20:18 )  51 ng/L / x     / x     / 50 U/L / x     / 3.1 ng/mL  CARDIAC MARKERS ( 08 Jul 2023 15:23 )  39 ng/L / x     / x     / x     / x     / x      CARDIAC MARKERS ( 08 Jul 2023 14:11 )  34 ng/L / x     / x     / x     / x     / x          TTE 2/23/23< from: Transthoracic Echocardiogram (02.23.23 @ 10:08) >  Conclusions:  1. Mitral annular calcification. Tethered mitral valve leaflets with normal opening. Moderate mitral regurgitation.  2. Normal trileaflet aortic valve. Minimal aortic regurgitation.  3. Mildly dilated left atrium.  LA volume index = 39 cc/m2.   4. Normal left ventricular internal dimensions and wall thicknesses.  Moderate global left ventricular systolic dysfunction. LVEF calculated using biplane Little's method is 35%. Septal motion consistent with conduction defect. Unable to determine diastolic function due to merged E and A wave.  5. Normal right atrium. Normal right ventricular size and function.  5. Normal tricuspid valve. Minimal tricuspid regurgitation.Unable to estimate RVSP.  10. Small loculated pericardial effusion mostly around the right atrium.      TTE 7/10/23 < from: TTE W or WO Ultrasound Enhancing Agent (07.10.23 @ 09:00) >  CONCLUSIONS:   1. The left ventricular systolic function is mildly to moderately decreased with an ejection fraction visually estimated at 45 to 50 %.   2. Basal and mid inferior wall, basal and mid inferolateral wall, and basal anterolateral segment are abnormal.   3. Device lead is visualized in the right heart.    4. Trace MR

## 2023-07-12 NOTE — PROVIDER CONTACT NOTE (OTHER) - REASON
BP meds this AM - /70
Electronic BP 82/44. Manual BP 90/50
Manual BP 84/50
Open Parameter Meds for tonight
/68 - BP meds
pt c/o chest tightness and shortness of breath

## 2023-07-12 NOTE — PROVIDER CONTACT NOTE (OTHER) - ACTION/TREATMENT ORDERED:
ROBERT Ball made aware. Seen at bedside. EKG completed. IVP lasix ordered
Provider will assess pt.
Give Eliquis, hold all other open parameter medication.
Retake BP in 30 minutes.
Administer IV push Lasix. Move aldactone, metoprolol, & losartan to 8 AM.
BP was low throughout the night. Reschedule metoprolol, aldactone, & losartan administration to 8 AM.

## 2023-07-12 NOTE — DIETITIAN INITIAL EVALUATION ADULT - ORAL INTAKE PTA/DIET HISTORY
Chart reviewed, events noted. Pt reports she is "an eater" but has had trouble gaining weight over the last few years. Eats regular foods - diet includes fruits, vegetables, salads, peanut butter on sourdough bread, meat/fish, sandwiches. Pt avoiding tomato sauce/tomatoes 2/2 reflux. Sometimes drinks a vanilla glucerna mixed with a little chocolate glucerna, but not consistently. Denies issues chewing/swallowing. Pt allergic to shrimp (shellfish) and reports intolerance to mushrooms (stomachache).

## 2023-07-12 NOTE — CHART NOTE - NSCHARTNOTEFT_GEN_A_CORE
Notified by nurse that patient was complaining of chest tightness and shortness of breath.  Patient seen and assessed at bedside.     Vital Signs Last 24 Hrs  T(C): 36.4 (12 Jul 2023 11:17), Max: 36.7 (11 Jul 2023 20:21)  T(F): 97.5 (12 Jul 2023 11:17), Max: 98.1 (11 Jul 2023 20:21)  HR: 61 (12 Jul 2023 11:17) (58 - 61)  BP: 106/72 (12 Jul 2023 11:17) (102/62 - 113/70)  BP(mean): --  RR: 18 (12 Jul 2023 11:17) (18 - 18)  SpO2: 96% (12 Jul 2023 11:17) (94% - 96%)    Parameters below as of 12 Jul 2023 11:17  Patient On (Oxygen Delivery Method): room air    Physical Exam:  Appearance: No acute distress; well appearing  Eyes: PERRL, EOMI, pink conjunctiva  Cardiovascular: RRR, S1, S2, grade II/IV holosystolic murmur, rubs, or gallops; no edema; no JVD  Respiratory: Clear to auscultation bilaterally  Gastrointestinal: soft, non-tender, non-distended with normal bowel sounds  Musculoskeletal: No clubbing; no joint deformity   Neurologic: Non-focal  Lymphatic: No lymphadenopathy  Psychiatry: AAOx3, mood & affect appropriate  Skin: No rashes, ecchymoses, or cyanosis      88yo F w/ hx HFrEF (35%) 01/2023 at [facility].fib on eliquis, HTN, hypothyroid presenting with cp. Pt has midsternal constant cp associated w/ dyspnea both worse with exertion. CP at rest. No fever, abd pain, n/v. Stopped taking lasix on monday due to mild hypotension to sbp 90's. No ble edema.  Today, complaining of chest tightness and shortness of breath.  Patient states she feels this way when she is volume overloaded.      Chest Tightness/SOB     Vital signs stat - stable  ECG stat - sent to attending and cardiology fellow: V paced with PVCs  Cardiac enzymes sent  Lasix IV push x1  Will continue to monitor      Lizzy Rodriguez PA-C

## 2023-07-12 NOTE — DISCHARGE NOTE PROVIDER - NSDCMRMEDTOKEN_GEN_ALL_CORE_FT
amiodarone 200 mg oral tablet: 0.5 tab(s) orally once a day  cholecalciferol 50 mcg (2000 intl units) oral tablet: 1 tab(s) orally once a day  Eliquis 2.5 mg oral tablet: 1 tab(s) orally 2 times a day  furosemide 20 mg oral tablet: 1 tab(s) orally once a day  Jardiance 10 mg oral tablet: 1 tab(s) orally once a day  levothyroxine 125 mcg (0.125 mg) oral tablet: 1 tab(s) orally once a day  losartan 25 mg oral tablet: 1 tab(s) orally once a day  metoprolol succinate 25 mg oral tablet, extended release: 0.5 tab(s) orally once a day  Mylanta DS Fast Acting oral suspension: 15 milliliter(s) orally once a day as needed for  indigestion  pantoprazole 40 mg oral delayed release tablet: 1 tab(s) orally 2 times a day  PreserVision AREDS 2 oral capsule: 1 cap(s) orally once a day  spironolactone 25 mg oral tablet: 0.5 tab(s) orally once a day   amiodarone 200 mg oral tablet: 0.5 tab(s) orally once a day  cholecalciferol 50 mcg (2000 intl units) oral tablet: 1 tab(s) orally once a day  Eliquis 2.5 mg oral tablet: 1 tab(s) orally 2 times a day  furosemide 20 mg oral tablet: 1 tab(s) orally 2 times a day  Jardiance 10 mg oral tablet: 1 tab(s) orally once a day  levothyroxine 125 mcg (0.125 mg) oral tablet: 1 tab(s) orally once a day  metoprolol succinate 25 mg oral tablet, extended release: 0.5 tab(s) orally once a day  Mylanta DS Fast Acting oral suspension: 15 milliliter(s) orally once a day as needed for  indigestion  pantoprazole 40 mg oral delayed release tablet: 1 tab(s) orally 2 times a day  PreserVision AREDS 2 oral capsule: 1 cap(s) orally once a day  spironolactone 25 mg oral tablet: 0.5 tab(s) orally once a day

## 2023-07-12 NOTE — DISCHARGE NOTE PROVIDER - NSDCFUADDAPPT_GEN_ALL_CORE_FT
OB APPTS ARE READY TO BE MADE: [x] YES    Best Family or Patient Contact (if needed):    Additional Information about above appointments (if needed):    1: Dr. Fink  2:   3:     Other comments or requests:    APPTS ARE READY TO BE MADE: [x] YES    Best Family or Patient Contact (if needed):    Additional Information about above appointments (if needed):    1: Dr. Fink  2:   3:     Other comments or requests:       Patient/Caregiver was provided with follow up request details and prefers to call the providers office on their own to schedule.

## 2023-07-12 NOTE — PROVIDER CONTACT NOTE (OTHER) - ASSESSMENT
Patient is A&Ox4, no distress, no dizziness, no SOB, all other VSS, see in flow sheets.
pt a&ox4. VSS. States tightness is similar to when she is fluid overloaded. Lasix previously on hold for JOSE LUIS
Patient is A&Ox4, no distress, no dizziness, no SOB, VSS, see in flow sheets.
Patient is A&Ox4, no distress, no dizziness, no SOB.
Patient is A&Ox4, no distress, no dizziness, no SOB, VSS, see in flow sheets.
Patient is A&Ox4, reports no distress, no dizziness, no SOB, all other VSS, see in flow sheets.

## 2023-07-12 NOTE — DIETITIAN INITIAL EVALUATION ADULT - PERTINENT MEDS FT
MEDICATIONS  (STANDING):  aMIOdarone    Tablet 100 milliGRAM(s) Oral daily  apixaban 2.5 milliGRAM(s) Oral every 12 hours  furosemide    Tablet 20 milliGRAM(s) Oral two times a day  levothyroxine 125 MICROGram(s) Oral daily  metoprolol succinate ER 12.5 milliGRAM(s) Oral daily  pantoprazole    Tablet 40 milliGRAM(s) Oral two times a day  simethicone 80 milliGRAM(s) Chew once  spironolactone 12.5 milliGRAM(s) Oral daily    MEDICATIONS  (PRN):  acetaminophen     Tablet .. 650 milliGRAM(s) Oral every 6 hours PRN Temp greater or equal to 38.5C (101.3F), Mild Pain (1 - 3)

## 2023-07-12 NOTE — DIETITIAN INITIAL EVALUATION ADULT - NSFNSADHERENCEPTAFT_GEN_A_CORE
Pt follows a low sodium diet and also watches sugar intake (reports being pre-DM in the past, thinks her HbA1c was 5.6%). Pt not on any meds for pre-DM.

## 2023-07-12 NOTE — PROVIDER CONTACT NOTE (OTHER) - SITUATION
pt c/o chest tightness and shortness of breath
/68 - BP meds
BP meds this AM - /70
Electronic BP 82/44. Manual BP 90/50
Manual BP 84/50
Open Parameter Meds for tonight

## 2023-07-12 NOTE — DIETITIAN INITIAL EVALUATION ADULT - NSFNSGIIOFT_GEN_A_CORE
Pt denies any N/V/D. Pt reports constipation - not currently on bowel regimen. Amenable to prunes. Pt denies any N/V/D. Pt reports constipation - not currently on bowel regimen. Pt states no BM since admission (x 4 days). Amenable to prunes.

## 2023-07-12 NOTE — PROGRESS NOTE ADULT - NUTRITIONAL ASSESSMENT
This patient has been assessed with a concern for Malnutrition and has been determined to have a diagnosis/diagnoses of Underweight (BMI < 19).    This patient is being managed with:   Diet Regular-  Low Sodium  Entered: Jul 8 2023  5:28PM

## 2023-07-12 NOTE — DIETITIAN INITIAL EVALUATION ADULT - EDUCATION DIETARY MODIFICATIONS
low sodium diet principles; general healthy balanced diet; high calorie/protein foods to promote wt gain/(2) meets goals/outcomes/verbalization

## 2023-07-12 NOTE — DIETITIAN INITIAL EVALUATION ADULT - REASON FOR ADMISSION
Per chart, Pt is a 86 y/o F with PMH: "HFrEF (35%) 01/2023 at [facility] .fib on eliquis, Hx of CRT-D, HTN, hypothyroid presenting with cp. Likely related to HF. IV lasix on hold 2/2 JOSE LUIS."

## 2023-07-12 NOTE — DIETITIAN INITIAL EVALUATION ADULT - ADD RECOMMEND
1) continue low sodium diet  2) add bowel regimen 2/2 constipation; RD to provide prunes  3) honor food prefs as offered/able; RD to add diet mighty shakes  4) daily wts to trend  5) BMI Malnutrition sticker placed, RD to follow-up as per protocol   6) Monitor PO intake, weight, labs, skin, GI status, diet

## 2023-07-12 NOTE — DISCHARGE NOTE PROVIDER - DETAILS OF MALNUTRITION DIAGNOSIS/DIAGNOSES
This patient has been assessed with a concern for Malnutrition and was treated during this hospitalization for the following Nutrition diagnosis/diagnoses:     -  07/12/2023: Underweight (BMI < 19)

## 2023-07-12 NOTE — DIETITIAN INITIAL EVALUATION ADULT - ENERGY INTAKE
Adequate (%) Pt reports good PO intake/appetite. Happy with hospital foods. Noted menu at bedside and Pt reports she has been ordering her preferred meals. RN flowsheets report Pt with % of most meals. Pt amenable to oral nutrition supplements. Food preferences reviewed, will honor as able. Verbal low sodium diet education provided, Pt demonstrated excellent understanding. Pt declined written education. Pt reports good PO intake/appetite. Happy with hospital foods. Reports eating cherrios with half a banana for breakfast, turkey sandwiches for lunch, and fish with vegetables for dinner. Noted menu at bedside and Pt reports she has been ordering her preferred meals. RN flowsheets report Pt with % of most meals. Pt amenable to oral nutrition supplements. Food preferences reviewed, will honor as able. Verbal low sodium diet education provided, Pt demonstrated excellent understanding. Pt declined written education.

## 2023-07-13 ENCOUNTER — TRANSCRIPTION ENCOUNTER (OUTPATIENT)
Age: 88
End: 2023-07-13

## 2023-07-13 VITALS
HEART RATE: 59 BPM | SYSTOLIC BLOOD PRESSURE: 100 MMHG | DIASTOLIC BLOOD PRESSURE: 62 MMHG | TEMPERATURE: 98 F | OXYGEN SATURATION: 97 % | RESPIRATION RATE: 18 BRPM

## 2023-07-13 LAB
ANION GAP SERPL CALC-SCNC: 13 MMOL/L — SIGNIFICANT CHANGE UP (ref 5–17)
BUN SERPL-MCNC: 50 MG/DL — HIGH (ref 7–23)
CALCIUM SERPL-MCNC: 9.2 MG/DL — SIGNIFICANT CHANGE UP (ref 8.4–10.5)
CHLORIDE SERPL-SCNC: 100 MMOL/L — SIGNIFICANT CHANGE UP (ref 96–108)
CO2 SERPL-SCNC: 25 MMOL/L — SIGNIFICANT CHANGE UP (ref 22–31)
CREAT SERPL-MCNC: 1.48 MG/DL — HIGH (ref 0.5–1.3)
EGFR: 34 ML/MIN/1.73M2 — LOW
GLUCOSE SERPL-MCNC: 92 MG/DL — SIGNIFICANT CHANGE UP (ref 70–99)
MAGNESIUM SERPL-MCNC: 2.7 MG/DL — HIGH (ref 1.6–2.6)
POTASSIUM SERPL-MCNC: 4 MMOL/L — SIGNIFICANT CHANGE UP (ref 3.5–5.3)
POTASSIUM SERPL-SCNC: 4 MMOL/L — SIGNIFICANT CHANGE UP (ref 3.5–5.3)
SODIUM SERPL-SCNC: 138 MMOL/L — SIGNIFICANT CHANGE UP (ref 135–145)

## 2023-07-13 PROCEDURE — 84484 ASSAY OF TROPONIN QUANT: CPT

## 2023-07-13 PROCEDURE — 83880 ASSAY OF NATRIURETIC PEPTIDE: CPT

## 2023-07-13 PROCEDURE — 0225U NFCT DS DNA&RNA 21 SARSCOV2: CPT

## 2023-07-13 PROCEDURE — 83735 ASSAY OF MAGNESIUM: CPT

## 2023-07-13 PROCEDURE — 82550 ASSAY OF CK (CPK): CPT

## 2023-07-13 PROCEDURE — 93005 ELECTROCARDIOGRAM TRACING: CPT

## 2023-07-13 PROCEDURE — 85610 PROTHROMBIN TIME: CPT

## 2023-07-13 PROCEDURE — 97161 PT EVAL LOW COMPLEX 20 MIN: CPT

## 2023-07-13 PROCEDURE — 85027 COMPLETE CBC AUTOMATED: CPT

## 2023-07-13 PROCEDURE — 96374 THER/PROPH/DIAG INJ IV PUSH: CPT

## 2023-07-13 PROCEDURE — 93306 TTE W/DOPPLER COMPLETE: CPT

## 2023-07-13 PROCEDURE — 84443 ASSAY THYROID STIM HORMONE: CPT

## 2023-07-13 PROCEDURE — 99285 EMERGENCY DEPT VISIT HI MDM: CPT

## 2023-07-13 PROCEDURE — 83874 ASSAY OF MYOGLOBIN: CPT

## 2023-07-13 PROCEDURE — 71045 X-RAY EXAM CHEST 1 VIEW: CPT

## 2023-07-13 PROCEDURE — 85730 THROMBOPLASTIN TIME PARTIAL: CPT

## 2023-07-13 PROCEDURE — 36415 COLL VENOUS BLD VENIPUNCTURE: CPT

## 2023-07-13 PROCEDURE — 85025 COMPLETE CBC W/AUTO DIFF WBC: CPT

## 2023-07-13 PROCEDURE — 82553 CREATINE MB FRACTION: CPT

## 2023-07-13 PROCEDURE — 80053 COMPREHEN METABOLIC PANEL: CPT

## 2023-07-13 PROCEDURE — 80048 BASIC METABOLIC PNL TOTAL CA: CPT

## 2023-07-13 RX ORDER — LOSARTAN POTASSIUM 100 MG/1
1 TABLET, FILM COATED ORAL
Qty: 0 | Refills: 0 | DISCHARGE

## 2023-07-13 RX ORDER — FUROSEMIDE 40 MG
1 TABLET ORAL
Refills: 0 | DISCHARGE

## 2023-07-13 RX ORDER — FUROSEMIDE 40 MG
1 TABLET ORAL
Qty: 60 | Refills: 0
Start: 2023-07-13 | End: 2023-08-11

## 2023-07-13 RX ADMIN — AMIODARONE HYDROCHLORIDE 100 MILLIGRAM(S): 400 TABLET ORAL at 05:34

## 2023-07-13 RX ADMIN — Medication 12.5 MILLIGRAM(S): at 05:36

## 2023-07-13 RX ADMIN — APIXABAN 2.5 MILLIGRAM(S): 2.5 TABLET, FILM COATED ORAL at 05:35

## 2023-07-13 RX ADMIN — Medication 20 MILLIGRAM(S): at 05:34

## 2023-07-13 RX ADMIN — SPIRONOLACTONE 12.5 MILLIGRAM(S): 25 TABLET, FILM COATED ORAL at 07:03

## 2023-07-13 RX ADMIN — PANTOPRAZOLE SODIUM 40 MILLIGRAM(S): 20 TABLET, DELAYED RELEASE ORAL at 05:34

## 2023-07-13 RX ADMIN — Medication 20 MILLIGRAM(S): at 13:49

## 2023-07-13 RX ADMIN — Medication 125 MICROGRAM(S): at 05:35

## 2023-07-13 NOTE — DISCHARGE NOTE NURSING/CASE MANAGEMENT/SOCIAL WORK - NSDCFUADDAPPT_GEN_ALL_CORE_FT
APPTS ARE READY TO BE MADE: [x] YES    Best Family or Patient Contact (if needed):    Additional Information about above appointments (if needed):    1: Dr. Fink  2:   3:     Other comments or requests:

## 2023-07-13 NOTE — DISCHARGE NOTE NURSING/CASE MANAGEMENT/SOCIAL WORK - PATIENT PORTAL LINK FT
You can access the FollowMyHealth Patient Portal offered by [facility] by registering at the following website: [email or web address]. By joining [facility]’s FollowMyHealth portal, you will also be able to view your health information using other applications (apps) compatible with our system.

## 2023-07-13 NOTE — PROGRESS NOTE ADULT - ASSESSMENT
87 f with    Acute on Chronic Systolic Congestive Heart Failure  - telemetry  - didavee gentle   - cardiology follow      JOSE LUIS  - diurese on hold  - follow Cr, lytes    Hypothyroid  - TSH noted  - supplement     Hypercholesterolemia  - stable    Epigastric discomfort/ Heartburn  - refuses GI evaluation  - Increased PPI to bid  - Maalox    Chest pain  - cardiac enzymes  - atypical  - had recent cath with normal coronaries as per her cardiologist     DVT prophylaxis  - AC    Iker Loja MD phone [phone] 
87 f with    Acute on Chronic Systolic Congestive Heart Failure  - telemetry  - diurese gentle   - cardiology follow      JOSE LUIS  - diurese on hold  - follow Cr, lytes    Hypothyroid  - TSH noted  - supplement     Hypercholesterolemia  - stable    Epigastric discomfort/ Heartburn  - refuses GI evaluation  - Increased PPI to bid  - Maalox    Chest pain  - cardiac enzymes  - atypical  - had recent cath with normal coronaries as per her cardiologist     DVT prophylaxis  - AC    DC home. Follow with PMD/ Cardiology in 3-4 days. QA     Iker Loja MD phone [phone] 
87 f with    Acute on Chronic Systolic Congestive Heart Failure  - telemetry  - diurese on hold for JOSE LUIS  - echo pending   - cardiology evaluation     JOSE LUIS  - diurese on hold  - follow Cr, lytes    Hypothyroid  - TSH noted  - supplement     Hypercholesterolemia  - stable    Epigastric discomfort/ Heartburn  - refuses GI evaluation  - Increase PPI to bid  - Maalox    DVT prophylaxis  - AC    Iker Loja MD phone [phone] 
87 f with    Acute on Chronic Systolic Congestive Heart Failure  - telemetry  - diurese on hold for JOSE LUIS  - echo pending   - cardiology evaluation     JOSE LUIS  - diurese on hold  - follow Cr, lytes    Hypothyroid  - TSH noted  - supplement     Hypercholesterolemia  - stable    Epigastric discomfort/ Heartburn  - refuses GI evaluation  - Increased PPI to bid  - Maalox    DVT prophylaxis  - AC    Iker Loja MD phone [phone] 
87 f with    Acute on Chronic Systolic Congestive Heart Failure  - telemetry  - diurese  - echo pending   - cardiology evaluation     Hypothyroid  - TSH noted  - supplement     Hypercholesterolemia  - stable    DVT prophylaxis  - AC    Iker Loja MD phone [phone]

## 2023-07-13 NOTE — DISCHARGE NOTE NURSING/CASE MANAGEMENT/SOCIAL WORK - NSDCPEFALRISK_GEN_ALL_CORE
For information on Fall & Injury Prevention, visit: [email or web address] OR  [email or web address] OR  https://www.cdc.gov/steadi/patient.html

## 2023-07-13 NOTE — PROGRESS NOTE ADULT - SUBJECTIVE AND OBJECTIVE BOX
Patient is a 87y old  Female who presents with a chief complaint of     SUBJECTIVE / OVERNIGHT EVENTS: c/o epigastric discomfort, heartburn.  Review of Systems  chest pain no  palpitations no  sob no  nausea no  headache no    MEDICATIONS  (STANDING):  aMIOdarone    Tablet 100 milliGRAM(s) Oral daily  apixaban 2.5 milliGRAM(s) Oral every 12 hours  furosemide   Injectable 40 milliGRAM(s) IV Push daily  levothyroxine 125 MICROGram(s) Oral daily  metoprolol succinate ER 12.5 milliGRAM(s) Oral daily  multivitamin/minerals 1 Tablet(s) Oral daily  pantoprazole    Tablet 40 milliGRAM(s) Oral two times a day  spironolactone 12.5 milliGRAM(s) Oral daily    MEDICATIONS  (PRN):  acetaminophen     Tablet .. 650 milliGRAM(s) Oral every 6 hours PRN Temp greater or equal to 38.5C (101.3F), Mild Pain (1 - 3)  aluminum hydroxide/magnesium hydroxide/simethicone Suspension 30 milliLiter(s) Oral every 4 hours PRN Dyspepsia      Vital Signs Last 24 Hrs  T(C): 36.4 (10 Jul 2023 10:56), Max: 36.6 (10 Jul 2023 04:38)  T(F): 97.6 (10 Jul 2023 10:56), Max: 97.8 (10 Jul 2023 04:38)  HR: 64 (10 Jul 2023 10:56) (59 - 66)  BP: 93/60 (10 Jul 2023 10:56) (93/56 - 110/70)  BP(mean): --  RR: 18 (10 Jul 2023 10:56) (18 - 18)  SpO2: 95% (10 Jul 2023 10:56) (95% - 96%)    Parameters below as of 10 Jul 2023 10:56  Patient On (Oxygen Delivery Method): room air        PHYSICAL EXAM:  GENERAL: NAD   HEAD:  Atraumatic, Normocephalic  EYES: EOMI, PERRLA, conjunctiva and sclera clear  NECK: Supple, No JVD  CHEST/LUNG: Clear to auscultation bilaterally; No wheeze  HEART: Regular rate and rhythm; No murmurs, rubs, or gallops  ABDOMEN: Soft, Nontender, Nondistended; Bowel sounds present  EXTREMITIES:  2+ Peripheral Pulses, No clubbing, cyanosis, or edema  PSYCH: AAOx3  NEUROLOGY: non-focal  SKIN: No rashes or lesions    LABS:                        12.9   5.54  )-----------( 211      ( 10 Jul 2023 06:22 )             40.3     07-10    137  |  98  |  43<H>  ----------------------------<  81  4.8   |  28  |  1.83<H>    Ca    9.3      10 Jul 2023 06:22            Urinalysis Basic - ( 10 Jul 2023 06:22 )    Color: x / Appearance: x / SG: x / pH: x  Gluc: 81 mg/dL / Ketone: x  / Bili: x / Urobili: x   Blood: x / Protein: x / Nitrite: x   Leuk Esterase: x / RBC: x / WBC x   Sq Epi: x / Non Sq Epi: x / Bacteria: x          RADIOLOGY & ADDITIONAL TESTS:    Imaging Personally Reviewed:    Consultant(s) Notes Reviewed:      Care Discussed with Consultants/Other Providers:  
Patient is a 87y old  Female who presents with a chief complaint of     SUBJECTIVE / OVERNIGHT EVENTS: Comfortable without new complaints.   Review of Systems  chest pain no  palpitations no  sob improving   nausea no  headache no    MEDICATIONS  (STANDING):  aMIOdarone    Tablet 100 milliGRAM(s) Oral daily  apixaban 2.5 milliGRAM(s) Oral every 12 hours  furosemide   Injectable 40 milliGRAM(s) IV Push daily  levothyroxine 125 MICROGram(s) Oral daily  losartan 25 milliGRAM(s) Oral daily  metoprolol succinate ER 12.5 milliGRAM(s) Oral daily  multivitamin/minerals 1 Tablet(s) Oral daily  pantoprazole    Tablet 40 milliGRAM(s) Oral before breakfast  spironolactone 12.5 milliGRAM(s) Oral daily    MEDICATIONS  (PRN):  acetaminophen     Tablet .. 650 milliGRAM(s) Oral every 6 hours PRN Temp greater or equal to 38.5C (101.3F), Mild Pain (1 - 3)  aluminum hydroxide/magnesium hydroxide/simethicone Suspension 30 milliLiter(s) Oral every 4 hours PRN Dyspepsia      Vital Signs Last 24 Hrs  T(C): 36.2 (09 Jul 2023 12:01), Max: 37.1 (08 Jul 2023 17:13)  T(F): 97.2 (09 Jul 2023 12:01), Max: 98.7 (08 Jul 2023 17:13)  HR: 62 (09 Jul 2023 12:01) (59 - 72)  BP: 98/61 (09 Jul 2023 12:01) (82/50 - 113/68)  BP(mean): --  RR: 18 (09 Jul 2023 12:01) (16 - 18)  SpO2: 96% (09 Jul 2023 12:01) (94% - 99%)    Parameters below as of 09 Jul 2023 12:01  Patient On (Oxygen Delivery Method): room air        PHYSICAL EXAM:  GENERAL: NAD, well-developed  HEAD:  Atraumatic, Normocephalic  EYES: EOMI, PERRLA, conjunctiva and sclera clear  NECK: Supple, No JVD  CHEST/LUNG: few rales to auscultation bilaterally; No wheeze  HEART: Regular rate and rhythm; No murmurs, rubs, or gallops  ABDOMEN: Soft, Nontender, Nondistended; Bowel sounds present  EXTREMITIES:  2+ Peripheral Pulses, No clubbing, cyanosis, or edema  PSYCH: AAOx3  NEUROLOGY: non-focal  SKIN: No rashes or lesions    LABS:                        13.0   5.71  )-----------( 217      ( 08 Jul 2023 14:11 )             41.7     07-09    137  |  97  |  37<H>  ----------------------------<  87  4.0   |  23  |  1.54<H>    Ca    9.5      09 Jul 2023 06:36  Mg     3.2     07-08    TPro  7.5  /  Alb  4.2  /  TBili  0.2  /  DBili  x   /  AST  16  /  ALT  13  /  AlkPhos  69  07-08    PT/INR - ( 08 Jul 2023 14:11 )   PT: 14.3 sec;   INR: 1.24 ratio         PTT - ( 08 Jul 2023 14:11 )  PTT:38.1 sec  CARDIAC MARKERS ( 08 Jul 2023 20:18 )  x     / x     / 50 U/L / x     / 3.1 ng/mL      Urinalysis Basic - ( 09 Jul 2023 06:36 )    Color: x / Appearance: x / SG: x / pH: x  Gluc: 87 mg/dL / Ketone: x  / Bili: x / Urobili: x   Blood: x / Protein: x / Nitrite: x   Leuk Esterase: x / RBC: x / WBC x   Sq Epi: x / Non Sq Epi: x / Bacteria: x          RADIOLOGY & ADDITIONAL TESTS:    Imaging Personally Reviewed:    Consultant(s) Notes Reviewed:      Care Discussed with Consultants/Other Providers:  
Patient is a 87y old  Female who presents with a chief complaint of Per chart, Pt is a 86 y/o F with PMH: "HFrEF (35%) 01/2023 at [facility], [email or web address] on eliquis, Hx of CRT-D, HTN, hypothyroid presenting with cp. Likely related to HF. IV lasix on hold 2/2 JOSE LUIS." (12 Jul 2023 13:55)      SUBJECTIVE / OVERNIGHT EVENTS: events noted. Had chest pain today.  Review of Systems  palpitations no  sob no  nausea no  headache no    MEDICATIONS  (STANDING):  aMIOdarone    Tablet 100 milliGRAM(s) Oral daily  apixaban 2.5 milliGRAM(s) Oral every 12 hours  levothyroxine 125 MICROGram(s) Oral daily  metoprolol succinate ER 12.5 milliGRAM(s) Oral daily  pantoprazole    Tablet 40 milliGRAM(s) Oral two times a day  simethicone 80 milliGRAM(s) Chew once  spironolactone 12.5 milliGRAM(s) Oral daily    MEDICATIONS  (PRN):  acetaminophen     Tablet .. 650 milliGRAM(s) Oral every 6 hours PRN Temp greater or equal to 38.5C (101.3F), Mild Pain (1 - 3)      Vital Signs Last 24 Hrs  T(C): 36.4 (12 Jul 2023 11:17), Max: 36.7 (11 Jul 2023 20:21)  T(F): 97.5 (12 Jul 2023 11:17), Max: 98.1 (11 Jul 2023 20:21)  HR: 61 (12 Jul 2023 11:17) (58 - 61)  BP: 106/72 (12 Jul 2023 11:17) (102/62 - 113/70)  BP(mean): --  RR: 18 (12 Jul 2023 11:17) (18 - 18)  SpO2: 96% (12 Jul 2023 11:17) (94% - 96%)    Parameters below as of 12 Jul 2023 11:17  Patient On (Oxygen Delivery Method): room air        PHYSICAL EXAM:  GENERAL: NAD  HEAD:  Atraumatic, Normocephalic  EYES: EOMI, PERRLA, conjunctiva and sclera clear  NECK: Supple, No JVD  CHEST/LUNG: Clear to auscultation bilaterally; No wheeze  HEART: Regular rate and rhythm; No murmurs, rubs, or gallops  ABDOMEN: Soft, Nontender, Nondistended; Bowel sounds present  EXTREMITIES:  2+ Peripheral Pulses, No clubbing, cyanosis, or edema  PSYCH: AAOx3  NEUROLOGY: non-focal  SKIN: No rashes or lesions    LABS:                        13.4   6.32  )-----------( 259      ( 11 Jul 2023 06:54 )             43.5     07-12    137  |  100  |  48<H>  ----------------------------<  95  4.5   |  25  |  1.58<H>    Ca    9.3      12 Jul 2023 06:08  Mg     3.1     07-12        CARDIAC MARKERS ( 12 Jul 2023 16:29 )  x     / x     / x     / x     / 3.8 ng/mL      Urinalysis Basic - ( 12 Jul 2023 06:08 )    Color: x / Appearance: x / SG: x / pH: x  Gluc: 95 mg/dL / Ketone: x  / Bili: x / Urobili: x   Blood: x / Protein: x / Nitrite: x   Leuk Esterase: x / RBC: x / WBC x   Sq Epi: x / Non Sq Epi: x / Bacteria: x    EKG Paced NSST/T      RADIOLOGY & ADDITIONAL TESTS:    Imaging Personally Reviewed:    Consultant(s) Notes Reviewed:      Care Discussed with Consultants/Other Providers:  
Patient is a 87y old  Female who presents with a chief complaint of     SUBJECTIVE / OVERNIGHT EVENTS: Comfortable without new complaints.   Review of Systems  chest pain no  palpitations no  sob no  nausea no  headache no    MEDICATIONS  (STANDING):  aMIOdarone    Tablet 100 milliGRAM(s) Oral daily  apixaban 2.5 milliGRAM(s) Oral every 12 hours  levothyroxine 125 MICROGram(s) Oral daily  metoprolol succinate ER 12.5 milliGRAM(s) Oral daily  pantoprazole    Tablet 40 milliGRAM(s) Oral two times a day  spironolactone 12.5 milliGRAM(s) Oral daily    MEDICATIONS  (PRN):  acetaminophen     Tablet .. 650 milliGRAM(s) Oral every 6 hours PRN Temp greater or equal to 38.5C (101.3F), Mild Pain (1 - 3)      Vital Signs Last 24 Hrs  T(C): 36.6 (11 Jul 2023 11:08), Max: 36.6 (11 Jul 2023 11:08)  T(F): 97.8 (11 Jul 2023 11:08), Max: 97.8 (11 Jul 2023 11:08)  HR: 63 (11 Jul 2023 11:08) (60 - 64)  BP: 96/59 (11 Jul 2023 11:08) (96/59 - 105/65)  BP(mean): --  RR: 18 (11 Jul 2023 11:08) (18 - 18)  SpO2: 98% (11 Jul 2023 11:08) (96% - 98%)    Parameters below as of 11 Jul 2023 11:08  Patient On (Oxygen Delivery Method): room air        PHYSICAL EXAM:  GENERAL: NAD, well-developed  HEAD:  Atraumatic, Normocephalic  EYES: EOMI, PERRLA, conjunctiva and sclera clear  NECK: Supple, No JVD  CHEST/LUNG: Clear to auscultation bilaterally; No wheeze  HEART: Regular rate and rhythm; No murmurs, rubs, or gallops  ABDOMEN: Soft, Nontender, Nondistended; Bowel sounds present  EXTREMITIES:  2+ Peripheral Pulses, No clubbing, cyanosis, or edema  PSYCH: AAOx3  NEUROLOGY: non-focal  SKIN: No rashes or lesions    LABS:                        13.4   6.32  )-----------( 259      ( 11 Jul 2023 06:54 )             43.5     07-11    137  |  96  |  55<H>  ----------------------------<  85  4.5   |  30  |  2.10<H>    Ca    9.6      11 Jul 2023 06:52  Mg     3.9     07-11            Urinalysis Basic - ( 11 Jul 2023 06:52 )    Color: x / Appearance: x / SG: x / pH: x  Gluc: 85 mg/dL / Ketone: x  / Bili: x / Urobili: x   Blood: x / Protein: x / Nitrite: x   Leuk Esterase: x / RBC: x / WBC x   Sq Epi: x / Non Sq Epi: x / Bacteria: x    < from: TTE W or WO Ultrasound Enhancing Agent (07.10.23 @ 09:00) >  CONCLUSIONS:      1. The left ventricular systolic function is mildly to moderately decreased with an ejection fraction visually estimated at 45 to 50 %.   2. Basal and mid inferior wall, basal and mid inferolateral wall, and basal anterolateral segment are abnormal.   3. Device lead is visualized in the right heart.    < end of copied text >        RADIOLOGY & ADDITIONAL TESTS:    Imaging Personally Reviewed:    Consultant(s) Notes Reviewed:      Care Discussed with Consultants/Other Providers:  
Patient is a 87y old  Female who presents with a chief complaint of Per chart, Pt is a 88 y/o F with PMH: "HFrEF (35%) 01/2023 at [facility], [place].fib on eliquis, Hx of CRT-D, HTN, hypothyroid presenting with cp. Likely related to HF. IV lasix on hold 2/2 JOSE LUIS." (12 Jul 2023 13:55)      SUBJECTIVE / OVERNIGHT EVENTS: Comfortable without new complaints.   Review of Systems  chest pain no  palpitations no  sob no  nausea no  headache no    MEDICATIONS  (STANDING):  aMIOdarone    Tablet 100 milliGRAM(s) Oral daily  apixaban 2.5 milliGRAM(s) Oral every 12 hours  furosemide    Tablet 20 milliGRAM(s) Oral two times a day  levothyroxine 125 MICROGram(s) Oral daily  metoprolol succinate ER 12.5 milliGRAM(s) Oral daily  pantoprazole    Tablet 40 milliGRAM(s) Oral two times a day  spironolactone 12.5 milliGRAM(s) Oral daily    MEDICATIONS  (PRN):  acetaminophen     Tablet .. 650 milliGRAM(s) Oral every 6 hours PRN Temp greater or equal to 38.5C (101.3F), Mild Pain (1 - 3)      Vital Signs Last 24 Hrs  T(C): 36.6 (13 Jul 2023 11:52), Max: 36.6 (12 Jul 2023 20:34)  T(F): 97.8 (13 Jul 2023 11:52), Max: 97.8 (12 Jul 2023 20:34)  HR: 59 (13 Jul 2023 11:52) (59 - 68)  BP: 100/62 (13 Jul 2023 11:52) (100/62 - 122/71)  BP(mean): --  RR: 18 (13 Jul 2023 11:52) (18 - 18)  SpO2: 97% (13 Jul 2023 11:52) (95% - 98%)    Parameters below as of 13 Jul 2023 11:52  Patient On (Oxygen Delivery Method): room air        PHYSICAL EXAM:  GENERAL: NAD, well-developed  HEAD:  Atraumatic, Normocephalic  EYES: EOMI, PERRLA, conjunctiva and sclera clear  NECK: Supple, No JVD  CHEST/LUNG: Clear to auscultation bilaterally; No wheeze  HEART: Regular rate and rhythm; No murmurs, rubs, or gallops  ABDOMEN: Soft, Nontender, Nondistended; Bowel sounds present  EXTREMITIES:  2+ Peripheral Pulses, No clubbing, cyanosis, or edema  PSYCH: AAOx3  NEUROLOGY: non-focal  SKIN: No rashes or lesions    LABS:    07-13    138  |  100  |  50<H>  ----------------------------<  92  4.0   |  25  |  1.48<H>    Ca    9.2      13 Jul 2023 06:15  Mg     2.7     07-13        CARDIAC MARKERS ( 12 Jul 2023 16:29 )  x     / x     / x     / x     / 3.8 ng/mL      Urinalysis Basic - ( 13 Jul 2023 06:15 )    Color: x / Appearance: x / SG: x / pH: x  Gluc: 92 mg/dL / Ketone: x  / Bili: x / Urobili: x   Blood: x / Protein: x / Nitrite: x   Leuk Esterase: x / RBC: x / WBC x   Sq Epi: x / Non Sq Epi: x / Bacteria: x    Telemetry No events      RADIOLOGY & ADDITIONAL TESTS:    Imaging Personally Reviewed:    Consultant(s) Notes Reviewed:      Care Discussed with Consultants/Other Providers:

## 2023-07-27 NOTE — ED PROVIDER NOTE - CHRONIC CONDITIONS AFFECTING CARE
Agree with dosing suggestion.    Thanks,     JESSICA Vieyra  [facility]  [facility] & [facility]       Chronic Conditions Affecting Care

## 2023-08-20 NOTE — DIETITIAN INITIAL EVALUATION ADULT - PERTINENT LABORATORY DATA
Plastic and Reconstructive Surgery Consult Note    REASON FOR VISIT    Hidradenitis suppurativa (HS) of left inguinal region.    HISTORY OF PRESENT ILLNESS  Jessica Floyd is a 57 year old female who presents to [facility] for evaluation.  She has a past medical history significant for HS of the left inguinal region. She has a persistent nodule in this regions that can intermittently be painful and drain purulent material.    Her current treatment regimen is spironolactone, topical clindamycin, and medicated washes.  She says this has helped some but she continues to have a nodule in the the left groin that is bothersome.     Smoking : No     Diabetes : Yes (Hb A1c 8.5 4/25/23)     Family History of Skin Cancer-  No      MEDICAL HISTORY  Past Medical History:   Diagnosis Date   • Allergic Rhinitis    • Cough 02/23/1999    chronic   • Diabetes mellitus (CMD) 9/25/2012   • GERD (esophageal reflux) 01/01/1997   • MASTOIDITIS 08/22/2001    left   • Other specified urticaria    • Sinusitis (recurrent)    • Unspecified essential hypertension 12/27/2010       SURGICAL HISTORY  Past Surgical History:   Procedure Laterality Date   • Mammo screening bilateral  2/19/2016    normal   • Screening mammography  03/04/2008    bilateral, results: category 1--negative       MEDICATIONS  Current Outpatient Medications   Medication Sig Dispense Refill   • Jardiance 25 MG tablet TAKE 1 TABLET BY MOUTH ONCE DAILY BEFORE BREAKFAST 90 tablet 3   • spironolactone (ALDACTONE) 50 MG tablet Take 1 tab nightly 90 tablet 3   • spironolactone (ALDACTONE) 100 MG tablet Take 1 tab in the morning 90 tablet 3   • clindamycin (Clindagel) 1 % gel Apply topically 2 times daily. Apply to affected areas of the groin 60 mL 6   • cephalexin (KEFLEX) 500 MG capsule Take 1 capsule by mouth in the am and 1 capsule in the evening for 10 days 20 capsule 2   • atorvastatin (LIPITOR) 40 MG tablet TAKE 1 TABLET DAILY 90 tablet 3   • Janumet   MG per tablet TAKE 1 TABLET TWICE DAILY  WITH MEALS 180 tablet 3   • lisinopril (ZESTRIL) 20 MG tablet TAKE 1 TABLET DAILY 90 tablet 3   • amLODIPine (NORVASC) 5 MG tablet TAKE 1 TABLET DAILY 90 tablet 3   • gabapentin (NEURONTIN) 100 MG capsule Use 1 capsule at onset of chest wall pain , may repeat every 6 hours as needed. 20 capsule 1   • triamcinolone (ARISTOCORT) 0.1 % cream Apply topically 2 times daily. 30 g 3   • Accu-Chek SmartView test strip Test blood sugar 2 times daily as directed. Diagnosis: E11.9. Meter: accu-chek smartview 150 each 2   • Cholecalciferol (VITAMIN D) 1000 UNITS capsule Take 2,000 Units by mouth daily.     • lansoprazole (PREVACID) 30 MG suspension Take 30 mg by mouth daily as needed. 30 each    • Multiple Vitamins-Minerals (MULTI FOR HER PO) Take  by mouth.       No current facility-administered medications for this visit.       ALLERGIES  ALLERGIES:   Allergen Reactions   • Covid-19 (Mrna) Vaccine Other (See Comments)     Persistence tinnitus    • Minocycline DIZZINESS   • Mold   (Environmental)    • Weeds        SOCIAL HISTORY  Social History     Socioeconomic History   • Marital status: /Civil Union     Spouse name: Italo Floyd   • Number of children: 2   • Years of education: 12+   • Highest education level: Not on file   Occupational History   • Occupation: Office work   Tobacco Use   • Smoking status: Never   • Smokeless tobacco: Never   Substance and Sexual Activity   • Alcohol use: Yes     Comment: social 5-6 week   • Drug use: No   • Sexual activity: Not on file   Other Topics Concern   • Not on file   Social History Narrative   • Not on file     Social Determinants of Health     Financial Resource Strain: Not on file   Food Insecurity: Not on file   Transportation Needs: Not on file   Physical Activity: Not on file   Stress: Not on file   Social Connections: Not on file   Intimate Partner Violence: Not on file        FAMILY HISTORY  Family History   Problem Relation  Age of Onset   • Hypertension Father    • Myocardial Infarction Father 53   • Diabetes Father         adult onset   • Hypertension Mother      REVIEW OF SYSTEMS  All pertinent positive and negative ROS (Review of Systems) as outlined in the HPI (History of Present Illess).  Constitutional: Negative for Fever, Weight loss or gain, loss of energy, difficulty performing chores  Eyes: Negative for Dry Eyes, Blurry Vision, or Double Vision  Ears, Nose, and Throat: Negative for Difficulty hearing, ringing in ears, runny nose, or sore throat  Cardiovascular: Negative for Rapid heart rate, Palpitations, Chest Pain, or Swollen ankles  Respiratory: Negative for Chronic cough, wheezing, or shortness of breath  Gastrointestinal: Negative for abdominal pain, diarrhea, constipation, blood in stool, heart burn, nausea, abdominal bloating  Musculoskeletal: Negative for multiple joint pains, chronic muscle aches, back pain, or neck pain  Skin/breast: Negative for breast pain, breast mass, skin lesion, or rash  Neurological: Negative for Numbness, Tingling, Seizures, or tremor  Mind: Negative   Endocrine: Negative for hot or cold intolerance, unexplained weight loss or gain, abnormal milk production  Hematologic/Lymphatic: Negative for easy bruising or bleeding, or swollen lymph nodes  Allergic/Immunologic: Negative     PHYSICAL EXAM  Ht Readings from Last 1 Encounters:   08/22/23 5' 2\" (1.575 m)     Wt Readings from Last 1 Encounters:   08/22/23 62 kg (136 lb 9.2 oz)     Body mass index is 24.98 kg/m².  Body surface area is 1.63 meters squared.  Visit Vitals  /69 (BP Location: RUE - Right upper extremity, Patient Position: Sitting, Cuff Size: Regular)   Pulse 89   Temp 98.3 °F (36.8 °C) (Oral)   Ht 5' 2\" (1.575 m)   Wt 62 kg (136 lb 9.2 oz)   SpO2 99%   BMI 24.98 kg/m²       General Exam - The patient appears her stated age in no apparent distress.  Neurologic - Alert and oriented to person, place, and time.   Psychiatric -  07-12    137  |  100  |  48<H>  ----------------------------<  95  4.5   |  25  |  1.58<H>    Ca    9.3      12 Jul 2023 06:08  Mg     3.1     07-12     Pleasant mood and affect.  Musculoskeletal - Ambulates without the use of an assistive device.    Extremities - No obvious weakness of the upper or lower extremities.    HENT - Normocephalic.  Atraumatic.  Bilateral external ears normal.   Neck - Normal range of motion.   Respiratory - No respiratory distress, breathing comfortably on room air.   Cardiac - Palpable pulses present.  Left Inguinal Exam - HS nodule is present.  No active drainage.       ASSESSMENT  Jessica Floyd is a 57 year old female who presents to [facility] Surgery Clinic for evaluation of left inguinal HS.  We discussed excision of the nodule in the clinic under local. Her most recent Hb A1c was 8.5.  She will need better glycemic control prior to surgery with a goal of 7 fort Hb A1c.    General risks associated with the procedure were discussed with the patient.  They include but are not limited  to infection, bleeding, wound healing problems, seroma, hematoma, damage to underlying structures, and need for re-operation.  All of her questions were answered and she confirmed understanding.      Once she has better control of her DM, she will call us to schedule.    PLAN  - She will need better glycemic control prior to her procedure with a goal of 7 fort Hb A1c.  - Plan for excision left inguinal HS in clinic under local  - Antibiotics will be given after the procedure  - Call with any questions or concerns in the interim    Lisa K Muchard, MD, thank you so much for allowing me the opportunity to assist you in the care of this patient.  Please contact me if you have any questions or concerns.  I look forward to being of further service to you and will continue to keep you informed of any and all subsequent developments in care.    Wenceslao Shukla MD.  Plastic and Reconstructive Surgery  [facility] [place]  Pager:  [phone]

## 2023-09-12 NOTE — PROVIDER CONTACT NOTE (OTHER) - RECOMMENDATIONS
BP was low throughout the night. Reschedule metoprolol, aldactone, & losartan administration to 8 AM.
Administer IV push Lasix. Move aldactone, metoprolol, & losartan to 8 AM.
Give Eliquis, hold all other open parameter medication.
Provider will assess pt.
Retake BP in 30 minutes.
Include Pregnancy/Lactation Warning?: No

## 2024-02-14 NOTE — DISCHARGE NOTE PROVIDER - PROVIDER TOKENS
PROVIDER:[TOKEN:[[id number]],FOLLOWUP:[1 week]] Initiate Treatment: Dapsone 7.5% gel apply a thin layer to face every morning Render In Strict Bullet Format?: No Detail Level: Zone Continue Regimen: Adapalene 0.3% gel to face qhs as tolerated. Refills routed. Plan: RTC in 3 months to re-evaluate

## 2024-03-26 ENCOUNTER — NON-APPOINTMENT (OUTPATIENT)
Age: 89
End: 2024-03-26

## 2024-06-22 NOTE — PATIENT PROFILE ADULT - CENTRAL VENOUS CATHETER/PICC LINE
Speech Therapy      Visit Type: Evaluation  -  Communication/cognition  Reason for consult: IRP Speech Eval/Treat    Relevant History/Co-morbidities: 6/22/24: SCCAN initiated  6/21/24: IRP Admit  6/20/24: discharge swallow   6/18/24: MoCA 8.1  6/17/24: Clinical swallow evaluation; informal com/cog evaluation   6/16/24: admitted to [facility] ED for fall; transferred to [facility]; CT head remarkable for 2.1 x 1.1 x 1.6 cm intraparenchymal hematoma involving the LEFT parafalcine parietal lobe. Subarachnoid hemorrhage in the LEFT parafalcine parietal lobe. 8 mm maximum thickness interhemispheric subdural hematoma. LEFT high parietal subdural hematoma. RIGHT temporal parietal subdural hematoma; chest x-ray remarkable for Infiltration and/or atelectasis RIGHT lung base. Small LEFT pleural effusion.     Pertinent medical hx: adenocarcinoma of left lung, bronchitis, severe COPD, duodenitis, iliac artery aneurysm, lower GI bleed, pneumonia, pulmonary embolism    SUBJECTIVE  Patient seen bedside for comm/cog assessment on [address]     Patient sitting up in chair, awake, alert, cooperative, no c/o mesha,    \"I feel like I'm getting back, closer to my normal but I am still a little off my normal\"      Functional Cognition:    - Expression is verbal.     - Following commands intact.      OBJECTIVE         Communication/Cognition  Orientation Level:    - Person: oriented    - Place: oriented    - Month: oriented    - Year: oriented    - Date: oriented    - Day of week: oriented    - Season: oriented    - Reason for hospitalization: oriented    - Length of stay: oriented  Expression-Verbal:     - Overall: minimal assist (completes 75-89%)     - Indicates needs (gesturally/verbally): intact (>90% accuracy)    - Structured sentence formulation: mild impairment (75-89% accuracy)    - Spontaneous words/phrases: intact (>90% accuracy)    - Conversational discourse: mild impairment (75-89% accuracy)    - Dysarthria: mild impairment (75-89%  accuracy)    - Conversation - Simple: intact (>90% accuracy)     - Conversation - Complex: mild impairment (75-89% accuracy)    -   SCCAN 78%  Naming:      - Overall: moderate assist (completes 50-74%)  and minimal assist (completes 75-89%)     - Divergent: moderate impairment (50-74% accuracy)    - Generative naming: moderate impairment (50-74% accuracy)    -   Moderate difficulty with both simple and complex generative naming  \"Well you put me on the spot you know\"    Auditory Comprehension:      - Overall: supervision (completes >90%)     - Commands 2 unit: intact (>90% accuracy)    - Commands 3 unit: intact (>90% accuracy)    - Yes/No questions 2 unit: intact (>90% accuracy)    - Yes/No questions 3 unit: intact (>90% accuracy)    - Yes/No questions multi unit: intact (>90% accuracy)    - Commands multi unit: mild impairment (75-89% accuracy)    -   SCCAN 92%        Test and Outcome Measures    Communication/cognition evaluation initiated with formal assessment, the Scales of Cognitive and Communicative Ability for Neurorehabilitation (SCCAN). The SCCAN is a research-based, norm-referenced method of assessing cognitive and communicative functioning. Scores were as follows:    Oral Expression (speech production, repetition, naming, connected speech, and conversational interaction): 78%    Orientation: 91%    Memory (immediate and delayed recall for both visual and verbal stimuli): TBD    Speech Comprehension (attention and perception, word comprehension, following directions, and comprehension of conversational interactions): 92    Reading Comprehension (attention and perception, matching, and contextual reading): TBD    Writing (attention, perception, and motor control, copying, writing to dictation, picture description, and functional written communication): TBD    Attention (mental control/discrimination): TBD    Problem Solving (planning, sequencing, organizing behavior, functional problem solving):  TBD      [facility] was unable to be completed due to the complexity of the assessment and due to the time constraints of the session however upon completion of the assessment a total raw score and a severity rating will be provided.          ASSESSMENT  Impairments: verbal expression  Functional Limitations: communication/cognition  Patient seen on [address] [place] [address] for communication and cognition assessment.   Patient is demonstrating mild impairments in verbal expression and generative naming however the [facility] cognitive assessment was unable to be completed due to the complexity of the assessment and due to the time constraints of the session however upon completion of the assessment a total raw score and a severity rating will be provided.        The patient is demonstrating good progress as evidenced by active participation in assessment and success with tasks/activities during session.  At this time, the patient continues to demonstrate mild impairments in verbal expression which limit the performance of communication/cognition in iADL's.  Patient is currently needing  mild cueing for communication/cognition in iADL's.      Discharge Recommendations  SLP Identified Barriers to Discharge: none with family support  Recommendation for Discharge Location: SLP WI: Pending functional progress         • Rehab Potential: excellent    Therapy Participation: This patient participated in all scheduled speech therapy time this session.    Education:   - Present and ready to learn: patient  Education provided during session:  - dysphagia and role of SLP  - Results of above outlined education: Verbalizes understanding and Needs reinforcement    Patient at end of session:    - location: in chair    - safety measures: alarm system in place/re-engaged, call light within reach, lines intact and equipment intact    - hand off to: nurse assistant    PLAN  SLP Frequency: X 30min 1/5 on 6/29 cc (6/22)  Duration: to be  determined at team conference    C/C: complete SCCAN.  Assess: Personal financials, medication management, time calculations. Address generative naming and complex verbal expression with details with minimal cues. Patient fully functional/independent PTA in all areas of cognition including driving     Interventions:  Communication/cognition therapy and patient/family education    Plan/Goal Agreement:  Patient agrees with goals and individualized plan of care      RECOMMENDATIONS     -Diet:          *Liquid- Thin and Regular    -Medication Administration:         *whole and patient preference      GOALS  Review date: 6/29/2024  Short Term Goals: to be met 7 days from date established, unless otherwise stated.  1. Verbalize thoughts, ideas and opinions with min cues for additional details @ 90%  2. Complete simple-mod complexity generative naming taks with min cues @ 90%    Long Term Goals: to be met by discharge from rehab program.  1.  Functional verbal expression for modified indep in iADL's      Documented in the chart in the following areas:    Assessment.      Therapy procedure time and total treatment time can be found documented on the Time Entry flowsheet   no

## 2024-09-04 ENCOUNTER — APPOINTMENT (OUTPATIENT)
Dept: GASTROENTEROLOGY | Facility: CLINIC | Age: 89
End: 2024-09-04

## 2025-02-03 NOTE — H&P ADULT - NSHPREVIEWOFSYSTEMS_GEN_ALL_CORE
Retrieved from:        
REVIEW OF SYSTEMS:    CONSTITUTIONAL: + weakness, no fevers or chills  EYES/ENT: No visual changes;  No vertigo or throat pain   NECK: No pain or stiffness  RESPIRATORY: No cough, wheezing, hemoptysis; + shortness of breath  CARDIOVASCULAR: No chest pain or palpitations  GASTROINTESTINAL: No abdominal or epigastric pain. No nausea, vomiting, or hematemesis; No diarrhea or constipation. No melena or hematochezia.  GENITOURINARY: No dysuria, frequency or hematuria  NEUROLOGICAL: No numbness or weakness  SKIN: No itching, burning, rashes, or lesions   All other review of systems is negative unless indicated above.

## 2025-02-05 ENCOUNTER — INPATIENT (INPATIENT)
Facility: HOSPITAL | Age: 89
LOS: 5 days | Discharge: HOME HEALTH | DRG: 189 | End: 2025-02-11
Attending: INTERNAL MEDICINE | Admitting: INTERNAL MEDICINE
Payer: MEDICARE

## 2025-02-05 VITALS
SYSTOLIC BLOOD PRESSURE: 98 MMHG | OXYGEN SATURATION: 96 % | DIASTOLIC BLOOD PRESSURE: 64 MMHG | HEIGHT: 62 IN | TEMPERATURE: 98 F | RESPIRATION RATE: 20 BRPM | WEIGHT: 119.93 LBS | HEART RATE: 75 BPM

## 2025-02-05 DIAGNOSIS — J10.1 INFLUENZA DUE TO OTHER IDENTIFIED INFLUENZA VIRUS WITH OTHER RESPIRATORY MANIFESTATIONS: ICD-10-CM

## 2025-02-05 DIAGNOSIS — I50.22 CHRONIC SYSTOLIC (CONGESTIVE) HEART FAILURE: ICD-10-CM

## 2025-02-05 DIAGNOSIS — I20.9 ANGINA PECTORIS, UNSPECIFIED: ICD-10-CM

## 2025-02-05 DIAGNOSIS — H25.019 CORTICAL AGE-RELATED CATARACT, UNSPECIFIED EYE: Chronic | ICD-10-CM

## 2025-02-05 DIAGNOSIS — I48.20 CHRONIC ATRIAL FIBRILLATION, UNSPECIFIED: ICD-10-CM

## 2025-02-05 DIAGNOSIS — E03.9 HYPOTHYROIDISM, UNSPECIFIED: ICD-10-CM

## 2025-02-05 DIAGNOSIS — J96.01 ACUTE RESPIRATORY FAILURE WITH HYPOXIA: ICD-10-CM

## 2025-02-05 LAB
ALBUMIN SERPL ELPH-MCNC: 4 G/DL — SIGNIFICANT CHANGE UP (ref 3.3–5)
ALP SERPL-CCNC: 57 U/L — SIGNIFICANT CHANGE UP (ref 40–120)
ALT FLD-CCNC: 18 U/L — SIGNIFICANT CHANGE UP (ref 10–45)
ANION GAP SERPL CALC-SCNC: 14 MMOL/L — SIGNIFICANT CHANGE UP (ref 5–17)
APTT BLD: 33.9 SEC — SIGNIFICANT CHANGE UP (ref 24.5–35.6)
AST SERPL-CCNC: 25 U/L — SIGNIFICANT CHANGE UP (ref 10–40)
BASOPHILS # BLD AUTO: 0.01 K/UL — SIGNIFICANT CHANGE UP (ref 0–0.2)
BASOPHILS NFR BLD AUTO: 0.2 % — SIGNIFICANT CHANGE UP (ref 0–2)
BILIRUB SERPL-MCNC: 0.2 MG/DL — SIGNIFICANT CHANGE UP (ref 0.2–1.2)
BUN SERPL-MCNC: 26 MG/DL — HIGH (ref 7–23)
CALCIUM SERPL-MCNC: 8.8 MG/DL — SIGNIFICANT CHANGE UP (ref 8.4–10.5)
CHLORIDE SERPL-SCNC: 100 MMOL/L — SIGNIFICANT CHANGE UP (ref 96–108)
CO2 SERPL-SCNC: 23 MMOL/L — SIGNIFICANT CHANGE UP (ref 22–31)
CREAT SERPL-MCNC: 1.28 MG/DL — SIGNIFICANT CHANGE UP (ref 0.5–1.3)
EGFR: 40 ML/MIN/1.73M2 — LOW
EOSINOPHIL # BLD AUTO: 0.02 K/UL — SIGNIFICANT CHANGE UP (ref 0–0.5)
EOSINOPHIL NFR BLD AUTO: 0.3 % — SIGNIFICANT CHANGE UP (ref 0–6)
FLUAV AG NPH QL: DETECTED
FLUBV AG NPH QL: SIGNIFICANT CHANGE UP
GAS PNL BLDV: SIGNIFICANT CHANGE UP
GLUCOSE SERPL-MCNC: 109 MG/DL — HIGH (ref 70–99)
HCT VFR BLD CALC: 39.8 % — SIGNIFICANT CHANGE UP (ref 34.5–45)
HGB BLD-MCNC: 12.6 G/DL — SIGNIFICANT CHANGE UP (ref 11.5–15.5)
IMM GRANULOCYTES NFR BLD AUTO: 0.6 % — SIGNIFICANT CHANGE UP (ref 0–0.9)
INR BLD: 1.28 RATIO — HIGH (ref 0.85–1.16)
LYMPHOCYTES # BLD AUTO: 0.65 K/UL — LOW (ref 1–3.3)
LYMPHOCYTES # BLD AUTO: 10 % — LOW (ref 13–44)
MCHC RBC-ENTMCNC: 28.3 PG — SIGNIFICANT CHANGE UP (ref 27–34)
MCHC RBC-ENTMCNC: 31.7 G/DL — LOW (ref 32–36)
MCV RBC AUTO: 89.2 FL — SIGNIFICANT CHANGE UP (ref 80–100)
MONOCYTES # BLD AUTO: 0.65 K/UL — SIGNIFICANT CHANGE UP (ref 0–0.9)
MONOCYTES NFR BLD AUTO: 10 % — SIGNIFICANT CHANGE UP (ref 2–14)
NEUTROPHILS # BLD AUTO: 5.16 K/UL — SIGNIFICANT CHANGE UP (ref 1.8–7.4)
NEUTROPHILS NFR BLD AUTO: 78.9 % — HIGH (ref 43–77)
NRBC # BLD: 0 /100 WBCS — SIGNIFICANT CHANGE UP (ref 0–0)
NRBC BLD-RTO: 0 /100 WBCS — SIGNIFICANT CHANGE UP (ref 0–0)
NT-PROBNP SERPL-SCNC: 5398 PG/ML — HIGH (ref 0–300)
PLATELET # BLD AUTO: 178 K/UL — SIGNIFICANT CHANGE UP (ref 150–400)
POTASSIUM SERPL-MCNC: 3.6 MMOL/L — SIGNIFICANT CHANGE UP (ref 3.5–5.3)
POTASSIUM SERPL-SCNC: 3.6 MMOL/L — SIGNIFICANT CHANGE UP (ref 3.5–5.3)
PROCALCITONIN SERPL-MCNC: 0.16 NG/ML — HIGH (ref 0.02–0.1)
PROT SERPL-MCNC: 7.5 G/DL — SIGNIFICANT CHANGE UP (ref 6–8.3)
PROTHROM AB SERPL-ACNC: 14.7 SEC — HIGH (ref 9.9–13.4)
RBC # BLD: 4.46 M/UL — SIGNIFICANT CHANGE UP (ref 3.8–5.2)
RBC # FLD: 15.7 % — HIGH (ref 10.3–14.5)
RSV RNA NPH QL NAA+NON-PROBE: SIGNIFICANT CHANGE UP
SARS-COV-2 RNA SPEC QL NAA+PROBE: SIGNIFICANT CHANGE UP
SODIUM SERPL-SCNC: 137 MMOL/L — SIGNIFICANT CHANGE UP (ref 135–145)
TROPONIN T, HIGH SENSITIVITY RESULT: 45 NG/L — SIGNIFICANT CHANGE UP (ref 0–51)
TROPONIN T, HIGH SENSITIVITY RESULT: 47 NG/L — SIGNIFICANT CHANGE UP (ref 0–51)
WBC # BLD: 6.53 K/UL — SIGNIFICANT CHANGE UP (ref 3.8–10.5)
WBC # FLD AUTO: 6.53 K/UL — SIGNIFICANT CHANGE UP (ref 3.8–10.5)

## 2025-02-05 PROCEDURE — 71045 X-RAY EXAM CHEST 1 VIEW: CPT | Mod: 26

## 2025-02-05 PROCEDURE — 99223 1ST HOSP IP/OBS HIGH 75: CPT

## 2025-02-05 PROCEDURE — 99285 EMERGENCY DEPT VISIT HI MDM: CPT

## 2025-02-05 PROCEDURE — 71250 CT THORAX DX C-: CPT | Mod: 26

## 2025-02-05 RX ORDER — BACTERIOSTATIC SODIUM CHLORIDE 0.9 %
500 VIAL (ML) INJECTION ONCE
Refills: 0 | Status: COMPLETED | OUTPATIENT
Start: 2025-02-05 | End: 2025-02-05

## 2025-02-05 RX ORDER — AMIODARONE HYDROCHLORIDE 50 MG/ML
100 INJECTION, SOLUTION INTRAVENOUS DAILY
Refills: 0 | Status: DISCONTINUED | OUTPATIENT
Start: 2025-02-05 | End: 2025-02-11

## 2025-02-05 RX ORDER — OSELTAMIVIR PHOSPHATE 75 MG/1
30 CAPSULE ORAL DAILY
Refills: 0 | Status: COMPLETED | OUTPATIENT
Start: 2025-02-05 | End: 2025-02-10

## 2025-02-05 RX ORDER — METOPROLOL SUCCINATE 25 MG
12.5 TABLET, EXTENDED RELEASE 24 HR ORAL DAILY
Refills: 0 | Status: DISCONTINUED | OUTPATIENT
Start: 2025-02-05 | End: 2025-02-11

## 2025-02-05 RX ORDER — BACTERIOSTATIC SODIUM CHLORIDE 0.9 %
1000 VIAL (ML) INJECTION
Refills: 0 | Status: DISCONTINUED | OUTPATIENT
Start: 2025-02-05 | End: 2025-02-11

## 2025-02-05 RX ORDER — AZITHROMYCIN DIHYDRATE 500 MG/1
500 TABLET, FILM COATED ORAL EVERY 24 HOURS
Refills: 0 | Status: COMPLETED | OUTPATIENT
Start: 2025-02-05 | End: 2025-02-07

## 2025-02-05 RX ORDER — APIXABAN 5 MG/1
2.5 TABLET, FILM COATED ORAL EVERY 12 HOURS
Refills: 0 | Status: DISCONTINUED | OUTPATIENT
Start: 2025-02-05 | End: 2025-02-11

## 2025-02-05 RX ORDER — CEFTRIAXONE 250 MG/1
1000 INJECTION, POWDER, FOR SOLUTION INTRAMUSCULAR; INTRAVENOUS ONCE
Refills: 0 | Status: COMPLETED | OUTPATIENT
Start: 2025-02-05 | End: 2025-02-05

## 2025-02-05 RX ORDER — LEVOTHYROXINE SODIUM 25 UG/1
1 TABLET ORAL
Refills: 0 | DISCHARGE

## 2025-02-05 RX ORDER — MAGNESIUM, ALUMINUM HYDROXIDE 200-225/5
30 SUSPENSION, ORAL (FINAL DOSE FORM) ORAL ONCE
Refills: 0 | Status: COMPLETED | OUTPATIENT
Start: 2025-02-05 | End: 2025-02-11

## 2025-02-05 RX ORDER — ACETAMINOPHEN 160 MG/5ML
1000 SUSPENSION ORAL ONCE
Refills: 0 | Status: COMPLETED | OUTPATIENT
Start: 2025-02-05 | End: 2025-02-05

## 2025-02-05 RX ORDER — ACETAMINOPHEN, DIPHENHYDRAMINE HCL, PHENYLEPHRINE HCL 325; 25; 5 MG/1; MG/1; MG/1
3 TABLET ORAL AT BEDTIME
Refills: 0 | Status: DISCONTINUED | OUTPATIENT
Start: 2025-02-05 | End: 2025-02-11

## 2025-02-05 RX ORDER — LEVOTHYROXINE SODIUM 25 UG/1
112 TABLET ORAL DAILY
Refills: 0 | Status: DISCONTINUED | OUTPATIENT
Start: 2025-02-05 | End: 2025-02-11

## 2025-02-05 RX ORDER — PANTOPRAZOLE 20 MG/1
40 TABLET, DELAYED RELEASE ORAL
Refills: 0 | Status: DISCONTINUED | OUTPATIENT
Start: 2025-02-05 | End: 2025-02-11

## 2025-02-05 RX ORDER — OSELTAMIVIR PHOSPHATE 75 MG/1
75 CAPSULE ORAL
Refills: 0 | Status: DISCONTINUED | OUTPATIENT
Start: 2025-02-05 | End: 2025-02-05

## 2025-02-05 RX ORDER — ACETAMINOPHEN 160 MG/5ML
650 SUSPENSION ORAL EVERY 6 HOURS
Refills: 0 | Status: DISCONTINUED | OUTPATIENT
Start: 2025-02-05 | End: 2025-02-11

## 2025-02-05 RX ORDER — CEFTRIAXONE 250 MG/1
1000 INJECTION, POWDER, FOR SOLUTION INTRAMUSCULAR; INTRAVENOUS EVERY 24 HOURS
Refills: 0 | Status: COMPLETED | OUTPATIENT
Start: 2025-02-05 | End: 2025-02-09

## 2025-02-05 RX ORDER — SUCRALFATE 1 G/10ML
1 SUSPENSION ORAL
Refills: 0 | Status: DISCONTINUED | OUTPATIENT
Start: 2025-02-05 | End: 2025-02-11

## 2025-02-05 RX ORDER — AZITHROMYCIN DIHYDRATE 500 MG/1
500 TABLET, FILM COATED ORAL ONCE
Refills: 0 | Status: COMPLETED | OUTPATIENT
Start: 2025-02-05 | End: 2025-02-05

## 2025-02-05 RX ORDER — ONDANSETRON 4 MG/1
4 TABLET, ORALLY DISINTEGRATING ORAL EVERY 8 HOURS
Refills: 0 | Status: DISCONTINUED | OUTPATIENT
Start: 2025-02-05 | End: 2025-02-11

## 2025-02-05 RX ORDER — FAMOTIDINE 10 MG/ML
1 INJECTION INTRAVENOUS
Refills: 0 | DISCHARGE

## 2025-02-05 RX ADMIN — AZITHROMYCIN DIHYDRATE 250 MILLIGRAM(S): 500 TABLET, FILM COATED ORAL at 20:59

## 2025-02-05 RX ADMIN — Medication 40 MILLIGRAM(S): at 23:28

## 2025-02-05 RX ADMIN — OSELTAMIVIR PHOSPHATE 30 MILLIGRAM(S): 75 CAPSULE ORAL at 21:38

## 2025-02-05 RX ADMIN — Medication 500 MILLILITER(S): at 22:32

## 2025-02-05 RX ADMIN — CEFTRIAXONE 100 MILLIGRAM(S): 250 INJECTION, POWDER, FOR SOLUTION INTRAMUSCULAR; INTRAVENOUS at 20:01

## 2025-02-05 RX ADMIN — Medication 500 MILLILITER(S): at 21:37

## 2025-02-05 RX ADMIN — ACETAMINOPHEN 400 MILLIGRAM(S): 160 SUSPENSION ORAL at 20:01

## 2025-02-05 NOTE — ED PROVIDER NOTE - CARE PLAN
Principal Discharge DX:	Acute hypoxic respiratory failure  Secondary Diagnosis:	Influenza A  Secondary Diagnosis:	Pneumonia due to influenza A virus   1

## 2025-02-05 NOTE — H&P ADULT - ASSESSMENT
89F pmh HFrEF(35%), Afib on eliquis, CRT-D, HTN, hypothyroid s/p recent bout of URI/pneumonia s/p course oral antibiotics p/w recurrent sx and CP found to be Flu A+ a/f flu tx and CP w/u

## 2025-02-05 NOTE — H&P ADULT - PROBLEM SELECTOR PLAN 4
- Metoprolol  - Amiodarone   - Eliquis  - Telemetry - Metoprolol  - Amiodarone   - Eliquis   - Telemetry

## 2025-02-05 NOTE — H&P ADULT - PROBLEM SELECTOR PLAN 2
-  WBC 6.53, Procal 0.16  - RVP: Influenza A+  - CXR: no acute cardiopulmonary disease  - CT chest: b/L GGO & patchy consolidation    - Tamiflu 75mg BID x 5d (2/5  - ??)   - Given persistence of sx in this high risk pt C/w IV Ceftriaxone and Azithromycin for possible supper imposed CAP  - Check: Ur Legionella, Ur Mycoplasma, S. Pneumo, H. flu  - F/u Bcx   - Supportive care   - Supplemental O2 prn  - Precaution per unit protocol (Droplet precx)

## 2025-02-05 NOTE — H&P ADULT - NSHPPHYSICALEXAM_GEN_ALL_CORE
T(C): 36.7 (02-05-25 @ 23:29), Max: 38.3 (02-05-25 @ 19:30)  HR: 88 (02-05-25 @ 23:29) (75 - 88)  BP: 100/68 (02-05-25 @ 23:29) (98/64 - 114/71)  RR: 19 (02-05-25 @ 23:29) (19 - 22)  SpO2: 97% (02-05-25 @ 23:29) (93% - 97%)    CONSTITUTIONAL: Well groomed, no apparent distress  EYES: PERRLA , EOMI  ENMT: MMM. Normal dentition  RESP: No respiratory distress, CTA b/l, 2L NC   CV: +S1S2, RRR, no MRG  GI: Soft, NTND  MSK: spontaneously moves all extremities   NEURO:  No focal deficits  PSYCH: A+O x 3, mood and affect appropriate

## 2025-02-05 NOTE — H&P ADULT - NSHPLABSRESULTS_GEN_ALL_CORE
12.6   6.53  )-----------( 178      ( 05 Feb 2025 15:52 )             39.8       02-05    137  |  100  |  26[H]  ----------------------------<  109[H]  3.6   |  23  |  1.28    Ca    8.8      05 Feb 2025 15:52    TPro  7.5  /  Alb  4.0  /  TBili  0.2  /  DBili  x   /  AST  25  /  ALT  18  /  AlkPhos  57  02-05      Troponin T, High Sensitivity Result: 45: ng/L (02.05.25 @ 15:52)  Troponin T, High Sensitivity Result: 47: ng/L (02.05.25 @ 18:49)  Pro-Brain Natriuretic Peptide: 5398 pg/mL (02.05.25 @ 15:52)  Procalcitonin: 0.16: ng/mL (02.05.25 @ 15:52)    FluA/FluB/RSV/COVID PCR (02.05.25 @ 15:51)    SARS-CoV-2 Result: [place]    Influenza A Result: Detected    Influenza B Result: [employer]    Resp Syn Virus Result: NotDetec      - - - - - - - - - - - - - - - - - - - - - - - - - - - - - - - - - - - - - - - - - - - - - - - - - - - -       EKG PERSONALLY REVIEWED:  Vpaced 77 bpm     IMAGES PERSONALLY REVIEWED:     < from: Xray Chest 1 View AP/PA (02.05.25 @ 16:18) >  IMPRESSION:  No radiographic evidence of acute cardiopulmonary disease.    < from: CT Chest No Cont (02.05.25 @ 18:11) >  IMPRESSION:  1.  Bilateral ground glass opacities and septal thickening are of   uncertain etiology.  2.  Bilateral patchy consolidation, some which are branching in the lower   lobes are of uncertain etiology.  3.  The distal esophagus is thickened.  4.  CT chest without contrast in 8 weeks is recommended to evaluate for   resolution/change.

## 2025-02-05 NOTE — ED PROVIDER NOTE - ATTENDING CONTRIBUTION TO CARE
This is a 89-year-old female with multiple medical problems most notable for heart failure anticoagulation pacemaker in place thyroid condition on amiodarone who is coming in after recent bout of URI/pneumonia and she was on oral antibiotics.  Symptoms resumed few days ago and today she developed lower chest discomfort about 2 cm above her lower border of the sternum.  She does note that since the beginning of the day today she can barely keep any food down because everything she eats even liquids hurts her substantially.    Awake alert talking no acute distress.  Regular rate and rhythm left lung is clear right lung with crackles/Rales/wheezes in the midlung field.  Abdomen is nontender no rebound no guarding no leg edema no JVD  Concern for esophageal rupture versus Boerhaave syndrome versus pneumonia.  Chest x-ray stat, CT chest with IV contrast.  CBC CMP and anticipate the patient will require admission.  EKG in my independent interpretation of the tracing is paced with no STEMI at a rate of 77

## 2025-02-05 NOTE — ED ADULT NURSE NOTE - NSFALLHARMRISKINTERV_ED_ALL_ED

## 2025-02-05 NOTE — H&P ADULT - HISTORY OF PRESENT ILLNESS
89F pmh HFrEF(35%), Afib on eliquis, CRT-D, HTN, hypothyroid coming in after recent bout of URI/pneumonia s/p course oral antibiotics.  Symptoms resumed few days ago and today she developed lower chest discomfort about 2 cm above her lower border of the sternum. Now unable to tolerate PO, everything she eats even liquids hurts her substantially.     ROS: Denies HA, SOB, palpitation, N/V/D, fever, dizziness, recent travel, sick contact, change in bowel or urinary habits   A 10-system ROS was performed and is negative except as noted above and/or in the HPI.    ED: RVP: Flu A+, CT chest b/l GGO, given Ctx, Azithromycin, Tamiflu in ED

## 2025-02-05 NOTE — H&P ADULT - PROBLEM SELECTOR PLAN 1
- P/w CP iso flu like sx, found to be Flu A+  - EKG: Vpaced   - Trop:  47 > 45,  BNP: 5398 ( hx/o CHF rEF w/ acute illness)     - CXR: no acute cardiopulmonary disease  - CT chest: b/l GGO & patchy consolidation    - ACS unlikely. CP could me 2/2 acute URI but pt high risk and will benefit from further cardiac eval   - Check echo (ordered)   - Tele monitoring   - if pt with new or worsening chest pain, stat trop/ekg, call cardiology. - P/w CP iso flu like sx, found to be Flu A+  - EKG: Vpaced   - Trop:  47 > 45,  BNP: 5398 ( hx/o CHF rEF w/ acute illness)     - CXR: no acute cardiopulmonary disease  - CT chest: b/l GGO & patchy consolidation    - ACS unlikely. CP could me 2/2 acute URI but pt high risk and will benefit from further cardiac eval   - Check echo (ordered)   - Tele monitoring   - if pt with new or worsening chest pain, stat trop/ekg, call cardiology  - Chest discomfort could also be component of GERD as pt reports discomfort in " lower chest / esophagus" with intake. Will given pantoprazole, Carafate. If sx persist consider CT A/P, GI eval

## 2025-02-05 NOTE — H&P ADULT - PROBLEM SELECTOR PLAN 3
- Metoprolol  - Lasix  - Spironolactone - IV Lasix  - Metoprolol  - Spironolactone  - Jardiance to resume when off IV diuretics   - I&O, daily wt   - telemetry

## 2025-02-05 NOTE — ED ADULT NURSE NOTE - OBJECTIVE STATEMENT
89y Female PMH CHF, CRT defibrillator/pacemaker on Eliquis present to ED for lower chest pain with a burning sensationb in her throat when she swallows and cough since this morning. Pt AAOx3, aid at bedside, ambulates with a walker at home, states she had a fever yesterday but no fever today, denies n/v, chills, shortness of breath, numbness tingling, or dizziness

## 2025-02-05 NOTE — ED ADULT NURSE NOTE - CHPI ED NUR SYMPTOMS NEG
no back pain/no chills/no diaphoresis/no dizziness/no nausea/no shortness of breath/no syncope/no vomiting

## 2025-02-06 ENCOUNTER — RESULT REVIEW (OUTPATIENT)
Age: 89
End: 2025-02-06

## 2025-02-06 DIAGNOSIS — I50.9 HEART FAILURE, UNSPECIFIED: ICD-10-CM

## 2025-02-06 DIAGNOSIS — J18.9 PNEUMONIA, UNSPECIFIED ORGANISM: ICD-10-CM

## 2025-02-06 LAB
ANION GAP SERPL CALC-SCNC: 15 MMOL/L — SIGNIFICANT CHANGE UP (ref 5–17)
BUN SERPL-MCNC: 20 MG/DL — SIGNIFICANT CHANGE UP (ref 7–23)
CALCIUM SERPL-MCNC: 8.1 MG/DL — LOW (ref 8.4–10.5)
CHLORIDE SERPL-SCNC: 102 MMOL/L — SIGNIFICANT CHANGE UP (ref 96–108)
CO2 SERPL-SCNC: 25 MMOL/L — SIGNIFICANT CHANGE UP (ref 22–31)
CREAT SERPL-MCNC: 1.06 MG/DL — SIGNIFICANT CHANGE UP (ref 0.5–1.3)
EGFR: 51 ML/MIN/1.73M2 — LOW
FLUAV H1 2009 PAND RNA SPEC QL NAA+PROBE: DETECTED
GLUCOSE SERPL-MCNC: 85 MG/DL — SIGNIFICANT CHANGE UP (ref 70–99)
HCT VFR BLD CALC: 35.4 % — SIGNIFICANT CHANGE UP (ref 34.5–45)
HGB BLD-MCNC: 11.2 G/DL — LOW (ref 11.5–15.5)
INR BLD: 1.23 RATIO — HIGH (ref 0.85–1.16)
LEGIONELLA AG UR QL: NEGATIVE — SIGNIFICANT CHANGE UP
MAGNESIUM SERPL-MCNC: 2.2 MG/DL — SIGNIFICANT CHANGE UP (ref 1.6–2.6)
MCHC RBC-ENTMCNC: 28.6 PG — SIGNIFICANT CHANGE UP (ref 27–34)
MCHC RBC-ENTMCNC: 31.6 G/DL — LOW (ref 32–36)
MCV RBC AUTO: 90.3 FL — SIGNIFICANT CHANGE UP (ref 80–100)
NRBC # BLD: 0 /100 WBCS — SIGNIFICANT CHANGE UP (ref 0–0)
NRBC BLD-RTO: 0 /100 WBCS — SIGNIFICANT CHANGE UP (ref 0–0)
PLATELET # BLD AUTO: 161 K/UL — SIGNIFICANT CHANGE UP (ref 150–400)
POTASSIUM SERPL-MCNC: 2.9 MMOL/L — CRITICAL LOW (ref 3.5–5.3)
POTASSIUM SERPL-SCNC: 2.9 MMOL/L — CRITICAL LOW (ref 3.5–5.3)
PROTHROM AB SERPL-ACNC: 14.1 SEC — HIGH (ref 9.9–13.4)
RAPID RVP RESULT: DETECTED
RBC # BLD: 3.92 M/UL — SIGNIFICANT CHANGE UP (ref 3.8–5.2)
RBC # FLD: 15.9 % — HIGH (ref 10.3–14.5)
SARS-COV-2 RNA SPEC QL NAA+PROBE: SIGNIFICANT CHANGE UP
SODIUM SERPL-SCNC: 142 MMOL/L — SIGNIFICANT CHANGE UP (ref 135–145)
WBC # BLD: 5.5 K/UL — SIGNIFICANT CHANGE UP (ref 3.8–10.5)
WBC # FLD AUTO: 5.5 K/UL — SIGNIFICANT CHANGE UP (ref 3.8–10.5)

## 2025-02-06 PROCEDURE — 93306 TTE W/DOPPLER COMPLETE: CPT | Mod: 26

## 2025-02-06 RX ORDER — IPRATROPIUM BROMIDE AND ALBUTEROL SULFATE .5; 2.5 MG/3ML; MG/3ML
3 SOLUTION RESPIRATORY (INHALATION) EVERY 6 HOURS
Refills: 0 | Status: DISCONTINUED | OUTPATIENT
Start: 2025-02-06 | End: 2025-02-11

## 2025-02-06 RX ORDER — POTASSIUM CHLORIDE 750 MG/1
40 TABLET, EXTENDED RELEASE ORAL EVERY 4 HOURS
Refills: 0 | Status: COMPLETED | OUTPATIENT
Start: 2025-02-06 | End: 2025-02-06

## 2025-02-06 RX ADMIN — Medication 100 MILLIGRAM(S): at 17:10

## 2025-02-06 RX ADMIN — Medication 12.5 MILLIGRAM(S): at 05:58

## 2025-02-06 RX ADMIN — ACETAMINOPHEN 650 MILLIGRAM(S): 160 SUSPENSION ORAL at 17:21

## 2025-02-06 RX ADMIN — APIXABAN 2.5 MILLIGRAM(S): 5 TABLET, FILM COATED ORAL at 17:08

## 2025-02-06 RX ADMIN — OSELTAMIVIR PHOSPHATE 30 MILLIGRAM(S): 75 CAPSULE ORAL at 10:16

## 2025-02-06 RX ADMIN — POTASSIUM CHLORIDE 40 MILLIEQUIVALENT(S): 750 TABLET, EXTENDED RELEASE ORAL at 10:15

## 2025-02-06 RX ADMIN — SUCRALFATE 1 GRAM(S): 1 SUSPENSION ORAL at 17:08

## 2025-02-06 RX ADMIN — POTASSIUM CHLORIDE 40 MILLIEQUIVALENT(S): 750 TABLET, EXTENDED RELEASE ORAL at 08:47

## 2025-02-06 RX ADMIN — AMIODARONE HYDROCHLORIDE 100 MILLIGRAM(S): 50 INJECTION, SOLUTION INTRAVENOUS at 06:28

## 2025-02-06 RX ADMIN — AZITHROMYCIN DIHYDRATE 250 MILLIGRAM(S): 500 TABLET, FILM COATED ORAL at 23:00

## 2025-02-06 RX ADMIN — PANTOPRAZOLE 40 MILLIGRAM(S): 20 TABLET, DELAYED RELEASE ORAL at 05:59

## 2025-02-06 RX ADMIN — ACETAMINOPHEN 650 MILLIGRAM(S): 160 SUSPENSION ORAL at 17:09

## 2025-02-06 RX ADMIN — APIXABAN 2.5 MILLIGRAM(S): 5 TABLET, FILM COATED ORAL at 05:58

## 2025-02-06 RX ADMIN — Medication 100 MILLIGRAM(S): at 23:43

## 2025-02-06 RX ADMIN — IPRATROPIUM BROMIDE AND ALBUTEROL SULFATE 3 MILLILITER(S): .5; 2.5 SOLUTION RESPIRATORY (INHALATION) at 17:10

## 2025-02-06 RX ADMIN — LEVOTHYROXINE SODIUM 112 MICROGRAM(S): 25 TABLET ORAL at 05:58

## 2025-02-06 RX ADMIN — Medication 40 MILLIGRAM(S): at 12:04

## 2025-02-06 RX ADMIN — Medication 100 MILLIGRAM(S): at 12:04

## 2025-02-06 RX ADMIN — IPRATROPIUM BROMIDE AND ALBUTEROL SULFATE 3 MILLILITER(S): .5; 2.5 SOLUTION RESPIRATORY (INHALATION) at 23:43

## 2025-02-06 RX ADMIN — IPRATROPIUM BROMIDE AND ALBUTEROL SULFATE 3 MILLILITER(S): .5; 2.5 SOLUTION RESPIRATORY (INHALATION) at 12:04

## 2025-02-06 RX ADMIN — SUCRALFATE 1 GRAM(S): 1 SUSPENSION ORAL at 05:59

## 2025-02-06 NOTE — CONSULT NOTE ADULT - NS ATTEND AMEND GEN_ALL_CORE FT
Patient care and plan discussed and reviewed with Advanced Care Provider. Plan as outlined above edited by me to reflect our discussion.   In addition, I participated in    - Ordering, reviewing, and interpreting labs, testing, and imaging.  - Reviewing prior hospitalization and where necessary, outpatient records.  - Counselling and educating patient and/or family regarding interpretation of aforementioned items and plan of care.
-Continue Tamiflu  -Start Duoneb q6h  -Start Robitussin q6h  -Continue abx  -Wean fio2 as able keep sat>90%

## 2025-02-06 NOTE — PATIENT PROFILE ADULT - FALL HARM RISK - HARM RISK INTERVENTIONS

## 2025-02-06 NOTE — PROGRESS NOTE ADULT - SUBJECTIVE AND OBJECTIVE BOX
Name of Patient : ANISA LUCIANO  MRN: [id number]  Date of visit: 02-06-25       Subjective: Patient seen and examined. No new events except as noted.   doing okay   improved breathing     REVIEW OF SYSTEMS:    CONSTITUTIONAL: No weakness, fevers or chills  EYES/ENT: No visual changes;  No vertigo or throat pain   NECK: No pain or stiffness  RESPIRATORY: No cough, wheezing, hemoptysis; No shortness of breath  CARDIOVASCULAR: No chest pain or palpitations  GASTROINTESTINAL: No abdominal or epigastric pain. No nausea, vomiting, or hematemesis; No diarrhea or constipation. No melena or hematochezia.  GENITOURINARY: No dysuria, frequency or hematuria  NEUROLOGICAL: No numbness or weakness  SKIN: No itching, burning, rashes, or lesions   All other review of systems is negative unless indicated above.    MEDICATIONS:  MEDICATIONS  (STANDING):  albuterol/ipratropium for Nebulization 3 milliLiter(s) Nebulizer every 6 hours  aluminum hydroxide/magnesium hydroxide/simethicone Suspension 30 milliLiter(s) Oral once  aMIOdarone    Tablet 100 milliGRAM(s) Oral daily  apixaban 2.5 milliGRAM(s) Oral every 12 hours  azithromycin  IVPB 500 milliGRAM(s) IV Intermittent every 24 hours  cefTRIAXone   IVPB 1000 milliGRAM(s) IV Intermittent every 24 hours  guaiFENesin Oral Liquid (Sugar-Free) 100 milliGRAM(s) Oral every 6 hours  levothyroxine 112 MICROGram(s) Oral daily  metoprolol succinate ER 12.5 milliGRAM(s) Oral daily  oseltamivir 30 milliGRAM(s) Oral daily  pantoprazole    Tablet 40 milliGRAM(s) Oral before breakfast  sodium chloride 0.9%. 1000 milliLiter(s) (50 mL/Hr) IV Continuous <Continuous>  spironolactone 12.5 milliGRAM(s) Oral daily  sucralfate 1 Gram(s) Oral two times a day      PHYSICAL EXAM:  T(C): 36.8 (02-06-25 @ 20:38), Max: 36.9 (02-06-25 @ 04:45)  HR: 68 (02-06-25 @ 20:38) (62 - 88)  BP: 104/68 (02-06-25 @ 20:38) (93/58 - 104/68)  RR: 17 (02-06-25 @ 20:38) (17 - 20)  SpO2: 97% (02-06-25 @ 20:38) (96% - 100%)  Wt(kg): --  I&O's Summary        Appearance: Normal	  HEENT:  PERRLA   Lymphatic: No lymphadenopathy   Cardiovascular: Normal S1 S2, no JVD  Respiratory: normal effort , clear  Gastrointestinal:  Soft, Non-tender  Skin: No rashes,  warm to touch  Psychiatry:  Mood & affect appropriate  Musculuskeletal: No edema    recent labs, Imaging and EKGs personally reviewed   CODE status discussed with the patient in detail                          11.2   5.50  )-----------( 161      ( 06 Feb 2025 06:34 )             35.4               02-06    142  |  102  |  20  ----------------------------<  85  2.9[LL]   |  25  |  1.06    Ca    8.1[L]      06 Feb 2025 06:34  Mg     2.2     02-06    TPro  7.5  /  Alb  4.0  /  TBili  0.2  /  DBili  x   /  AST  25  /  ALT  18  /  AlkPhos  57  02-05    PT/INR - ( 06 Feb 2025 06:34 )   PT: 14.1 sec;   INR: 1.23 ratio         PTT - ( 05 Feb 2025 15:52 )  PTT:33.9 sec                   Urinalysis Basic - ( 06 Feb 2025 06:34 )    Color: x / Appearance: x / SG: x / pH: x  Gluc: 85 mg/dL / Ketone: x  / Bili: x / Urobili: x   Blood: x / Protein: x / Nitrite: x   Leuk Esterase: x / RBC: x / WBC x   Sq Epi: x / Non Sq Epi: x / Bacteria: x

## 2025-02-06 NOTE — CONSULT NOTE ADULT - ASSESSMENT
89 y/o F with PMH of HFrEF (35%), Afib on Eliquis CRT-D, HTN, hypothyroid. Presents after recent bout of URI - pt states she was seen at urgent care a few days prior to admission and was negative for the flu. She states her CXR had no PNA. Her cough persisted, had fevers as an outpatient then she developed lower chest discomfort about 2 cm above her lower border of the sternum. Now unable to tolerate PO, everything she eats even liquids hurts her substantially. She states she is now able to tolerate PO intake. RVP +influenza A, CT chest with PNA.

## 2025-02-06 NOTE — PHYSICAL THERAPY INITIAL EVALUATION ADULT - PERTINENT HX OF CURRENT PROBLEM, REHAB EVAL
89F pmh HFrEF(35%), Afib on eliquis, CRT-D, HTN, hypothyroid coming in after recent bout of URI/pneumonia s/p course oral antibiotics.  Symptoms resumed few days ago and today she developed lower chest discomfort about 2 cm above her lower border of the sternum. Now unable to tolerate PO, everything she eats even liquids hurts her substantially.   CXR: No radiographic evidence of acute cardiopulmonary disease.  CT chest: Bilateral groundglass opacities and septal thickening are of   uncertain etiology.  2.  Bilateral patchy consolidation, some which are branching in the lower   lobes are of uncertain etiology.  3.  The distal esophagus is thickened.  4.  CT chest without contrast in 8 weeks is recommended to evaluate for   resolution/change.

## 2025-02-06 NOTE — PHYSICAL THERAPY INITIAL EVALUATION ADULT - GENERAL OBSERVATIONS, REHAB EVAL
REceived semi-supine on stretcher in ED, A&Ox4, VSS, 3L O2 via [place], + tele, droplet precautions maintained, cleared to be seen by ROBERT Polanco

## 2025-02-06 NOTE — CONSULT NOTE ADULT - SUBJECTIVE AND OBJECTIVE BOX
PULMONARY CONSULT    HPI: 89 y/o F with PMH of HFrEF (35%), Afib on Eliquis CRT-D, HTN, hypothyroid. Presents after recent bout of URI/pneumonia s/p course oral antibiotics. Symptoms resumed few days ago and then she developed lower chest discomfort about 2 cm above her lower border of the sternum. Now unable to tolerate PO, everything she eats even liquids hurts her substantially.       PAST MEDICAL & SURGICAL HISTORY:  H/O: hypothyroidism  Hypercholesteremia  Thyroid ca  Lupus  Meniere disease  Anemia  Bundle branch block, left  Bleeding hemorrhoid  Left ovarian cyst  S/P thyroidectomy 1981  History of appendectomy 1939  History of abdominal hysterectomy 1970  S/P breast biopsy Right breast benign cyst removed.  Cataract cortical, senile s/p extraction right eye 12/13      Allergies  shellfish (Hives)  aspirin (Hives)  sulfa drugs (Anaphylaxis)  penicillins (Hives)  Mushrooms. (Nausea)        FAMILY HISTORY:  Type 2 diabetes mellitus (Mother)      Social history:     Review of Systems:  CONSTITUTIONAL: No fever, chills, or fatigue  EYES: No eye pain, visual disturbances, or discharge  ENMT:  No difficulty hearing, tinnitus, vertigo; No sinus or throat pain  NECK: No pain or stiffness  RESPIRATORY: Per above  CARDIOVASCULAR: No chest pain, palpitations, dizziness, or leg swelling  GASTROINTESTINAL: No abdominal or epigastric pain. No nausea, vomiting, or hematemesis; No diarrhea or constipation. No melena or hematochezia.  GENITOURINARY: No dysuria, frequency, hematuria, or incontinence  NEUROLOGICAL: No headaches, memory loss, loss of strength, numbness, or tremors  SKIN: No itching, burning, rashes, or lesions   MUSCULOSKELETAL: No joint pain or swelling; No muscle, back, or extremity pain  PSYCHIATRIC: No depression, anxiety, mood swings, or difficulty sleeping      Medications:  MEDICATIONS  (STANDING):  aluminum hydroxide/magnesium hydroxide/simethicone Suspension 30 milliLiter(s) Oral once  aMIOdarone    Tablet 100 milliGRAM(s) Oral daily  apixaban 2.5 milliGRAM(s) Oral every 12 hours  azithromycin  IVPB 500 milliGRAM(s) IV Intermittent every 24 hours  cefTRIAXone   IVPB 1000 milliGRAM(s) IV Intermittent every 24 hours  furosemide   Injectable 40 milliGRAM(s) IV Push two times a day  levothyroxine 112 MICROGram(s) Oral daily  metoprolol succinate ER 12.5 milliGRAM(s) Oral daily  oseltamivir 30 milliGRAM(s) Oral daily  pantoprazole    Tablet 40 milliGRAM(s) Oral before breakfast  potassium chloride    Tablet ER 40 milliEquivalent(s) Oral every 4 hours  sodium chloride 0.9%. 1000 milliLiter(s) (50 mL/Hr) IV Continuous <Continuous>  spironolactone 12.5 milliGRAM(s) Oral daily  sucralfate 1 Gram(s) Oral two times a day    MEDICATIONS  (PRN):  acetaminophen     Tablet .. 650 milliGRAM(s) Oral every 6 hours PRN Temp greater or equal to 38C (100.4F), Mild Pain (1 - 3)  melatonin 3 milliGRAM(s) Oral at bedtime PRN Insomnia  ondansetron Injectable 4 milliGRAM(s) IV Push every 8 hours PRN Nausea and/or Vomiting            Vital Signs Last 24 Hrs  T(C): 36.9 (06 Feb 2025 04:45), Max: 38.3 (05 Feb 2025 19:30)  T(F): 98.5 (06 Feb 2025 04:45), Max: 100.9 (05 Feb 2025 19:30)  HR: 62 (06 Feb 2025 04:45) (62 - 88)  BP: 100/60 (06 Feb 2025 04:45) (98/62 - 114/71)  BP(mean): 74 (06 Feb 2025 03:20) (74 - 74)  RR: 18 (06 Feb 2025 04:45) (18 - 22)  SpO2: 99% (06 Feb 2025 04:45) (93% - 99%)    Parameters below as of 06 Feb 2025 04:45  Patient On (Oxygen Delivery Method): nasal cannula  O2 Flow (L/min): 3        VBG pH 7.36 02-05 @ 15:52  VBG pCO2 46 02-05 @ 15:52  VBG O2 sat 22.3 02-05 @ 15:52  VBG lactate 1.4 02-05 @ 15:52            LABS:                        11.2   5.50  )-----------( 161      ( 06 Feb 2025 06:34 )             35.4     02-06    142  |  102  |  20  ----------------------------<  85  2.9[LL]   |  25  |  1.06    Ca    8.1[L]      06 Feb 2025 06:34  Mg     2.2     02-06    TPro  7.5  /  Alb  4.0  /  TBili  0.2  /  DBili  x   /  AST  25  /  ALT  18  /  AlkPhos  57  02-05        PT/INR - ( 06 Feb 2025 06:34 )   PT: 14.1 sec;   INR: 1.23 ratio         PTT - ( 05 Feb 2025 15:52 )  PTT:33.9 sec  Urinalysis Basic - ( 06 Feb 2025 06:34 )    Color: x / Appearance: x / SG: x / pH: x  Gluc: 85 mg/dL / Ketone: x  / Bili: x / Urobili: x   Blood: x / Protein: x / Nitrite: x   Leuk Esterase: x / RBC: x / WBC x   Sq Epi: x / Non Sq Epi: x / Bacteria: x      Procalcitonin: 0.16 ng/mL (02-05-25 @ 15:52)              Physical Examination:    General: No acute distress.      HEENT: Pupils equal, reactive to light.  Symmetric.    PULM: Clear to auscultation bilaterally, no significant sputum production    CVS: S1, S2    ABD: Soft, nondistended, nontender, normoactive bowel sounds, no masses    EXT: No edema, nontender    SKIN: Warm and well perfused, no rashes noted.    NEURO: Alert, oriented, interactive, nonfocal          RADIOLOGY REVIEWED    CT chest:  < from: CT Chest No Cont (02.05.25 @ 18:11) >    FINDINGS:    Tubes/Lines: None.    Lungs, airways and pleura: Bilateral septal thickening and groundglass   opacities. There are patchy foci consolidation, some which are branching   in the bilateral lower lobes. The largest opacities in the left lower   lobe measures 1.2 x 0.7 cm (series 4 image 120).    There are secretions in the posterior segmental bronchi of the right   lower lobe. There are secretions in the trachea and right mainstem   bronchus. No pneumothorax. Scant bilateral pleural effusions..    Mediastinum: The distal esophagus is thickened.    The thyroid gland is not visualized. The chest lymph nodes measure less   than 10 mm in the short axis.    Left-sided aortic arch and left-sided descending thoracic aorta. Aortic   calcifications. The heart is enlarged. Biventricular AICD.    Upper Abdomen: The upper abdomen is unremarkable.    Bones And Soft Tissues: Spondylosis of the thoracic spine.  The soft   tissues are unremarkable.      IMPRESSION:    1.  Bilateral groundglass opacities and septal thickening are of   uncertain etiology.  2.  Bilateral patchy consolidation, some which are branching in the lower   lobes are of uncertain etiology.  3.  The distal esophagus is thickened.  4.  CT chest without contrast in 8 weeks is recommended to evaluate for   resolution/change.    --- End of Report ---    < end of copied text >   PULMONARY CONSULT    HPI: 88 y/o F with PMH of HFrEF (35%), Afib on Eliquis CRT-D, HTN, hypothyroid. Presents after recent bout of URI - pt states she was seen at urgent care a few days prior to admission and was negative for the flu. She states her CXR had no PNA. Her cough persisted, had fevers as an outpatient then she developed lower chest discomfort about 2 cm above her lower border of the sternum. Now unable to tolerate PO, everything she eats even liquids hurts her substantially. She states she is now able to tolerate PO intake. RVP +influenza A, CT chest likely with PNA. Denies CP, pleuritic CP.          PAST MEDICAL & SURGICAL HISTORY:  H/O: hypothyroidism  Hypercholesteremia  Thyroid ca  Lupus  Meniere disease  Anemia  Bundle branch block, left  Bleeding hemorrhoid  Left ovarian cyst  S/P thyroidectomy 1981  History of appendectomy 1939  History of abdominal hysterectomy 1970  S/P breast biopsy Right breast benign cyst removed.  Cataract cortical, senile s/p extraction right eye 12/13      Allergies  shellfish (Hives)  aspirin (Hives)  sulfa drugs (Anaphylaxis)  penicillins (Hives)  Mushrooms. (Nausea)        FAMILY HISTORY:  Type 2 diabetes mellitus (Mother)      Social history: never a smoker     Review of Systems:  CONSTITUTIONAL: Per above   EYES: No eye pain, visual disturbances, or discharge  ENMT:  No difficulty hearing, tinnitus, vertigo; No sinus or throat pain  NECK: No pain or stiffness  RESPIRATORY: Per above  CARDIOVASCULAR: Per above   GASTROINTESTINAL: Per above   GENITOURINARY: No dysuria, frequency, hematuria, or incontinence  NEUROLOGICAL: No headaches, memory loss, loss of strength, numbness, or tremors  SKIN: No itching, burning, rashes, or lesions   MUSCULOSKELETAL: No joint pain or swelling; No muscle, back, or extremity pain  PSYCHIATRIC: No depression, anxiety, mood swings, or difficulty sleeping      Medications:  MEDICATIONS  (STANDING):  aluminum hydroxide/magnesium hydroxide/simethicone Suspension 30 milliLiter(s) Oral once  aMIOdarone    Tablet 100 milliGRAM(s) Oral daily  apixaban 2.5 milliGRAM(s) Oral every 12 hours  azithromycin  IVPB 500 milliGRAM(s) IV Intermittent every 24 hours  cefTRIAXone   IVPB 1000 milliGRAM(s) IV Intermittent every 24 hours  furosemide   Injectable 40 milliGRAM(s) IV Push two times a day  levothyroxine 112 MICROGram(s) Oral daily  metoprolol succinate ER 12.5 milliGRAM(s) Oral daily  oseltamivir 30 milliGRAM(s) Oral daily  pantoprazole    Tablet 40 milliGRAM(s) Oral before breakfast  potassium chloride    Tablet ER 40 milliEquivalent(s) Oral every 4 hours  sodium chloride 0.9%. 1000 milliLiter(s) (50 mL/Hr) IV Continuous <Continuous>  spironolactone 12.5 milliGRAM(s) Oral daily  sucralfate 1 Gram(s) Oral two times a day    MEDICATIONS  (PRN):  acetaminophen     Tablet .. 650 milliGRAM(s) Oral every 6 hours PRN Temp greater or equal to 38C (100.4F), Mild Pain (1 - 3)  melatonin 3 milliGRAM(s) Oral at bedtime PRN Insomnia  ondansetron Injectable 4 milliGRAM(s) IV Push every 8 hours PRN Nausea and/or Vomiting            Vital Signs Last 24 Hrs  T(C): 36.9 (06 Feb 2025 04:45), Max: 38.3 (05 Feb 2025 19:30)  T(F): 98.5 (06 Feb 2025 04:45), Max: 100.9 (05 Feb 2025 19:30)  HR: 62 (06 Feb 2025 04:45) (62 - 88)  BP: 100/60 (06 Feb 2025 04:45) (98/62 - 114/71)  BP(mean): 74 (06 Feb 2025 03:20) (74 - 74)  RR: 18 (06 Feb 2025 04:45) (18 - 22)  SpO2: 99% (06 Feb 2025 04:45) (93% - 99%)    Parameters below as of 06 Feb 2025 04:45  Patient On (Oxygen Delivery Method): nasal cannula  O2 Flow (L/min): 3        VBG pH 7.36 02-05 @ 15:52  VBG pCO2 46 02-05 @ 15:52  VBG O2 sat 22.3 02-05 @ 15:52  VBG lactate 1.4 02-05 @ 15:52            LABS:                        11.2   5.50  )-----------( 161      ( 06 Feb 2025 06:34 )             35.4     02-06    142  |  102  |  20  ----------------------------<  85  2.9[LL]   |  25  |  1.06    Ca    8.1[L]      06 Feb 2025 06:34  Mg     2.2     02-06    TPro  7.5  /  Alb  4.0  /  TBili  0.2  /  DBili  x   /  AST  25  /  ALT  18  /  AlkPhos  57  02-05        PT/INR - ( 06 Feb 2025 06:34 )   PT: 14.1 sec;   INR: 1.23 ratio         PTT - ( 05 Feb 2025 15:52 )  PTT:33.9 sec  Urinalysis Basic - ( 06 Feb 2025 06:34 )    Color: x / Appearance: x / SG: x / pH: x  Gluc: 85 mg/dL / Ketone: x  / Bili: x / Urobili: x   Blood: x / Protein: x / Nitrite: x   Leuk Esterase: x / RBC: x / WBC x   Sq Epi: x / Non Sq Epi: x / Bacteria: x      Procalcitonin: 0.16 ng/mL (02-05-25 @ 15:52)              Physical Examination:    General: No acute distress.      HEENT: Pupils equal, reactive to light.  Symmetric.    PULM: scattered rhonchi bilaterally     CVS: S1, S2    ABD: Soft, nondistended, nontender, normoactive bowel sounds, no masses    EXT: No edema, nontender    SKIN: Warm and well perfused, no rashes noted.    NEURO: Alert, oriented, interactive, nonfocal          RADIOLOGY REVIEWED    CT chest:  < from: CT Chest No Cont (02.05.25 @ 18:11) >    FINDINGS:    Tubes/Lines: None.    Lungs, airways and pleura: Bilateral septal thickening and groundglass   opacities. There are patchy foci consolidation, some which are branching   in the bilateral lower lobes. The largest opacities in the left lower   lobe measures 1.2 x 0.7 cm (series 4 image 120).    There are secretions in the posterior segmental bronchi of the right   lower lobe. There are secretions in the trachea and right mainstem   bronchus. No pneumothorax. Scant bilateral pleural effusions..    Mediastinum: The distal esophagus is thickened.    The thyroid gland is not visualized. The chest lymph nodes measure less   than 10 mm in the short axis.    Left-sided aortic arch and left-sided descending thoracic aorta. Aortic   calcifications. The heart is enlarged. Biventricular AICD.    Upper Abdomen: The upper abdomen is unremarkable.    Bones And Soft Tissues: Spondylosis of the thoracic spine.  The soft   tissues are unremarkable.      IMPRESSION:    1.  Bilateral groundglass opacities and septal thickening are of   uncertain etiology.  2.  Bilateral patchy consolidation, some which are branching in the lower   lobes are of uncertain etiology.  3.  The distal esophagus is thickened.  4.  CT chest without contrast in 8 weeks is recommended to evaluate for   resolution/change.    --- End of Report ---    < end of copied text >

## 2025-02-06 NOTE — PHYSICAL THERAPY INITIAL EVALUATION ADULT - ADDITIONAL COMMENTS
Pt lives in an apartment alone, 0 steps to negotiate. Pt uses cane for functional mobility. PT reports she has an aide 7days/ 8hrs. Pt owns grab bars, RW, cane. Pt was mostly I ndependent prior to admission.

## 2025-02-06 NOTE — CONSULT NOTE ADULT - PROBLEM SELECTOR RECOMMENDATION 2
CT chest with b/l GGO and septal thickening and b/l patchy consolidations  -Continue Ceftriaxone/azithro   -Start Duoneb q6h  -Start Robitussin q6h  -Wean o2 as tolerated, keep sats >90% (currently 3LNC)

## 2025-02-06 NOTE — PHYSICAL THERAPY INITIAL EVALUATION ADULT - LEVEL OF INDEPENDENCE: GAIT, REHAB EVAL
gait disturbance due to decreased strength and balance SBA with RW, CGA with SAC/contact guard/stand-by assist

## 2025-02-06 NOTE — CONSULT NOTE ADULT - SUBJECTIVE AND OBJECTIVE BOX
DATE OF SERVICE: 02-06-25 @ 10:32    CHIEF COMPLAINT:Patient is a 89y old  Female who presents with a chief complaint of CP (06 Feb 2025 09:45)      HISTORY OF PRESENT ILLNESS:  89F pmh HFrEF(35%), Afib on eliquis, CRT-D, HTN, hypothyroid coming in after recent bout of URI/pneumonia s/p course oral antibiotics.  Symptoms resumed few days ago and today she developed lower chest discomfort about 2 cm above her lower border of the sternum. Now unable to tolerate PO, everything she eats even liquids hurts her substantially.     ROS: Denies HA, SOB, palpitation, N/V/D, fever, dizziness, recent travel, sick contact, change in bowel or urinary habits   A 10-system ROS was performed and is negative except as noted above and/or in the HPI.    ED: RVP: Flu A+, CT chest b/l GGO, given Ctx, Azithromycin, Tamiflu in ED        (05 Feb 2025 20:56)      PAST MEDICAL & SURGICAL HISTORY:  H/O: hypothyroidism      Hypercholesteremia      Thyroid ca      Lupus      Meniere disease      Anemia      Bundle branch block, left      Bleeding hemorrhoid      Left ovarian cyst      S/P thyroidectomy  1981      History of appendectomy  1939      History of abdominal hysterectomy  1970      S/P breast biopsy  Right breast benign cyst removed.      Cataract cortical, senile  s/p extraction right eye 12/13              MEDICATIONS:  aMIOdarone    Tablet 100 milliGRAM(s) Oral daily  apixaban 2.5 milliGRAM(s) Oral every 12 hours  metoprolol succinate ER 12.5 milliGRAM(s) Oral daily  spironolactone 12.5 milliGRAM(s) Oral daily    azithromycin  IVPB 500 milliGRAM(s) IV Intermittent every 24 hours  cefTRIAXone   IVPB 1000 milliGRAM(s) IV Intermittent every 24 hours  oseltamivir 30 milliGRAM(s) Oral daily      acetaminophen     Tablet .. 650 milliGRAM(s) Oral every 6 hours PRN  melatonin 3 milliGRAM(s) Oral at bedtime PRN  ondansetron Injectable 4 milliGRAM(s) IV Push every 8 hours PRN    aluminum hydroxide/magnesium hydroxide/simethicone Suspension 30 milliLiter(s) Oral once  pantoprazole    Tablet 40 milliGRAM(s) Oral before breakfast  sucralfate 1 Gram(s) Oral two times a day    levothyroxine 112 MICROGram(s) Oral daily    sodium chloride 0.9%. 1000 milliLiter(s) IV Continuous <Continuous>      FAMILY HISTORY:  Type 2 diabetes mellitus (Mother)        Non-contributory    SOCIAL HISTORY:    [- ] Tobacco  [- ] Drugs  [- ] Alcohol    Allergies    shellfish (Hives)  aspirin (Hives)  sulfa drugs (Anaphylaxis)  penicillins (Hives)  Mushrooms. (Nausea)    Intolerances    	    REVIEW OF SYSTEMS:  CONSTITUTIONAL: No fever  EYES: No eye pain, visual disturbances, or discharge  ENMT:  No difficulty hearing, tinnitus  NECK: No pain or stiffness  RESPIRATORY: No cough, wheezing,  CARDIOVASCULAR: + chest pain, palpitations, passing out, dizziness, or leg swelling  GASTROINTESTINAL:  No nausea, vomiting, diarrhea or constipation. No melena.  GENITOURINARY: No dysuria, hematuria  NEUROLOGICAL: No stroke like symptoms  SKIN: No burning or lesions   ENDOCRINE: No heat or cold intolerance  MUSCULOSKELETAL: No joint pain or swelling  PSYCHIATRIC: No  anxiety, mood swings  HEME/LYMPH: No bleeding gums  ALLERGY AND IMMUNOLOGIC: No hives or eczema	    All other ROS negative    PHYSICAL EXAM:  T(C): 36.4 (02-06-25 @ 09:59), Max: 38.3 (02-05-25 @ 19:30)  HR: 64 (02-06-25 @ 09:59) (62 - 88)  BP: 93/58 (02-06-25 @ 09:59) (93/58 - 114/71)  RR: 18 (02-06-25 @ 09:59) (18 - 22)  SpO2: 100% (02-06-25 @ 09:59) (93% - 100%)  Wt(kg): --  I&O's Summary      Appearance: Normal	  HEENT:   Normal oral mucosa, EOMI	  Cardiovascular:  S1 S2, + JVD,    Respiratory: Lungs clear to auscultation	  Psychiatry: Alert  Gastrointestinal:  Soft, Non-tender, + BS	  Skin: No rashes   Neurologic: Non-focal  Extremities:  No edema  Vascular: Peripheral pulses palpable    	    	  	  CARDIAC MARKERS:  Labs personally reviewed by me                                  11.2   5.50  )-----------( 161      ( 06 Feb 2025 06:34 )             35.4     02-06    142  |  102  |  20  ----------------------------<  85  2.9[LL]   |  25  |  1.06    Ca    8.1[L]      06 Feb 2025 06:34  Mg     2.2     02-06    TPro  7.5  /  Alb  4.0  /  TBili  0.2  /  DBili  x   /  AST  25  /  ALT  18  /  AlkPhos  57  02-05          EKG: Personally reviewed by me -   Radiology: Personally reviewed by me -   < from: Xray Chest 1 View AP/PA (02.05.25 @ 16:18) >  IMPRESSION:    No radiographic evidence of acute cardiopulmonary disease.    < end of copied text >  < from: CT Chest No Cont (02.05.25 @ 18:11) >  1.  Bilateral groundglass opacities and septal thickening are of   uncertain etiology.  2.  Bilateral patchy consolidation, some which are branching in the lower   lobes are of uncertain etiology.  3.  The distal esophagus is thickened.  4.  CT chest without contrast in 8 weeks is recommended to evaluate for   resolution/change.    < end of copied text >  < from: TTE W or WO Ultrasound Enhancing Agent (07.10.23 @ 09:00) >   1. The left ventricular systolic function is mildly to moderately decreased with an ejection fraction visually estimated at 45 to 50 %.   2. Basal and mid inferior wall, basal and mid inferolateral wall, and basal anterolateral segment are abnormal.   3. Device lead is visualized in the right heart.        Assessment /Plan:     89F pmh HFrEF(35%), Afib on eliquis, CRT-D, HTN, hypothyroid coming in after recent bout of URI/pneumonia s/p course oral antibiotics.  Symptoms resumed few days ago and today she developed lower chest discomfort about 2 cm above her lower border of the sternum. Now unable to tolerate PO, everything she eats even liquids hurts her substantially.     Problem/Plan #1: Chest Pain  - Will review ECG although given she has CRT may not be diagnostic for ischemia  - Trop 45-->47 similar to prior admission  - BNP 5398 (higher than prior admissions)  - Prior TTE shows EF 45-50%, + WMA; Will repeat   - CT shows B/L GGOs, septal thickening, B/L patchy consolidation, thickening of distal esophagus  - Further recs pending review of TTE; Symptoms likely 2/2 ASHF vs GERD    Problem/Plan #2: Acute Systolic Heart Failure  - s/p CRT  - CXR with no cardiopulmonary dysfunction  - CT shows B/L GGOs, septal thickening, B/L patchy consolidation, thickening of distal esophagus  - BNP 5398  - c/w sHF GDMT:  --- Toprol 12.5mg PO daily  --- Spironolactone 12.5mg PO daily  --- Hold Jardiance until no longer on IV diuretics  --- c/w Lasix 40mg IV daily  - Monitor Strict I&Os, daily standing weights (patient reports dry weight ~93lbs)  - Repeat TTE pending    Problem/Plan #3: Atrial Fibrillation  - c/w amiodarone 100mg PO daily  - c/w Toprol 12.5mg PO daily  - c/w Eliquis 2.5mg PO BID    Problem/Plan #4: Influenza A  - c/w tamiflu  - c/w Abx as per primary team  -c/w supplemental oxygen and wean as tolerated      Differential diagnosis and plan of care discussed with patient after the evaluation. Counseling on diet, nutritional counseling, weight management, exercise and medication compliance was done.   Advanced care planning/advanced directives discussed with patient/family. DNR status including forceful chest compressions to attempt to restart the heart, ventilator support/artificial breathing, electric shock, artificial nutrition, health care proxy, Molst form all discussed with pt. Pt wishes to consider. More than fifteen minutes spent on discussing advanced directives.     Dennise Ariza [facility]  Jese Salgado DO [facility]  Cardiovascular Medicine  [address] [place] [address] Dr, [address]  Available for call or text via [employer] TE[employer]  Office [phone]   DATE OF SERVICE: 02-06-25 @ 10:32    CHIEF COMPLAINT:Patient is a 89y old  Female who presents with a chief complaint of CP (06 Feb 2025 09:45)      HISTORY OF PRESENT ILLNESS:  89F pmh HFrEF(35%), Afib on eliquis, CRT-D, HTN, hypothyroid coming in after recent bout of URI/pneumonia s/p course oral antibiotics.  Symptoms resumed few days ago and today she developed lower chest discomfort about 2 cm above her lower border of the sternum. Now unable to tolerate PO, everything she eats even liquids hurts her substantially.     ROS: Denies HA, SOB, palpitation, N/V/D, fever, dizziness, recent travel, sick contact, change in bowel or urinary habits   A 10-system ROS was performed and is negative except as noted above and/or in the HPI.    ED: RVP: Flu A+, CT chest b/l GGO, given Ctx, Azithromycin, Tamiflu in ED        (05 Feb 2025 20:56)      PAST MEDICAL & SURGICAL HISTORY:  H/O: hypothyroidism      Hypercholesteremia      Thyroid ca      Lupus      Meniere disease      Anemia      Bundle branch block, left      Bleeding hemorrhoid      Left ovarian cyst      S/P thyroidectomy  1981      History of appendectomy  1939      History of abdominal hysterectomy  1970      S/P breast biopsy  Right breast benign cyst removed.      Cataract cortical, senile  s/p extraction right eye 12/13              MEDICATIONS:  aMIOdarone    Tablet 100 milliGRAM(s) Oral daily  apixaban 2.5 milliGRAM(s) Oral every 12 hours  metoprolol succinate ER 12.5 milliGRAM(s) Oral daily  spironolactone 12.5 milliGRAM(s) Oral daily    azithromycin  IVPB 500 milliGRAM(s) IV Intermittent every 24 hours  cefTRIAXone   IVPB 1000 milliGRAM(s) IV Intermittent every 24 hours  oseltamivir 30 milliGRAM(s) Oral daily      acetaminophen     Tablet .. 650 milliGRAM(s) Oral every 6 hours PRN  melatonin 3 milliGRAM(s) Oral at bedtime PRN  ondansetron Injectable 4 milliGRAM(s) IV Push every 8 hours PRN    aluminum hydroxide/magnesium hydroxide/simethicone Suspension 30 milliLiter(s) Oral once  pantoprazole    Tablet 40 milliGRAM(s) Oral before breakfast  sucralfate 1 Gram(s) Oral two times a day    levothyroxine 112 MICROGram(s) Oral daily    sodium chloride 0.9%. 1000 milliLiter(s) IV Continuous <Continuous>      FAMILY HISTORY:  Type 2 diabetes mellitus (Mother)        Non-contributory    SOCIAL HISTORY:    [- ] Tobacco  [- ] Drugs  [- ] Alcohol    Allergies    shellfish (Hives)  aspirin (Hives)  sulfa drugs (Anaphylaxis)  penicillins (Hives)  Mushrooms. (Nausea)    Intolerances    	    REVIEW OF SYSTEMS:  CONSTITUTIONAL: No fever  EYES: No eye pain, visual disturbances, or discharge  ENMT:  No difficulty hearing, tinnitus  NECK: No pain or stiffness  RESPIRATORY: No cough, wheezing,  CARDIOVASCULAR: + chest pain, palpitations, passing out, dizziness, or leg swelling  GASTROINTESTINAL:  No nausea, vomiting, diarrhea or constipation. No melena.  GENITOURINARY: No dysuria, hematuria  NEUROLOGICAL: No stroke like symptoms  SKIN: No burning or lesions   ENDOCRINE: No heat or cold intolerance  MUSCULOSKELETAL: No joint pain or swelling  PSYCHIATRIC: No  anxiety, mood swings  HEME/LYMPH: No bleeding gums  ALLERGY AND IMMUNOLOGIC: No hives or eczema	    All other ROS negative    PHYSICAL EXAM:  T(C): 36.4 (02-06-25 @ 09:59), Max: 38.3 (02-05-25 @ 19:30)  HR: 64 (02-06-25 @ 09:59) (62 - 88)  BP: 93/58 (02-06-25 @ 09:59) (93/58 - 114/71)  RR: 18 (02-06-25 @ 09:59) (18 - 22)  SpO2: 100% (02-06-25 @ 09:59) (93% - 100%)  Wt(kg): --  I&O's Summary      Appearance: Normal	  HEENT:   Normal oral mucosa, EOMI	  Cardiovascular:  S1 S2, + JVD,    Respiratory: Lungs clear to auscultation	  Psychiatry: Alert  Gastrointestinal:  Soft, Non-tender, + BS	  Skin: No rashes   Neurologic: Non-focal  Extremities:  No edema  Vascular: Peripheral pulses palpable    	    	  	  CARDIAC MARKERS:  Labs personally reviewed by me                                  11.2   5.50  )-----------( 161      ( 06 Feb 2025 06:34 )             35.4     02-06    142  |  102  |  20  ----------------------------<  85  2.9[LL]   |  25  |  1.06    Ca    8.1[L]      06 Feb 2025 06:34  Mg     2.2     02-06    TPro  7.5  /  Alb  4.0  /  TBili  0.2  /  DBili  x   /  AST  25  /  ALT  18  /  AlkPhos  57  02-05          EKG: Personally reviewed by me -   Radiology: Personally reviewed by me -   < from: Xray Chest 1 View AP/PA (02.05.25 @ 16:18) >  IMPRESSION:    No radiographic evidence of acute cardiopulmonary disease.    < end of copied text >  < from: CT Chest No Cont (02.05.25 @ 18:11) >  1.  Bilateral groundglass opacities and septal thickening are of   uncertain etiology.  2.  Bilateral patchy consolidation, some which are branching in the lower   lobes are of uncertain etiology.  3.  The distal esophagus is thickened.  4.  CT chest without contrast in 8 weeks is recommended to evaluate for   resolution/change.    < end of copied text >  < from: TTE W or WO Ultrasound Enhancing Agent (07.10.23 @ 09:00) >   1. The left ventricular systolic function is mildly to moderately decreased with an ejection fraction visually estimated at 45 to 50 %.   2. Basal and mid inferior wall, basal and mid inferolateral wall, and basal anterolateral segment are abnormal.   3. Device lead is visualized in the right heart.        Assessment /Plan:     89F pmh HFrEF(35%), Afib on eliquis, CRT-D, HTN, hypothyroid coming in after recent bout of URI/pneumonia s/p course oral antibiotics.  Symptoms resumed few days ago and today she developed lower chest discomfort about 2 cm above her lower border of the sternum. Now unable to tolerate PO, everything she eats even liquids hurts her substantially.     Problem/Plan #1: Chest Pain  - Will review ECG although given she has CRT may not be diagnostic for ischemia  - Trop 45-->47 similar to prior admission  - BNP 5398 (higher than prior admissions)  - Prior TTE shows EF 45-50%, + WMA; Will repeat   - CT shows B/L GGOs, septal thickening, B/L patchy consolidation, thickening of distal esophagus  - Further recs pending review of TTE; Symptoms likely 2/2 ASHF vs GERD  --- Will give Lasix 40mg IV once and assess need for further IV diuretics daily pending stability of BP, Cr and UOP    Problem/Plan #2: Acute Systolic Heart Failure  - s/p CRT  - CXR with no cardiopulmonary dysfunction  - CT shows B/L GGOs, septal thickening, B/L patchy consolidation, thickening of distal esophagus  - BNP 5398  - c/w sHF GDMT:  --- Toprol 12.5mg PO daily  --- Spironolactone 12.5mg PO daily  --- Hold Jardiance until no longer on IV diuretics  --- Will give Lasix 40mg IV once and assess need for further IV diuretics daily pending stability of BP, Cr and UOP  - Monitor Strict I&Os, daily standing weights (patient reports dry weight ~93lbs)  - Repeat TTE pending    Problem/Plan #3: Atrial Fibrillation  - c/w amiodarone 100mg PO daily  - c/w Toprol 12.5mg PO daily  - c/w Eliquis 2.5mg PO BID    Problem/Plan #4: Influenza A  - c/w tamiflu  - c/w Abx as per primary team  -c/w supplemental oxygen and wean as tolerated      Differential diagnosis and plan of care discussed with patient after the evaluation. Counseling on diet, nutritional counseling, weight management, exercise and medication compliance was done.   Advanced care planning/advanced directives discussed with patient/family. DNR status including forceful chest compressions to attempt to restart the heart, ventilator support/artificial breathing, electric shock, artificial nutrition, health care proxy, Molst form all discussed with pt. Pt wishes to consider. More than fifteen minutes spent on discussing advanced directives.     Dennise Ariza [facility]  Jese Salgado DO [facility]  Cardiovascular Medicine  [address], [address]  Available for call or text via [employer]  Office [phone]   DATE OF SERVICE: 02-06-25 @ 10:32    CHIEF COMPLAINT:Patient is a 89y old  Female who presents with a chief complaint of CP (06 Feb 2025 09:45)      HISTORY OF PRESENT ILLNESS:  89F pmh HFrEF(35%), Afib on eliquis, CRT-D, HTN, hypothyroid coming in after recent bout of URI/pneumonia s/p course oral antibiotics.  Symptoms resumed few days ago and today she developed lower chest discomfort about 2 cm above her lower border of the sternum. Now unable to tolerate PO, everything she eats even liquids hurts her substantially.     ROS: Denies HA, SOB, palpitation, N/V/D, fever, dizziness, recent travel, sick contact, change in bowel or urinary habits   A 10-system ROS was performed and is negative except as noted above and/or in the HPI.    ED: RVP: Flu A+, CT chest b/l GGO, given Ctx, Azithromycin, Tamiflu in ED        (05 Feb 2025 20:56)      PAST MEDICAL & SURGICAL HISTORY:  H/O: hypothyroidism      Hypercholesteremia      Thyroid ca      Lupus      Meniere disease      Anemia      Bundle branch block, left      Bleeding hemorrhoid      Left ovarian cyst      S/P thyroidectomy  1981      History of appendectomy  1939      History of abdominal hysterectomy  1970      S/P breast biopsy  Right breast benign cyst removed.      Cataract cortical, senile  s/p extraction right eye 12/13              MEDICATIONS:  aMIOdarone    Tablet 100 milliGRAM(s) Oral daily  apixaban 2.5 milliGRAM(s) Oral every 12 hours  metoprolol succinate ER 12.5 milliGRAM(s) Oral daily  spironolactone 12.5 milliGRAM(s) Oral daily    azithromycin  IVPB 500 milliGRAM(s) IV Intermittent every 24 hours  cefTRIAXone   IVPB 1000 milliGRAM(s) IV Intermittent every 24 hours  oseltamivir 30 milliGRAM(s) Oral daily      acetaminophen     Tablet .. 650 milliGRAM(s) Oral every 6 hours PRN  melatonin 3 milliGRAM(s) Oral at bedtime PRN  ondansetron Injectable 4 milliGRAM(s) IV Push every 8 hours PRN    aluminum hydroxide/magnesium hydroxide/simethicone Suspension 30 milliLiter(s) Oral once  pantoprazole    Tablet 40 milliGRAM(s) Oral before breakfast  sucralfate 1 Gram(s) Oral two times a day    levothyroxine 112 MICROGram(s) Oral daily    sodium chloride 0.9%. 1000 milliLiter(s) IV Continuous <Continuous>      FAMILY HISTORY:  Type 2 diabetes mellitus (Mother)        Non-contributory    SOCIAL HISTORY:    [- ] Tobacco  [- ] Drugs  [- ] Alcohol    Allergies    shellfish (Hives)  aspirin (Hives)  sulfa drugs (Anaphylaxis)  penicillins (Hives)  Mushrooms. (Nausea)    Intolerances    	    REVIEW OF SYSTEMS:  CONSTITUTIONAL: No fever  EYES: No eye pain, visual disturbances, or discharge  ENMT:  No difficulty hearing, tinnitus  NECK: No pain or stiffness  RESPIRATORY: No cough, wheezing,  CARDIOVASCULAR: + chest pain, palpitations, passing out, dizziness, or leg swelling  GASTROINTESTINAL:  No nausea, vomiting, diarrhea or constipation. No melena.  GENITOURINARY: No dysuria, hematuria  NEUROLOGICAL: No stroke like symptoms  SKIN: No burning or lesions   ENDOCRINE: No heat or cold intolerance  MUSCULOSKELETAL: No joint pain or swelling  PSYCHIATRIC: No  anxiety, mood swings  HEME/LYMPH: No bleeding gums  ALLERGY AND IMMUNOLOGIC: No hives or eczema	    All other ROS negative    PHYSICAL EXAM:  T(C): 36.4 (02-06-25 @ 09:59), Max: 38.3 (02-05-25 @ 19:30)  HR: 64 (02-06-25 @ 09:59) (62 - 88)  BP: 93/58 (02-06-25 @ 09:59) (93/58 - 114/71)  RR: 18 (02-06-25 @ 09:59) (18 - 22)  SpO2: 100% (02-06-25 @ 09:59) (93% - 100%)  Wt(kg): --  I&O's Summary      Appearance: Normal	  HEENT:   Normal oral mucosa, EOMI	  Cardiovascular:  S1 S2, + JVD,    Respiratory: Lungs clear to auscultation	  Psychiatry: Alert  Gastrointestinal:  Soft, Non-tender, + BS	  Skin: No rashes   Neurologic: Non-focal  Extremities:  No edema  Vascular: Peripheral pulses palpable    	    	  	  CARDIAC MARKERS:  Labs personally reviewed by me                                  11.2   5.50  )-----------( 161      ( 06 Feb 2025 06:34 )             35.4     02-06    142  |  102  |  20  ----------------------------<  85  2.9[LL]   |  25  |  1.06    Ca    8.1[L]      06 Feb 2025 06:34  Mg     2.2     02-06    TPro  7.5  /  Alb  4.0  /  TBili  0.2  /  DBili  x   /  AST  25  /  ALT  18  /  AlkPhos  57  02-05          EKG: Personally reviewed by me -   Radiology: Personally reviewed by me -   < from: Xray Chest 1 View AP/PA (02.05.25 @ 16:18) >  IMPRESSION:    No radiographic evidence of acute cardiopulmonary disease.    < end of copied text >  < from: CT Chest No Cont (02.05.25 @ 18:11) >  1.  Bilateral groundglass opacities and septal thickening are of   uncertain etiology.  2.  Bilateral patchy consolidation, some which are branching in the lower   lobes are of uncertain etiology.  3.  The distal esophagus is thickened.  4.  CT chest without contrast in 8 weeks is recommended to evaluate for   resolution/change.    < end of copied text >  < from: TTE W or WO Ultrasound Enhancing Agent (07.10.23 @ 09:00) >   1. The left ventricular systolic function is mildly to moderately decreased with an ejection fraction visually estimated at 45 to 50 %.   2. Basal and mid inferior wall, basal and mid inferolateral wall, and basal anterolateral segment are abnormal.   3. Device lead is visualized in the right heart.          Assessment /Plan:     89F pmh HFrEF(35%), Afib on eliquis, CRT-D, HTN, hypothyroid coming in after recent bout of URI/pneumonia s/p course oral antibiotics.  Symptoms resumed few days ago and today she developed lower chest discomfort about 2 cm above her lower border of the sternum. Now unable to tolerate PO, everything she eats even liquids hurts her substantially.     Problem/Plan #1: Chest Pain  - Will review ECG although given she has CRT may not be diagnostic for ischemia  - Trop 45-->47 similar to prior admission  - BNP 5398 (higher than prior admissions)  - Prior TTE shows EF 45-50%, + WMA; Will repeat   - CT shows B/L GGOs, septal thickening, B/L patchy consolidation, thickening of distal esophagus  - Further recs pending review of TTE; Symptoms likely 2/2 ASHF vs GERD  --- Will give Lasix 40mg IV once and assess need for further IV diuretics daily pending stability of BP, Cr and UOP    Problem/Plan #2: Acute Systolic Heart Failure  - s/p CRT  - CXR with no cardiopulmonary dysfunction  - CT shows B/L GGOs, septal thickening, B/L patchy consolidation, thickening of distal esophagus  - BNP 5398  - c/w sHF GDMT:  --- Toprol 12.5mg PO daily  --- Spironolactone 12.5mg PO daily  --- Hold Jardiance until no longer on IV diuretics  --- Will give Lasix 40mg IV once and assess need for further IV diuretics daily pending stability of BP, Cr and UOP  - Monitor Strict I&Os, daily standing weights (patient reports dry weight ~93lbs)  - Repeat TTE pending    Problem/Plan #3: Atrial Fibrillation  - c/w amiodarone 100mg PO daily  - c/w Toprol 12.5mg PO daily  - c/w Eliquis 2.5mg PO BID    Problem/Plan #4: Influenza A  - c/w tamiflu  - c/w Abx as per primary team  - c/w supplemental oxygen and wean as tolerated        Differential diagnosis and plan of care discussed with patient after the evaluation. Counseling on diet, nutritional counseling, weight management, exercise and medication compliance was done.   Advanced care planning/advanced directives discussed with patient/family. DNR status including forceful chest compressions to attempt to restart the heart, ventilator support/artificial breathing, electric shock, artificial nutrition, health care proxy, Molst form all discussed with pt. Pt wishes to consider. More than fifteen minutes spent on discussing advanced directives.         Dennise Ariza [facility]  Jese Salgado DO [facility]  Cardiovascular Medicine  [address], [address]  Available for call or text via [employer]  Office [phone]

## 2025-02-07 LAB
ANION GAP SERPL CALC-SCNC: 15 MMOL/L — SIGNIFICANT CHANGE UP (ref 5–17)
BUN SERPL-MCNC: 26 MG/DL — HIGH (ref 7–23)
CALCIUM SERPL-MCNC: 8.5 MG/DL — SIGNIFICANT CHANGE UP (ref 8.4–10.5)
CHLORIDE SERPL-SCNC: 104 MMOL/L — SIGNIFICANT CHANGE UP (ref 96–108)
CO2 SERPL-SCNC: 22 MMOL/L — SIGNIFICANT CHANGE UP (ref 22–31)
CREAT SERPL-MCNC: 1.19 MG/DL — SIGNIFICANT CHANGE UP (ref 0.5–1.3)
EGFR: 44 ML/MIN/1.73M2 — LOW
GLUCOSE SERPL-MCNC: 80 MG/DL — SIGNIFICANT CHANGE UP (ref 70–99)
M PNEUMO IGM SER-ACNC: 0.21 INDEX — SIGNIFICANT CHANGE UP (ref 0–0.9)
MAGNESIUM SERPL-MCNC: 2.5 MG/DL — SIGNIFICANT CHANGE UP (ref 1.6–2.6)
MYCOPLASMA AG SPEC QL: NEGATIVE — SIGNIFICANT CHANGE UP
POTASSIUM SERPL-MCNC: 3.9 MMOL/L — SIGNIFICANT CHANGE UP (ref 3.5–5.3)
POTASSIUM SERPL-SCNC: 3.9 MMOL/L — SIGNIFICANT CHANGE UP (ref 3.5–5.3)
S PNEUM AG UR QL: NEGATIVE — SIGNIFICANT CHANGE UP
SODIUM SERPL-SCNC: 141 MMOL/L — SIGNIFICANT CHANGE UP (ref 135–145)

## 2025-02-07 RX ADMIN — ACETAMINOPHEN 650 MILLIGRAM(S): 160 SUSPENSION ORAL at 01:29

## 2025-02-07 RX ADMIN — CEFTRIAXONE 100 MILLIGRAM(S): 250 INJECTION, POWDER, FOR SOLUTION INTRAMUSCULAR; INTRAVENOUS at 00:04

## 2025-02-07 RX ADMIN — APIXABAN 2.5 MILLIGRAM(S): 5 TABLET, FILM COATED ORAL at 17:04

## 2025-02-07 RX ADMIN — Medication 100 MILLIGRAM(S): at 17:05

## 2025-02-07 RX ADMIN — LEVOTHYROXINE SODIUM 112 MICROGRAM(S): 25 TABLET ORAL at 05:49

## 2025-02-07 RX ADMIN — IPRATROPIUM BROMIDE AND ALBUTEROL SULFATE 3 MILLILITER(S): .5; 2.5 SOLUTION RESPIRATORY (INHALATION) at 05:49

## 2025-02-07 RX ADMIN — IPRATROPIUM BROMIDE AND ALBUTEROL SULFATE 3 MILLILITER(S): .5; 2.5 SOLUTION RESPIRATORY (INHALATION) at 11:11

## 2025-02-07 RX ADMIN — Medication 100 MILLIGRAM(S): at 05:48

## 2025-02-07 RX ADMIN — ACETAMINOPHEN 650 MILLIGRAM(S): 160 SUSPENSION ORAL at 00:29

## 2025-02-07 RX ADMIN — IPRATROPIUM BROMIDE AND ALBUTEROL SULFATE 3 MILLILITER(S): .5; 2.5 SOLUTION RESPIRATORY (INHALATION) at 17:05

## 2025-02-07 RX ADMIN — SUCRALFATE 1 GRAM(S): 1 SUSPENSION ORAL at 17:04

## 2025-02-07 RX ADMIN — AZITHROMYCIN DIHYDRATE 250 MILLIGRAM(S): 500 TABLET, FILM COATED ORAL at 23:45

## 2025-02-07 RX ADMIN — AMIODARONE HYDROCHLORIDE 100 MILLIGRAM(S): 50 INJECTION, SOLUTION INTRAVENOUS at 05:54

## 2025-02-07 RX ADMIN — Medication 100 MILLIGRAM(S): at 11:11

## 2025-02-07 RX ADMIN — OSELTAMIVIR PHOSPHATE 30 MILLIGRAM(S): 75 CAPSULE ORAL at 11:11

## 2025-02-07 RX ADMIN — PANTOPRAZOLE 40 MILLIGRAM(S): 20 TABLET, DELAYED RELEASE ORAL at 05:39

## 2025-02-07 RX ADMIN — SUCRALFATE 1 GRAM(S): 1 SUSPENSION ORAL at 05:49

## 2025-02-07 RX ADMIN — APIXABAN 2.5 MILLIGRAM(S): 5 TABLET, FILM COATED ORAL at 05:49

## 2025-02-07 NOTE — DIETITIAN INITIAL EVALUATION ADULT - PERTINENT MEDS FT
MEDICATIONS  (STANDING):  albuterol/ipratropium for Nebulization 3 milliLiter(s) Nebulizer every 6 hours  aluminum hydroxide/magnesium hydroxide/simethicone Suspension 30 milliLiter(s) Oral once  aMIOdarone    Tablet 100 milliGRAM(s) Oral daily  apixaban 2.5 milliGRAM(s) Oral every 12 hours  azithromycin  IVPB 500 milliGRAM(s) IV Intermittent every 24 hours  cefTRIAXone   IVPB 1000 milliGRAM(s) IV Intermittent every 24 hours  guaiFENesin Oral Liquid (Sugar-Free) 100 milliGRAM(s) Oral every 6 hours  levothyroxine 112 MICROGram(s) Oral daily  metoprolol succinate ER 12.5 milliGRAM(s) Oral daily  oseltamivir 30 milliGRAM(s) Oral daily  pantoprazole    Tablet 40 milliGRAM(s) Oral before breakfast  sodium chloride 0.9%. 1000 milliLiter(s) (50 mL/Hr) IV Continuous <Continuous>  spironolactone 12.5 milliGRAM(s) Oral daily  sucralfate 1 Gram(s) Oral two times a day    MEDICATIONS  (PRN):  acetaminophen     Tablet .. 650 milliGRAM(s) Oral every 6 hours PRN Temp greater or equal to 38C (100.4F), Mild Pain (1 - 3)  melatonin 3 milliGRAM(s) Oral at bedtime PRN Insomnia  ondansetron Injectable 4 milliGRAM(s) IV Push every 8 hours PRN Nausea and/or Vomiting

## 2025-02-07 NOTE — PROGRESS NOTE ADULT - SUBJECTIVE AND OBJECTIVE BOX
DATE OF SERVICE: 02-07-25 @ 09:06    Patient is a 89y old  Female who presents with a chief complaint of CP (06 Feb 2025 11:35)      INTERVAL HISTORY: in no acute distress    REVIEW OF SYSTEMS:  CONSTITUTIONAL: No weakness  EYES/ENT: No visual changes;  No throat pain   NECK: No pain or stiffness  RESPIRATORY: No cough, wheezing; No shortness of breath  CARDIOVASCULAR: No chest pain or palpitations  GASTROINTESTINAL: No abdominal  pain. No nausea, vomiting, or hematemesis  GENITOURINARY: No dysuria, frequency or hematuria  NEUROLOGICAL: No stroke like symptoms  SKIN: No rashes    TELEMETRY Personally reviewed:  	  MEDICATIONS:  aMIOdarone    Tablet 100 milliGRAM(s) Oral daily  metoprolol succinate ER 12.5 milliGRAM(s) Oral daily  spironolactone 12.5 milliGRAM(s) Oral daily        PHYSICAL EXAM:  T(C): 36.3 (02-07-25 @ 04:59), Max: 36.9 (02-06-25 @ 18:53)  HR: 66 (02-07-25 @ 04:59) (64 - 72)  BP: 94/60 (02-07-25 @ 04:59) (93/58 - 104/68)  RR: 17 (02-07-25 @ 04:59) (17 - 20)  SpO2: 97% (02-07-25 @ 04:59) (96% - 100%)  Wt(kg): --  I&O's Summary        Appearance: In no distress	  HEENT:    PERRL, EOMI	  Cardiovascular:  S1 S2, No JVD  Respiratory: Lungs clear to auscultation	  Gastrointestinal:  Soft, Non-tender, + BS	  Vascularature:  No edema of LE  Psychiatric: Appropriate affect   Neuro: no acute focal deficits                               11.2   5.50  )-----------( 161      ( 06 Feb 2025 06:34 )             35.4     02-07    141  |  104  |  26[H]  ----------------------------<  80  3.9   |  22  |  1.19    Ca    8.5      07 Feb 2025 07:25  Mg     2.5     02-07    TPro  7.5  /  Alb  4.0  /  TBili  0.2  /  DBili  x   /  AST  25  /  ALT  18  /  AlkPhos  57  02-05        Labs personally reviewed    < from: TTE W or WO Ultrasound Enhancing Agent (02.06.25 @ 16:01) >  CONCLUSIONS:      1. Left ventricular cavity is severely dilated. Left ventricular systolic function is mildly decreased with an ejection fraction of 41 % by Little's method of disks. Regional wall motion abnormalities present.   2. There is severe (grade 3) left ventricular diastolic dysfunction, with elevated left ventricular filling pressure.   3. Normal right ventricular cavity size and normal right ventricular systolic function.   4. Moderate to severe mitral regurgitation.   5. Estimated pulmonary artery systolic pressure is 44 mmHg, consistent with mild pulmonary hypertension.   6. Compared to the transthoracic echocardiogram performed on 7/10/2023, there is an increase in the degree of mitral regurgitation and grade 3 diastolic dysfunction.      ASSESSMENT/PLAN: 	    89F pmh HFrEF(35%), Afib on eliquis, CRT-D, HTN, hypothyroid coming in after recent bout of URI/pneumonia s/p course oral antibiotics.  Symptoms resumed few days ago and today she developed lower chest discomfort about 2 cm above her lower border of the sternum. Now unable to tolerate PO, everything she eats even liquids hurts her substantially.     Problem/Plan #1: Chest Pain  - Will review ECG although given she has CRT may not be diagnostic for ischemia  - Trop 45-->47 similar to prior admission  - BNP 5398 (higher than prior admissions)  - Prior TTE shows EF 45-50%, + WMA; Will repeat   - CT shows B/L GGOs, septal thickening, B/L patchy consolidation, thickening of distal esophagus  - Further recs pending review of TTE; Symptoms likely 2/2 ASHF vs GERD  --- Will give Lasix 40mg IV once and assess need for further IV diuretics daily pending stability of BP, Cr and UOP  - TTE 2/6 - EF 41%, WMA (stable compared to 2023), severe grade 3 DD, elevated filling pressure, mod-severe MR    Problem/Plan #2: Acute Systolic Heart Failure  - s/p CRT  - CXR with no cardiopulmonary dysfunction  - CT shows B/L GGOs, septal thickening, B/L patchy consolidation, thickening of distal esophagus  - BNP 5398  - c/w sHF GDMT:  --- Toprol 12.5mg PO daily  --- Spironolactone 12.5mg PO daily  --- Hold Jardiance until no longer on IV diuretics  --- Will give Lasix 40mg IV once and assess need for further IV diuretics daily pending stability of BP, Cr and UOP  - Monitor Strict I&Os, daily standing weights (patient reports dry weight ~93lbs)  - TTE 2/6 - EF 41%, WMA (stable compared to 2023), severe grade 3 DD, elevated filling pressure, mod-severe MR    Problem/Plan #3: Atrial Fibrillation  - c/w amiodarone 100mg PO daily  - c/w Toprol 12.5mg PO daily  - c/w Eliquis 2.5mg PO BID    Problem/Plan #4: Influenza A  - c/w tamiflu  - c/w Abx as per primary team  - c/w supplemental oxygen and wean as tolerated      THIAGO Holland DO [facility]  Cardiovascular Medicine  [address] [place] [address], [address]  Available through call or text on [employer] TE[employer]  Office: [phone]     DATE OF SERVICE: 02-07-25 @ 09:06    Patient is a 89y old  Female who presents with a chief complaint of CP (06 Feb 2025 11:35)      INTERVAL HISTORY: in no acute distress, feels okay, denies SOB/CP    REVIEW OF SYSTEMS:  CONSTITUTIONAL: No weakness  EYES/ENT: No visual changes;  No throat pain   NECK: No pain or stiffness  RESPIRATORY: No cough, wheezing; No shortness of breath  CARDIOVASCULAR: No chest pain or palpitations  GASTROINTESTINAL: No abdominal  pain. No nausea, vomiting, or hematemesis  GENITOURINARY: No dysuria, frequency or hematuria  NEUROLOGICAL: No stroke like symptoms  SKIN: No rashes    TELEMETRY Personally reviewed: SR 60s  	  MEDICATIONS:  aMIOdarone    Tablet 100 milliGRAM(s) Oral daily  metoprolol succinate ER 12.5 milliGRAM(s) Oral daily  spironolactone 12.5 milliGRAM(s) Oral daily        PHYSICAL EXAM:  T(C): 36.3 (02-07-25 @ 04:59), Max: 36.9 (02-06-25 @ 18:53)  HR: 66 (02-07-25 @ 04:59) (64 - 72)  BP: 94/60 (02-07-25 @ 04:59) (93/58 - 104/68)  RR: 17 (02-07-25 @ 04:59) (17 - 20)  SpO2: 97% (02-07-25 @ 04:59) (96% - 100%)  Wt(kg): --  I&O's Summary        Appearance: In no distress	  HEENT:    PERRL, EOMI	  Cardiovascular:  S1 S2, No JVD  Respiratory: Lungs clear to auscultation	  Gastrointestinal:  Soft, Non-tender, + BS	  Vascularature:  No edema of LE  Psychiatric: Appropriate affect   Neuro: no acute focal deficits                               11.2   5.50  )-----------( 161      ( 06 Feb 2025 06:34 )             35.4     02-07    141  |  104  |  26[H]  ----------------------------<  80  3.9   |  22  |  1.19    Ca    8.5      07 Feb 2025 07:25  Mg     2.5     02-07    TPro  7.5  /  Alb  4.0  /  TBili  0.2  /  DBili  x   /  AST  25  /  ALT  18  /  AlkPhos  57  02-05        Labs personally reviewed    < from: TTE W or WO Ultrasound Enhancing Agent (02.06.25 @ 16:01) >  CONCLUSIONS:      1. Left ventricular cavity is severely dilated. Left ventricular systolic function is mildly decreased with an ejection fraction of 41 % by Little's method of disks. Regional wall motion abnormalities present.   2. There is severe (grade 3) left ventricular diastolic dysfunction, with elevated left ventricular filling pressure.   3. Normal right ventricular cavity size and normal right ventricular systolic function.   4. Moderate to severe mitral regurgitation.   5. Estimated pulmonary artery systolic pressure is 44 mmHg, consistent with mild pulmonary hypertension.   6. Compared to the transthoracic echocardiogram performed on 7/10/2023, there is an increase in the degree of mitral regurgitation and grade 3 diastolic dysfunction.      ASSESSMENT/PLAN: 	    89F pmh HFrEF(35%), Afib on eliquis, CRT-D, HTN, hypothyroid coming in after recent bout of URI/pneumonia s/p course oral antibiotics.  Symptoms resumed few days ago and today she developed lower chest discomfort about 2 cm above her lower border of the sternum. Now unable to tolerate PO, everything she eats even liquids hurts her substantially.     Problem/Plan #1: Chest Pain  - Will review ECG although given she has CRT may not be diagnostic for ischemia  - Trop 45-->47 similar to prior admission  - BNP 5398 (higher than prior admissions)  - Prior TTE shows EF 45-50%, + WMA; Will repeat   - CT shows B/L GGOs, septal thickening, B/L patchy consolidation, thickening of distal esophagus  - Further recs pending review of TTE; Symptoms likely 2/2 ASHF vs GERD  --- s/p Lasix 40mg IV x 1   - Will assess need for further diuretics daily pending symptoms, stability of BP, Cr and UOP  - TTE 2/6 - EF 41%, WMA (stable compared to 2023), severe grade 3 DD, elevated filling pressure, mod-severe MR    Problem/Plan #2: Acute Systolic Heart Failure  - s/p CRT  - CXR with no cardiopulmonary dysfunction  - CT shows B/L GGOs, septal thickening, B/L patchy consolidation, thickening of distal esophagus  - BNP 5398  - c/w sHF GDMT:  --- Toprol 12.5mg PO daily  --- Spironolactone 12.5mg PO daily  --- Hold Jardiance until no longer on IV diuretics  - s/p Lasix 40mg IV x 1 on 2/6  - Will assess need for further diuretics daily pending symptoms, stability of BP, Cr and UOP  - Monitor Strict I&Os, daily standing weights (patient reports dry weight ~93lbs)  - TTE 2/6 - EF 41%, WMA (stable compared to 2023), severe grade 3 DD, elevated filling pressure, mod-severe MR    Problem/Plan #3: Atrial Fibrillation  - c/w amiodarone 100mg PO daily  - c/w Toprol 12.5mg PO daily  - c/w Eliquis 2.5mg PO BID    Problem/Plan #4: Influenza A  - c/w tamiflu  - c/w Abx as per primary team  - c/w supplemental oxygen and wean as tolerated      THIAGO Holland DO [facility]  Cardiovascular Medicine  [address] [place] [address], [address]  Available through call or text on [employer] TEAM[employer]  Office: [phone]     DATE OF SERVICE: 02-07-25 @ 09:06    Patient is a 89y old  Female who presents with a chief complaint of CP (06 Feb 2025 11:35)      INTERVAL HISTORY: in no acute distress, feels okay, denies SOB/CP    REVIEW OF SYSTEMS:  CONSTITUTIONAL: No weakness  EYES/ENT: No visual changes;  No throat pain   NECK: No pain or stiffness  RESPIRATORY: No cough, wheezing; No shortness of breath  CARDIOVASCULAR: No chest pain or palpitations  GASTROINTESTINAL: No abdominal  pain. No nausea, vomiting, or hematemesis  GENITOURINARY: No dysuria, frequency or hematuria  NEUROLOGICAL: No stroke like symptoms  SKIN: No rashes    TELEMETRY Personally reviewed: SR 60s  	  MEDICATIONS:  aMIOdarone    Tablet 100 milliGRAM(s) Oral daily  metoprolol succinate ER 12.5 milliGRAM(s) Oral daily  spironolactone 12.5 milliGRAM(s) Oral daily        PHYSICAL EXAM:  T(C): 36.3 (02-07-25 @ 04:59), Max: 36.9 (02-06-25 @ 18:53)  HR: 66 (02-07-25 @ 04:59) (64 - 72)  BP: 94/60 (02-07-25 @ 04:59) (93/58 - 104/68)  RR: 17 (02-07-25 @ 04:59) (17 - 20)  SpO2: 97% (02-07-25 @ 04:59) (96% - 100%)  Wt(kg): --  I&O's Summary        Appearance: In no distress	  HEENT:    PERRL, EOMI	  Cardiovascular:  S1 S2, No JVD  Respiratory: Lungs clear to auscultation	  Gastrointestinal:  Soft, Non-tender, + BS	  Vascularature:  No edema of LE  Psychiatric: Appropriate affect   Neuro: no acute focal deficits                               11.2   5.50  )-----------( 161      ( 06 Feb 2025 06:34 )             35.4     02-07    141  |  104  |  26[H]  ----------------------------<  80  3.9   |  22  |  1.19    Ca    8.5      07 Feb 2025 07:25  Mg     2.5     02-07    TPro  7.5  /  Alb  4.0  /  TBili  0.2  /  DBili  x   /  AST  25  /  ALT  18  /  AlkPhos  57  02-05        Labs personally reviewed    < from: TTE W or WO Ultrasound Enhancing Agent (02.06.25 @ 16:01) >  CONCLUSIONS:      1. Left ventricular cavity is severely dilated. Left ventricular systolic function is mildly decreased with an ejection fraction of 41 % by Little's method of disks. Regional wall motion abnormalities present.   2. There is severe (grade 3) left ventricular diastolic dysfunction, with elevated left ventricular filling pressure.   3. Normal right ventricular cavity size and normal right ventricular systolic function.   4. Moderate to severe mitral regurgitation.   5. Estimated pulmonary artery systolic pressure is 44 mmHg, consistent with mild pulmonary hypertension.   6. Compared to the transthoracic echocardiogram performed on 7/10/2023, there is an increase in the degree of mitral regurgitation and grade 3 diastolic dysfunction.      ASSESSMENT/PLAN: 	    89F pmh HFrEF(35%), Afib on eliquis, CRT-D, HTN, hypothyroid coming in after recent bout of URI/pneumonia s/p course oral antibiotics.  Symptoms resumed few days ago and today she developed lower chest discomfort about 2 cm above her lower border of the sternum. Now unable to tolerate PO, everything she eats even liquids hurts her substantially.     Problem/Plan #1: Chest Pain  - Will review ECG although given she has CRT may not be diagnostic for ischemia  - Trop 45-->47 similar to prior admission  - BNP 5398 (higher than prior admissions)  - Prior TTE shows EF 45-50%, + WMA; Will repeat   - CT shows B/L GGOs, septal thickening, B/L patchy consolidation, thickening of distal esophagus  - Further recs pending review of TTE; Symptoms likely 2/2 ASHF vs GERD  --- s/p Lasix 40mg IV x 1   - Will assess need for further diuretics daily pending symptoms, stability of BP, Cr and UOP  - TTE 2/6 - EF 41%, WMA (stable compared to 2023), severe grade 3 DD, elevated filling pressure, mod-severe MR    Problem/Plan #2: Acute Systolic Heart Failure  - s/p CRT  - CXR with no cardiopulmonary dysfunction  - CT shows B/L GGOs, septal thickening, B/L patchy consolidation, thickening of distal esophagus  - BNP 5398  - c/w sHF GDMT:  --- Toprol 12.5mg PO daily  --- Spironolactone 12.5mg PO daily  --- Recommend resuming Jardiance (home dose)    - s/p Lasix 40mg IV x 1 on 2/6  - Will assess need for further diuretics daily pending symptoms, stability of BP, Cr and UOP  - Monitor Strict I&Os, daily standing weights (patient reports dry weight ~93lbs)  - TTE 2/6 - EF 41%, WMA (stable compared to 2023), severe grade 3 DD, elevated filling pressure, mod-severe MR    Problem/Plan #3: Atrial Fibrillation  - c/w amiodarone 100mg PO daily  - c/w Toprol 12.5mg PO daily  - c/w Eliquis 2.5mg PO BID    Problem/Plan #4: Influenza A  - c/w tamiflu  - c/w Abx as per primary team  - c/w supplemental oxygen and wean as tolerated      THIAGO Holland DO [facility]  Cardiovascular Medicine  [address] [place] [address], [address]  Available through call or text on [employer] TEAM[employer]  Office: [phone]     DATE OF SERVICE: 02-07-25 @ 09:06    Patient is a 89y old  Female who presents with a chief complaint of CP (06 Feb 2025 11:35)      INTERVAL HISTORY: in no acute distress, feels okay, denies SOB/CP    REVIEW OF SYSTEMS:  CONSTITUTIONAL: No weakness  EYES/ENT: No visual changes;  No throat pain   NECK: No pain or stiffness  RESPIRATORY: No cough, wheezing; No shortness of breath  CARDIOVASCULAR: No chest pain or palpitations  GASTROINTESTINAL: No abdominal  pain. No nausea, vomiting, or hematemesis  GENITOURINARY: No dysuria, frequency or hematuria  NEUROLOGICAL: No stroke like symptoms  SKIN: No rashes    TELEMETRY Personally reviewed: SR 60s  	  MEDICATIONS:  aMIOdarone    Tablet 100 milliGRAM(s) Oral daily  metoprolol succinate ER 12.5 milliGRAM(s) Oral daily  spironolactone 12.5 milliGRAM(s) Oral daily        PHYSICAL EXAM:  T(C): 36.3 (02-07-25 @ 04:59), Max: 36.9 (02-06-25 @ 18:53)  HR: 66 (02-07-25 @ 04:59) (64 - 72)  BP: 94/60 (02-07-25 @ 04:59) (93/58 - 104/68)  RR: 17 (02-07-25 @ 04:59) (17 - 20)  SpO2: 97% (02-07-25 @ 04:59) (96% - 100%)  Wt(kg): --  I&O's Summary        Appearance: In no distress	  HEENT:    PERRL, EOMI	  Cardiovascular:  S1 S2, No JVD  Respiratory: Lungs clear to auscultation	  Gastrointestinal:  Soft, Non-tender, + BS	  Vascularature:  No edema of LE  Psychiatric: Appropriate affect   Neuro: no acute focal deficits                               11.2   5.50  )-----------( 161      ( 06 Feb 2025 06:34 )             35.4     02-07    141  |  104  |  26[H]  ----------------------------<  80  3.9   |  22  |  1.19    Ca    8.5      07 Feb 2025 07:25  Mg     2.5     02-07    TPro  7.5  /  Alb  4.0  /  TBili  0.2  /  DBili  x   /  AST  25  /  ALT  18  /  AlkPhos  57  02-05        Labs personally reviewed    < from: TTE W or WO Ultrasound Enhancing Agent (02.06.25 @ 16:01) >  CONCLUSIONS:      1. Left ventricular cavity is severely dilated. Left ventricular systolic function is mildly decreased with an ejection fraction of 41 % by Little's method of disks. Regional wall motion abnormalities present.   2. There is severe (grade 3) left ventricular diastolic dysfunction, with elevated left ventricular filling pressure.   3. Normal right ventricular cavity size and normal right ventricular systolic function.   4. Moderate to severe mitral regurgitation.   5. Estimated pulmonary artery systolic pressure is 44 mmHg, consistent with mild pulmonary hypertension.   6. Compared to the transthoracic echocardiogram performed on 7/10/2023, there is an increase in the degree of mitral regurgitation and grade 3 diastolic dysfunction.          ASSESSMENT/PLAN: 	    89F pmh HFrEF(35%), Afib on eliquis, CRT-D, HTN, hypothyroid coming in after recent bout of URI/pneumonia s/p course oral antibiotics.  Symptoms resumed few days ago and today she developed lower chest discomfort about 2 cm above her lower border of the sternum. Now unable to tolerate PO, everything she eats even liquids hurts her substantially.     Problem/Plan #1: Chest Pain  - Will review ECG although given she has CRT may not be diagnostic for ischemia  - Trop 45-->47 similar to prior admission     - Prior TTE shows EF 45-50%, + WMA; Will repeat   - CT shows B/L GGOs, septal thickening, B/L patchy consolidation, thickening of distal esophagus  - TTE 2/6 - EF 41%, WMA (stable compared to 2023), severe grade 3 DD, elevated filling pressure, mod-severe MR  - Resume Lasix 40mg IV daily    Problem/Plan #2: Acute Systolic Heart Failure  - s/p CRT  - Prior TTE shows EF 45-50%, + WMA; Will repeat   - CT shows B/L GGOs, septal thickening, B/L patchy consolidation, thickening of distal esophagus  - TTE 2/6 - EF 41%, WMA (stable compared to 2023), severe grade 3 DD, significant elevated filling pressure, mod-severe MR  - Resume Lasix 40mg IV daily  - c/w [facility] GDMT:  --- Toprol 12.5mg PO daily  --- Spironolactone 12.5mg PO daily  --- Recommend resuming Jardiance (home dose)       Problem/Plan #3: Atrial Fibrillation  - c/w amiodarone 100mg PO daily  - c/w Toprol 12.5mg PO daily  - c/w Eliquis 2.5mg PO BID    Problem/Plan #4: Influenza A  - c/w tamiflu  - c/w Abx as per primary team  - c/w supplemental oxygen and wean as tolerated        TIHAGO Holland DO [facility]  Cardiovascular Medicine  [address] [place] [address], [address]  Available through call or text on [employer] TE[employer]  Office: [phone]     DATE OF SERVICE: 02-07-25 @ 09:06    Patient is a 89y old  Female who presents with a chief complaint of CP (06 Feb 2025 11:35)      INTERVAL HISTORY: in no acute distress, feels okay, denies SOB/CP    REVIEW OF SYSTEMS:  CONSTITUTIONAL: No weakness  EYES/ENT: No visual changes;  No throat pain   NECK: No pain or stiffness  RESPIRATORY: No cough, wheezing; No shortness of breath  CARDIOVASCULAR: No chest pain or palpitations  GASTROINTESTINAL: No abdominal  pain. No nausea, vomiting, or hematemesis  GENITOURINARY: No dysuria, frequency or hematuria  NEUROLOGICAL: No stroke like symptoms  SKIN: No rashes    TELEMETRY Personally reviewed: SR 60s  	  MEDICATIONS:  aMIOdarone    Tablet 100 milliGRAM(s) Oral daily  metoprolol succinate ER 12.5 milliGRAM(s) Oral daily  spironolactone 12.5 milliGRAM(s) Oral daily        PHYSICAL EXAM:  T(C): 36.3 (02-07-25 @ 04:59), Max: 36.9 (02-06-25 @ 18:53)  HR: 66 (02-07-25 @ 04:59) (64 - 72)  BP: 94/60 (02-07-25 @ 04:59) (93/58 - 104/68)  RR: 17 (02-07-25 @ 04:59) (17 - 20)  SpO2: 97% (02-07-25 @ 04:59) (96% - 100%)  Wt(kg): --  I&O's Summary        Appearance: In no distress	  HEENT:    PERRL, EOMI	  Cardiovascular:  S1 S2, No JVD  Respiratory: Lungs clear to auscultation	  Gastrointestinal:  Soft, Non-tender, + BS	  Vascularature:  No edema of LE  Psychiatric: Appropriate affect   Neuro: no acute focal deficits                               11.2   5.50  )-----------( 161      ( 06 Feb 2025 06:34 )             35.4     02-07    141  |  104  |  26[H]  ----------------------------<  80  3.9   |  22  |  1.19    Ca    8.5      07 Feb 2025 07:25  Mg     2.5     02-07    TPro  7.5  /  Alb  4.0  /  TBili  0.2  /  DBili  x   /  AST  25  /  ALT  18  /  AlkPhos  57  02-05        Labs personally reviewed    < from: TTE W or WO Ultrasound Enhancing Agent (02.06.25 @ 16:01) >  CONCLUSIONS:      1. Left ventricular cavity is severely dilated. Left ventricular systolic function is mildly decreased with an ejection fraction of 41 % by Little's method of disks. Regional wall motion abnormalities present.   2. There is severe (grade 3) left ventricular diastolic dysfunction, with elevated left ventricular filling pressure.   3. Normal right ventricular cavity size and normal right ventricular systolic function.   4. Moderate to severe mitral regurgitation.   5. Estimated pulmonary artery systolic pressure is 44 mmHg, consistent with mild pulmonary hypertension.   6. Compared to the transthoracic echocardiogram performed on 7/10/2023, there is an increase in the degree of mitral regurgitation and grade 3 diastolic dysfunction.          ASSESSMENT/PLAN: 	    89F pmh HFrEF(35%), Afib on eliquis, CRT-D, HTN, hypothyroid coming in after recent bout of URI/pneumonia s/p course oral antibiotics.  Symptoms resumed few days ago and today she developed lower chest discomfort about 2 cm above her lower border of the sternum. Now unable to tolerate PO, everything she eats even liquids hurts her substantially.     Problem/Plan #1: Chest Pain  - Will review ECG although given she has CRT may not be diagnostic for ischemia  - Trop 45-->47 similar to prior admission    Problem/Plan #2: Acute Systolic Heart Failure  - s/p CRT  - Prior TTE shows EF 45-50%, + WMA; Will repeat   - CT shows B/L GGOs, septal thickening, B/L patchy consolidation, thickening of distal esophagus  - TTE 2/6 - EF 41%, WMA (stable compared to 2023), severe grade 3 DD, significant elevated filling pressure, mod-severe MR  - Resume Lasix 40mg IV daily  - c/w sHF GDMT:  --- Toprol 12.5mg PO daily  --- Spironolactone 12.5mg PO daily  --- Recommend resuming Jardiance (home dose)       Problem/Plan #3: Atrial Fibrillation  - c/w amiodarone 100mg PO daily  - c/w Toprol 12.5mg PO daily  - c/w Eliquis 2.5mg PO BID    Problem/Plan #4: Influenza A  - c/w tamiflu  - c/w Abx as per primary team  - c/w supplemental oxygen and wean as tolerated        THIAGO Holland DO [facility]  Cardiovascular Medicine  [address] [place] [address], [address]  Available through call or text on [employer] TE[employer]  Office: [phone]

## 2025-02-07 NOTE — DIETITIAN INITIAL EVALUATION ADULT - PROBLEM SELECTOR PLAN 1
- P/w CP iso flu like sx, found to be Flu A+  - EKG: Vpaced   - Trop:  47 > 45,  BNP: 5398 ( hx/o CHF rEF w/ acute illness)     - CXR: no acute cardiopulmonary disease  - CT chest: b/l GGO & patchy consolidation    - ACS unlikely. CP could me 2/2 acute URI but pt high risk and will benefit from further cardiac eval   - Check echo (ordered)   - Tele monitoring   - if pt with new or worsening chest pain, stat trop/ekg, call cardiology  - Chest discomfort could also be component of GERD as pt reports discomfort in " lower chest / esophagus" with intake. Will given pantoprazole, Carafate. If sx persist consider CT A/P, GI eval

## 2025-02-07 NOTE — DIETITIAN INITIAL EVALUATION ADULT - PERTINENT LABORATORY DATA
02-07    141  |  104  |  26[H]  ----------------------------<  80  3.9   |  22  |  1.19    Ca    8.5      07 Feb 2025 07:25  Mg     2.5     02-07    TPro  7.5  /  Alb  4.0  /  TBili  0.2  /  DBili  x   /  AST  25  /  ALT  18  /  AlkPhos  57  02-05

## 2025-02-07 NOTE — PROGRESS NOTE ADULT - SUBJECTIVE AND OBJECTIVE BOX
Follow-up Pulm Progress Note    feeling better overall  o2 sats low 90s on RA  denies CP     Medications:  MEDICATIONS  (STANDING):  albuterol/ipratropium for Nebulization 3 milliLiter(s) Nebulizer every 6 hours  aluminum hydroxide/magnesium hydroxide/simethicone Suspension 30 milliLiter(s) Oral once  aMIOdarone    Tablet 100 milliGRAM(s) Oral daily  apixaban 2.5 milliGRAM(s) Oral every 12 hours  azithromycin  IVPB 500 milliGRAM(s) IV Intermittent every 24 hours  cefTRIAXone   IVPB 1000 milliGRAM(s) IV Intermittent every 24 hours  guaiFENesin Oral Liquid (Sugar-Free) 100 milliGRAM(s) Oral every 6 hours  levothyroxine 112 MICROGram(s) Oral daily  metoprolol succinate ER 12.5 milliGRAM(s) Oral daily  oseltamivir 30 milliGRAM(s) Oral daily  pantoprazole    Tablet 40 milliGRAM(s) Oral before breakfast  sodium chloride 0.9%. 1000 milliLiter(s) (50 mL/Hr) IV Continuous <Continuous>  spironolactone 12.5 milliGRAM(s) Oral daily  sucralfate 1 Gram(s) Oral two times a day    MEDICATIONS  (PRN):  acetaminophen     Tablet .. 650 milliGRAM(s) Oral every 6 hours PRN Temp greater or equal to 38C (100.4F), Mild Pain (1 - 3)  melatonin 3 milliGRAM(s) Oral at bedtime PRN Insomnia  ondansetron Injectable 4 milliGRAM(s) IV Push every 8 hours PRN Nausea and/or Vomiting          Vital Signs Last 24 Hrs  T(C): 36.6 (07 Feb 2025 12:03), Max: 36.9 (06 Feb 2025 18:53)  T(F): 97.9 (07 Feb 2025 12:03), Max: 98.4 (06 Feb 2025 18:53)  HR: 69 (07 Feb 2025 12:03) (66 - 72)  BP: 95/90 (07 Feb 2025 12:03) (94/60 - 104/68)  BP(mean): --  RR: 18 (07 Feb 2025 12:03) (17 - 20)  SpO2: 92% (07 Feb 2025 12:03) (92% - 97%)    Parameters below as of 07 Feb 2025 12:03  Patient On (Oxygen Delivery Method): room air          VBG pH 7.36 02-05 @ 15:52    VBG pCO2 46 02-05 @ 15:52    VBG O2 sat 22.3 02-05 @ 15:52    VBG lactate 1.4 02-05 @ 15:52            LABS:                        11.2   5.50  )-----------( 161      ( 06 Feb 2025 06:34 )             35.4     02-07    141  |  104  |  26[H]  ----------------------------<  80  3.9   |  22  |  1.19    Ca    8.5      07 Feb 2025 07:25  Mg     2.5     02-07    TPro  7.5  /  Alb  4.0  /  TBili  0.2  /  DBili  x   /  AST  25  /  ALT  18  /  AlkPhos  57  02-05          CAPILLARY BLOOD GLUCOSE        PT/INR - ( 06 Feb 2025 06:34 )   PT: 14.1 sec;   INR: 1.23 ratio         PTT - ( 05 Feb 2025 15:52 )  PTT:33.9 sec  Urinalysis Basic - ( 07 Feb 2025 07:25 )    Color: x / Appearance: x / SG: x / pH: x  Gluc: 80 mg/dL / Ketone: x  / Bili: x / Urobili: x   Blood: x / Protein: x / Nitrite: x   Leuk Esterase: x / RBC: x / WBC x   Sq Epi: x / Non Sq Epi: x / Bacteria: x      Procalcitonin: 0.16 ng/mL (02-05-25 @ 15:52)                  CULTURES:     Culture - Blood (collected 02-05-25 @ 20:00)  Source: .Blood BLOOD  Preliminary Report (02-07-25 @ 01:02):    No growth at 24 hours    Culture - Blood (collected 02-05-25 @ 19:45)  Source: .Blood BLOOD  Preliminary Report (02-07-25 @ 01:02):    No growth at 24 hours            Physical Examination:  PULM: minimal forced end expiratory wheeze bilaterally, few scattered rhonchi   CVS: S1, S2 heard    ADIOLOGY REVIEWED    CT chest:  < from: CT Chest No Cont (02.05.25 @ 18:11) >    FINDINGS:    Tubes/Lines: None.    Lungs, airways and pleura: Bilateral septal thickening and groundglass   opacities. There are patchy foci consolidation, some which are branching   in the bilateral lower lobes. The largest opacities in the left lower   lobe measures 1.2 x 0.7 cm (series 4 image 120).    There are secretions in the posterior segmental bronchi of the right   lower lobe. There are secretions in the trachea and right mainstem   bronchus. No pneumothorax. Scant bilateral pleural effusions..    Mediastinum: The distal esophagus is thickened.    The thyroid gland is not visualized. The chest lymph nodes measure less   than 10 mm in the short axis.    Left-sided aortic arch and left-sided descending thoracic aorta. Aortic   calcifications. The heart is enlarged. Biventricular AICD.    Upper Abdomen: The upper abdomen is unremarkable.    Bones And Soft Tissues: Spondylosis of the thoracic spine.  The soft   tissues are unremarkable.      IMPRESSION:    1.  Bilateral groundglass opacities and septal thickening are of   uncertain etiology.  2.  Bilateral patchy consolidation, some which are branching in the lower   lobes are of uncertain etiology.  3.  The distal esophagus is thickened.  4.  CT chest without contrast in 8 weeks is recommended to evaluate for   resolution/change.    --- End of Report ---    < end of copied text >

## 2025-02-07 NOTE — PROGRESS NOTE ADULT - SUBJECTIVE AND OBJECTIVE BOX
Patient is a 89y old  Female who presents with a chief complaint of Acute respiratory failure with hypoxia    Chart reviewed, events noted. This is a "89F pmh HFrEF(35%), Afib on eliquis, CRT-D, HTN, hypothyroid coming in after recent bout of URI/pneumonia s/p course oral antibiotics.  Symptoms resumed few days ago and today she developed lower chest discomfort about 2 cm above her lower border of the sternum. Now unable to tolerate PO, everything she eats even liquids hurts her substantially." (07 Feb 2025 14:34)      INTERVAL HPI/OVERNIGHT EVENTS: seen and examined, NAD   T(C): 36.6 (02-07-25 @ 12:03), Max: 36.6 (02-07-25 @ 12:03)  HR: 69 (02-07-25 @ 12:03) (66 - 69)  BP: 95/90 (02-07-25 @ 12:03) (94/60 - 95/90)  RR: 18 (02-07-25 @ 12:03) (17 - 18)  SpO2: 92% (02-07-25 @ 12:03) (92% - 97%)  Wt(kg): --  I&O's Summary    07 Feb 2025 07:01  -  07 Feb 2025 21:53  --------------------------------------------------------  IN: 480 mL / OUT: 100 mL / NET: 380 mL        PAST MEDICAL & SURGICAL HISTORY:  H/O: hypothyroidism      Hypercholesteremia      Thyroid ca      Lupus      Meniere disease      Anemia      Bundle branch block, left      Bleeding hemorrhoid      Left ovarian cyst      S/P thyroidectomy  1981      History of appendectomy  1939      History of abdominal hysterectomy  1970      S/P breast biopsy  Right breast benign cyst removed.      Cataract cortical, senile  s/p extraction right eye 12/13          SOCIAL HISTORY  Alcohol:  Tobacco:  Illicit substance use:    FAMILY HISTORY:    REVIEW OF SYSTEMS:  CONSTITUTIONAL: No fever, weight loss, or fatigue  EYES: No eye pain, visual disturbances, or discharge  ENMT:  No difficulty hearing, tinnitus, vertigo; No sinus or throat pain  NECK: No pain or stiffness  RESPIRATORY: No cough, wheezing, chills or hemoptysis; No shortness of breath  CARDIOVASCULAR: No chest pain, palpitations, dizziness, or leg swelling  GASTROINTESTINAL: No abdominal or epigastric pain. No nausea, vomiting, or hematemesis; No diarrhea or constipation. No melena or hematochezia.  GENITOURINARY: No dysuria, frequency, hematuria, or incontinence  NEUROLOGICAL: No headaches, memory loss, loss of strength, numbness, or tremors  SKIN: No itching, burning, rashes, or lesions   LYMPH NODES: No enlarged glands  ENDOCRINE: No heat or cold intolerance; No hair loss  MUSCULOSKELETAL: No joint pain or swelling; No muscle, back, or extremity pain  PSYCHIATRIC: No depression, anxiety, mood swings, or difficulty sleeping  HEME/LYMPH: No easy bruising, or bleeding gums  ALLERY AND IMMUNOLOGIC: No hives or eczema    RADIOLOGY & ADDITIONAL TESTS:    Imaging Personally Reviewed:  [ ] YES  [ ] NO    Consultant(s) Notes Reviewed:  [ ] YES  [ ] NO    PHYSICAL EXAM:  GENERAL: NAD, well-groomed, well-developed  HEAD:  Atraumatic, Normocephalic  EYES: EOMI, PERRLA, conjunctiva and sclera clear  ENMT: No tonsillar erythema, exudates, or enlargement; Moist mucous membranes, Good dentition, No lesions  NECK: Supple, No JVD, Normal thyroid  NERVOUS SYSTEM:  Alert & Oriented X3, Good concentration; Motor Strength 5/5 B/L upper and lower extremities; DTRs 2+ intact and symmetric  CHEST/LUNG: Clear to percussion bilaterally; No rales, rhonchi, wheezing, or rubs  HEART: Regular rate and rhythm; No murmurs, rubs, or gallops  ABDOMEN: Soft, Nontender, Nondistended; Bowel sounds present  EXTREMITIES:  2+ Peripheral Pulses, No clubbing, cyanosis, or edema  LYMPH: No lymphadenopathy noted  SKIN: No rashes or lesions    LABS:                        11.2   5.50  )-----------( 161      ( 06 Feb 2025 06:34 )             35.4     02-07    141  |  104  |  26[H]  ----------------------------<  80  3.9   |  22  |  1.19    Ca    8.5      07 Feb 2025 07:25  Mg     2.5     02-07      PT/INR - ( 06 Feb 2025 06:34 )   PT: 14.1 sec;   INR: 1.23 ratio           Urinalysis Basic - ( 07 Feb 2025 07:25 )    Color: x / Appearance: x / SG: x / pH: x  Gluc: 80 mg/dL / Ketone: x  / Bili: x / Urobili: x   Blood: x / Protein: x / Nitrite: x   Leuk Esterase: x / RBC: x / WBC x   Sq Epi: x / Non Sq Epi: x / Bacteria: x      CAPILLARY BLOOD GLUCOSE            Urinalysis Basic - ( 07 Feb 2025 07:25 )    Color: x / Appearance: x / SG: x / pH: x  Gluc: 80 mg/dL / Ketone: x  / Bili: x / Urobili: x   Blood: x / Protein: x / Nitrite: x   Leuk Esterase: x / RBC: x / WBC x   Sq Epi: x / Non Sq Epi: x / Bacteria: x        MEDICATIONS  (STANDING):  albuterol/ipratropium for Nebulization 3 milliLiter(s) Nebulizer every 6 hours  aluminum hydroxide/magnesium hydroxide/simethicone Suspension 30 milliLiter(s) Oral once  aMIOdarone    Tablet 100 milliGRAM(s) Oral daily  apixaban 2.5 milliGRAM(s) Oral every 12 hours  azithromycin  IVPB 500 milliGRAM(s) IV Intermittent every 24 hours  cefTRIAXone   IVPB 1000 milliGRAM(s) IV Intermittent every 24 hours  furosemide   Injectable 40 milliGRAM(s) IV Push daily  guaiFENesin Oral Liquid (Sugar-Free) 100 milliGRAM(s) Oral every 6 hours  levothyroxine 112 MICROGram(s) Oral daily  metoprolol succinate ER 12.5 milliGRAM(s) Oral daily  oseltamivir 30 milliGRAM(s) Oral daily  pantoprazole    Tablet 40 milliGRAM(s) Oral before breakfast  sodium chloride 0.9%. 1000 milliLiter(s) (50 mL/Hr) IV Continuous <Continuous>  spironolactone 12.5 milliGRAM(s) Oral daily  sucralfate 1 Gram(s) Oral two times a day    MEDICATIONS  (PRN):  acetaminophen     Tablet .. 650 milliGRAM(s) Oral every 6 hours PRN Temp greater or equal to 38C (100.4F), Mild Pain (1 - 3)  melatonin 3 milliGRAM(s) Oral at bedtime PRN Insomnia  ondansetron Injectable 4 milliGRAM(s) IV Push every 8 hours PRN Nausea and/or Vomiting      Care Discussed with Consultants/Other Providers [ ] YES  [ ] NO

## 2025-02-07 NOTE — DIETITIAN INITIAL EVALUATION ADULT - ADD RECOMMEND
1) Continue DASH diet per pt preference. 2) Encourage PO intake of protein-rich foods, RD to add yogurt, pt declining supplements. 3) RD to remain available and follow-up as medically appropriate.

## 2025-02-07 NOTE — DIETITIAN INITIAL EVALUATION ADULT - ORAL INTAKE PTA/DIET HISTORY
ONCOLOGY/HEMATOLOGY OUTPATIENT OFFICE VISIT  Patient: Kendrick Ngo   MRN: [id number]  YOB: 1929  Date of Visit: 9/21/2017         CLINIC:    [address]  [place] [id number] [phone]    Note Type:  Office visit       CHIEF COMPLAINT: Colorectal carcinoma    HISTORY OF PRESENT ILLNESS:  Mr. Kendrick Ngo was seen at [facility] for hematology/medical oncology consultation for the above reason.  He has the following hematological/oncological history:    #1. 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 He did crossed for lanes and went into a date January. Transfuse 1 unit of packed red blood cells and admitted. On April 6, 2017 EGD was performed which was normal. A colonoscopy was performed on the same day, procedure was unsuccessful due to extremely poor prep and very long and redundant colon.      #2. On May 30, 2017 he had a repeat colonoscopy which noted an malignant appearing mass in the ascending colon, biopsies were taken. 10 cm distal to this mass was a 7 mm sessile polyp. Examination up to the cecum. Biopsies from the ascending colon mass returned as invasive moderately differentiated adenocarcinoma. IHC instability with ML H1 deficient, PMS 2 deficient    Polyp returned with colonic mucosa with at least severe dysplasia (intramucosal adenocarcinoma).      #3. Patient returned to the emergency room on May 10 to being instructed to return to the emergency room for severe anemia, hemoglobin was 7.2 g/dL, hematocrit was 24.5, MCV was 60.9, platelet count was 413,000, white blood count was normal at 7.9 with normal differential. Liver function studies were normal, creatinine was 1.42.    #4. A CAT scan of the chest, abdomen, pelvis on May 10, 2017 noted an ovoid shaped soft tissue density  within the ascending colon, maximum axial dimension of 4.5 cm, Navelbine enlarged and millimeter lymph node. The head of the pancreas there was cystic density contiguous with the data dimension of 17 mm. In addition there were 2 small solid lung nodules both the left lung largest measuring 3 mm. There was a tiny left pleural effusion.    #5. He states that over the last 3 months his bowels changed, these were more pellet shaped and firm. He does not report any bleeding. He's had no recent weight loss. He denies any abdominal pain.    #6. Patient underwent a laparoscopic right hemicolectomy June 6, 2017: Pathology noted moderately differentiated invasive colonic adenocarcinoma, margins were negative. The primary tumor was a pT2: Tumor invaded muscularis propria. Lymphovascular invasion was present, perineural invasion was not identified. Total of 37 lymph nodes were examined of which 2 were involved with tumor. He was staged as a pT2 pN 1 a stage IIIa    The patient did have IHC screening for DNA mismatch repair deficiency, ML H1 was noted to be abnormal (deficient), PMS 2 was deficient. 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 He has now regular stools. He denies any blood per rectum or melena. He's gained 7 pounds in weight. He's active and lives at home with his wife.    Past Medical History:   Diagnosis Date   • Anxiety    • Essential (primary) hypertension    • High cholesterol    • Malignant neoplasm of ascending colon (CMS/HCC) 5/25/2017   • Prostate disorder        Past Surgical History:   Procedure Laterality Date   • ANES DISCISSION SECNDRY MEMBRN CATARACT; Bilateral 01/25/2008   •  CYSTOURETHROSCOPY  02/21/2006   • HEMICOLECTOMY Right 06/06/2017   • REPAIR UMBILICAL YEIMY,5+Y/O,REDUC  02/25/2003    Hernia, umbilical, >4 yrs of age   • REVISION OF UPPER EYELID Bilateral     lift   • TONSILLECTOMY         ALLERGIES:  Zestril [lisinopril]    MEDICATIONS:  Current Outpatient Prescriptions   Medication Sig Dispense Refill   • pravastatin (PRAVACHOL) 40 MG tablet Take 1 tablet by mouth daily.     • metoPROLOL (LOPRESSOR) 25 MG tablet Take 1 tablet by mouth every 12 hours. 180 tablet 1   • dilTIAZem (TIAZAC) 240 MG 24 hr capsule Take 1 capsule by mouth daily. 30 capsule 0   • sertraline (ZOLOFT) 25 MG tablet Take 1 tablet by mouth daily. 30 tablet 0   • triamterene-hydrochlorothiazide (MAXZIDE) 75-50 MG per tablet Take 1 tablet by mouth daily. (Patient taking differently: Take 1 tablet by mouth daily. Hold as of 9/6/17) 100 tablet 4   • CALCIUM CARBONATE PO Take 1-2 tablets by mouth as needed.     • finasteride (PROSCAR) 5 MG tablet Take 1 tablet by mouth daily. 90 tablet 3   • aspirin 81 MG tablet Take 81 mg by mouth daily.     • fish oil-omega-3 fatty acids 1000 MG CAPS Take 1 capsule by mouth 2 times daily.      • cholecalciferol (VITAMIN D3) 1000 UNITS tablet Take 1,000 Units by mouth daily.     • Multiple Vitamins-Minerals (MULTIVITAMIN PO) Take 1 tablet by mouth daily.      • potassium chloride (K-DUR, KLOR-CON) 10 MEQ CR tablet Take 10 mEq by mouth daily. Hold as of 9/6/17     • ALPRAZolam (XANAX) 0.5 MG tablet Take 0.5 mg by mouth 3 times daily as needed.       No current facility-administered medications for this visit.        Social History     Social History   • Marital status:      Spouse name: N/A   • Number of children: 1   • Years of education: N/A     Occupational History   • retired      Social History Main Topics   • Smoking status: Former Smoker     Types: Cigarettes     Quit date: 1/1/1950   • Smokeless tobacco: Never Used   • Alcohol use No   • Drug use: No   • Sexual  activity: Not on file     Other Topics Concern   • Not on file     Social History Narrative   • No narrative on file       Family History   Problem Relation Age of Onset   • Heart disease Mother    • Cancer Father      leukemia   • Heart disease Sister    • Heart disease Brother    • Cancer Brother      skin       REVIEW OF SYSTEMS:   The review of systems from the medical assistant was reviewed. A 12 point review of systems was performed independently and pertinent positives and negatives are noted in the history of present illness.          PHYSICAL EXAMINATION:    The patient is a 88 year old male in no apparent distress.  VITALS:      Visit Vitals  /67   Pulse 55   Temp 97.9 °F (36.6 °C) (Oral)   Resp 14   Wt 86.6 kg   SpO2 95%   BMI 30.83 kg/m²     ECOG performance score as noted in EMR (electronic medical record)  GENERAL:  Not in any apparent distress, looks stated age  HEENT:  Anicteric with conjugate gaze  NECK:  Supple  HEART: Regular heart rate with no murmurs rubs gallops  LUNGS: Clear to auscultation bilaterally with no wheezing or crackles  ABDOMEN: Soft incisions are clean, bowel sounds are normal , no palpable masses  SKIN:  No rashes on visible skin surfaces  PSYCHIATRIC:  Appears to have normal insight, memory and mood  NEUROLOGICAL:  Normal gait. Normal speech      LABS:  CBC with differential and CMP from September 6, 2017 reviewed      PATHOLOGICAL DATA:    As noted in the history of present illness    ASSESSMENT:    1. Adenocarcinoma of the ascending colon status post hemicolectomy pT2 pN 1 M0 no adjuvant therapy  2. Cystic lesion in the pancreas  3. Equivocal pulmonary nodules  4. Anemia secondary to #1    PLAN/RECOMMENDATIONS:   1. He has no clinical evidence to suggest recurrent disease. We'll have repeat CT scan of the chest, abdomen and pelvis to follow-up on his disease status, pulmonary nodules and pancreatic cyst.  Symptoms of recurrent disease reviewed with the patient    2.  Anemia patient was advised to have iron studies performed today. We did  the patient on his renal function and likely association with his anemia.    3. We discussed advanced directives and patient was given information    4. Follow-up after imaging studies in 3 months time      NCCN (National Comprehensive Cancer Network) guidelines were consulted in formulation of management plan.     Melba Alfred MD    25 minutes spent with this patient with more than 50% of the time in direct face to face counselling.     Pt reports good PO intake and appetite at baseline though reports she isn't the biggest eater. Consumes 3 meals day, reports heaviest meal is ~ lunch time to help with management of GERD. Follows a low sodium diet and limits intake of concentrated sweets due to prediabetes. Limits red meat to 1-2x per month. Reports HHA helps with grocery shopping, states she enjoys cooking. Confirms allergy to mushrooms and shellfish (hives). Pt denies chewing/swallowing difficulty, nausea, vomiting, diarrhea, constipation.

## 2025-02-07 NOTE — DIETITIAN INITIAL EVALUATION ADULT - ENERGY INTAKE
In-house pt reports good PO intake, obtained food preferences, will honor as able. Pt requesting removal of red meat as she doesn't consume it that often.  Fair (50-75%)

## 2025-02-07 NOTE — DIETITIAN INITIAL EVALUATION ADULT - OTHER INFO
Weight: pt reports weight stable for many years in the 90's, weighs self daily. Takes lasix every other day at home, states weight is usually between 93-94lbs depending on water weight. States she hasn't been able to gain weight, highest weight 106lbs (many years ago).  Current dosing weight is 119lbs --? accuracy    Current height noted as 5'2, per Health system height noted between 4'11-5'.

## 2025-02-07 NOTE — DIETITIAN INITIAL EVALUATION ADULT - EDUCATION DIETARY MODIFICATIONS
reinforced importance of daily weights, continuing to follow a low sodium diet./teach back/(2) meets goals/outcomes/verbalization

## 2025-02-07 NOTE — DIETITIAN INITIAL EVALUATION ADULT - REASON FOR ADMISSION
Acute respiratory failure with hypoxia    Chart reviewed, events noted. This is a "89F pmh HFrEF(35%), Afib on eliquis, CRT-D, HTN, hypothyroid coming in after recent bout of URI/pneumonia s/p course oral antibiotics.  Symptoms resumed few days ago and today she developed lower chest discomfort about 2 cm above her lower border of the sternum. Now unable to tolerate PO, everything she eats even liquids hurts her substantially."

## 2025-02-07 NOTE — DIETITIAN INITIAL EVALUATION ADULT - PROBLEM SELECTOR PLAN 3
- IV Lasix  - Metoprolol  - Spironolactone  - Jardiance to resume when off IV diuretics   - I&O, daily wt   - telemetry

## 2025-02-08 LAB
ANION GAP SERPL CALC-SCNC: 14 MMOL/L — SIGNIFICANT CHANGE UP (ref 5–17)
BUN SERPL-MCNC: 20 MG/DL — SIGNIFICANT CHANGE UP (ref 7–23)
CALCIUM SERPL-MCNC: 9 MG/DL — SIGNIFICANT CHANGE UP (ref 8.4–10.5)
CHLORIDE SERPL-SCNC: 103 MMOL/L — SIGNIFICANT CHANGE UP (ref 96–108)
CO2 SERPL-SCNC: 22 MMOL/L — SIGNIFICANT CHANGE UP (ref 22–31)
CREAT SERPL-MCNC: 1.09 MG/DL — SIGNIFICANT CHANGE UP (ref 0.5–1.3)
EGFR: 49 ML/MIN/1.73M2 — LOW
GLUCOSE SERPL-MCNC: 94 MG/DL — SIGNIFICANT CHANGE UP (ref 70–99)
HCT VFR BLD CALC: 38.1 % — SIGNIFICANT CHANGE UP (ref 34.5–45)
HGB BLD-MCNC: 11.8 G/DL — SIGNIFICANT CHANGE UP (ref 11.5–15.5)
MAGNESIUM SERPL-MCNC: 2.5 MG/DL — SIGNIFICANT CHANGE UP (ref 1.6–2.6)
MCHC RBC-ENTMCNC: 28.2 PG — SIGNIFICANT CHANGE UP (ref 27–34)
MCHC RBC-ENTMCNC: 31 G/DL — LOW (ref 32–36)
MCV RBC AUTO: 91.1 FL — SIGNIFICANT CHANGE UP (ref 80–100)
NRBC # BLD: 0 /100 WBCS — SIGNIFICANT CHANGE UP (ref 0–0)
NRBC BLD-RTO: 0 /100 WBCS — SIGNIFICANT CHANGE UP (ref 0–0)
PLATELET # BLD AUTO: 170 K/UL — SIGNIFICANT CHANGE UP (ref 150–400)
POTASSIUM SERPL-MCNC: 3.6 MMOL/L — SIGNIFICANT CHANGE UP (ref 3.5–5.3)
POTASSIUM SERPL-SCNC: 3.6 MMOL/L — SIGNIFICANT CHANGE UP (ref 3.5–5.3)
RBC # BLD: 4.18 M/UL — SIGNIFICANT CHANGE UP (ref 3.8–5.2)
RBC # FLD: 15.9 % — HIGH (ref 10.3–14.5)
SODIUM SERPL-SCNC: 139 MMOL/L — SIGNIFICANT CHANGE UP (ref 135–145)
WBC # BLD: 3.6 K/UL — LOW (ref 3.8–10.5)
WBC # FLD AUTO: 3.6 K/UL — LOW (ref 3.8–10.5)

## 2025-02-08 RX ORDER — DAPAGLIFLOZIN 5 MG/1
10 TABLET, FILM COATED ORAL DAILY
Refills: 0 | Status: DISCONTINUED | OUTPATIENT
Start: 2025-02-08 | End: 2025-02-11

## 2025-02-08 RX ADMIN — OSELTAMIVIR PHOSPHATE 30 MILLIGRAM(S): 75 CAPSULE ORAL at 11:55

## 2025-02-08 RX ADMIN — Medication 100 MILLIGRAM(S): at 17:06

## 2025-02-08 RX ADMIN — IPRATROPIUM BROMIDE AND ALBUTEROL SULFATE 3 MILLILITER(S): .5; 2.5 SOLUTION RESPIRATORY (INHALATION) at 06:29

## 2025-02-08 RX ADMIN — PANTOPRAZOLE 40 MILLIGRAM(S): 20 TABLET, DELAYED RELEASE ORAL at 06:28

## 2025-02-08 RX ADMIN — Medication 100 MILLIGRAM(S): at 06:28

## 2025-02-08 RX ADMIN — ACETAMINOPHEN 650 MILLIGRAM(S): 160 SUSPENSION ORAL at 01:16

## 2025-02-08 RX ADMIN — IPRATROPIUM BROMIDE AND ALBUTEROL SULFATE 3 MILLILITER(S): .5; 2.5 SOLUTION RESPIRATORY (INHALATION) at 11:56

## 2025-02-08 RX ADMIN — Medication 100 MILLIGRAM(S): at 00:21

## 2025-02-08 RX ADMIN — IPRATROPIUM BROMIDE AND ALBUTEROL SULFATE 3 MILLILITER(S): .5; 2.5 SOLUTION RESPIRATORY (INHALATION) at 00:14

## 2025-02-08 RX ADMIN — SUCRALFATE 1 GRAM(S): 1 SUSPENSION ORAL at 06:26

## 2025-02-08 RX ADMIN — AMIODARONE HYDROCHLORIDE 100 MILLIGRAM(S): 50 INJECTION, SOLUTION INTRAVENOUS at 06:27

## 2025-02-08 RX ADMIN — Medication 12.5 MILLIGRAM(S): at 06:26

## 2025-02-08 RX ADMIN — APIXABAN 2.5 MILLIGRAM(S): 5 TABLET, FILM COATED ORAL at 17:06

## 2025-02-08 RX ADMIN — Medication 100 MILLIGRAM(S): at 11:55

## 2025-02-08 RX ADMIN — Medication 12.5 MILLIGRAM(S): at 06:27

## 2025-02-08 RX ADMIN — SUCRALFATE 1 GRAM(S): 1 SUSPENSION ORAL at 17:07

## 2025-02-08 RX ADMIN — LEVOTHYROXINE SODIUM 112 MICROGRAM(S): 25 TABLET ORAL at 06:24

## 2025-02-08 RX ADMIN — Medication 40 MILLIGRAM(S): at 06:28

## 2025-02-08 RX ADMIN — IPRATROPIUM BROMIDE AND ALBUTEROL SULFATE 3 MILLILITER(S): .5; 2.5 SOLUTION RESPIRATORY (INHALATION) at 17:07

## 2025-02-08 RX ADMIN — APIXABAN 2.5 MILLIGRAM(S): 5 TABLET, FILM COATED ORAL at 06:26

## 2025-02-08 RX ADMIN — CEFTRIAXONE 100 MILLIGRAM(S): 250 INJECTION, POWDER, FOR SOLUTION INTRAMUSCULAR; INTRAVENOUS at 00:42

## 2025-02-08 NOTE — PROGRESS NOTE ADULT - SUBJECTIVE AND OBJECTIVE BOX
DATE OF SERVICE: 02-08-25 @ 19:31    Patient is a 89y old  Female who presents with a chief complaint of CP (07 Feb 2025 16:52)      INTERVAL HISTORY: Feels ok    REVIEW OF SYSTEMS:  CONSTITUTIONAL: No weakness  EYES/ENT: No visual changes;  No throat pain   NECK: No pain or stiffness  RESPIRATORY: No cough, wheezing; No shortness of breath  CARDIOVASCULAR: No chest pain or palpitations  GASTROINTESTINAL: No abdominal  pain. No nausea, vomiting, or hematemesis  GENITOURINARY: No dysuria, frequency or hematuria  NEUROLOGICAL: No stroke like symptoms  SKIN: No rashes    TELEMETRY Personally reviewed: NSR 60s  	  MEDICATIONS:  aMIOdarone    Tablet 100 milliGRAM(s) Oral daily  furosemide   Injectable 40 milliGRAM(s) IV Push daily  metoprolol succinate ER 12.5 milliGRAM(s) Oral daily  spironolactone 12.5 milliGRAM(s) Oral daily        PHYSICAL EXAM:  T(C): 36.5 (02-08-25 @ 11:15), Max: 36.6 (02-07-25 @ 21:59)  HR: 67 (02-08-25 @ 11:15) (67 - 87)  BP: 99/62 (02-08-25 @ 11:15) (99/62 - 104/68)  RR: 16 (02-08-25 @ 11:15) (16 - 18)  SpO2: 95% (02-08-25 @ 11:15) (93% - 95%)  Wt(kg): --  I&O's Summary    07 Feb 2025 07:01  -  08 Feb 2025 07:00  --------------------------------------------------------  IN: 480 mL / OUT: 100 mL / NET: 380 mL    08 Feb 2025 07:01  -  08 Feb 2025 19:31  --------------------------------------------------------  IN: 540 mL / OUT: 0 mL / NET: 540 mL          Appearance: In no distress	  HEENT:    PERRL, EOMI	  Cardiovascular:  S1 S2, No JVD  Respiratory: Lungs clear to auscultation	  Gastrointestinal:  Soft, Non-tender, + BS	  Vascularature:  No edema of LE  Psychiatric: Appropriate affect   Neuro: no acute focal deficits                               11.8   3.60  )-----------( 170      ( 08 Feb 2025 07:29 )             38.1     02-08    139  |  103  |  20  ----------------------------<  94  3.6   |  22  |  1.09    Ca    9.0      08 Feb 2025 07:29  Mg     2.5     02-08          Labs personally reviewed      ASSESSMENT/PLAN: 	    89F pmh HFrEF(35%), Afib on eliquis, CRT-D, HTN, hypothyroid coming in after recent bout of URI/pneumonia s/p course oral antibiotics.  Symptoms resumed few days ago and today she developed lower chest discomfort about 2 cm above her lower border of the sternum. Now unable to tolerate PO, everything she eats even liquids hurts her substantially.     Problem/Plan #1: Chest Pain  - Will review ECG although given she has CRT may not be diagnostic for ischemia  - Trop 45-->47 similar to prior admission    Problem/Plan #2: Acute Systolic Heart Failure  - s/p CRT  - Prior TTE shows EF 45-50%, + WMA; Will repeat   - CT shows B/L GGOs, septal thickening, B/L patchy consolidation, thickening of distal esophagus  - TTE 2/6 - EF 41%, WMA (stable compared to 2023), severe grade 3 DD, significant elevated filling pressure, mod-severe MR  - Resume Lasix 40mg IV daily  - c/w sHF GDMT:  --- Toprol 12.5mg PO daily  --- Spironolactone 12.5mg PO daily  --- Recommend resuming Jardiance (home dose)       Problem/Plan #3: Atrial Fibrillation  - c/w amiodarone 100mg PO daily  - c/w Toprol 12.5mg PO daily  - c/w Eliquis 2.5mg PO BID    Problem/Plan #4: Influenza A  - c/w tamiflu  - c/w Abx as per primary team  - c/w supplemental oxygen and wean as tolerated        JESSICA Martins DO [facility]  Cardiovascular Medicine  [address] [place] [address], [address]  Available through call or text on [employer] TE[employer]  Office: [phone]

## 2025-02-08 NOTE — PROGRESS NOTE ADULT - SUBJECTIVE AND OBJECTIVE BOX
Patient is a 89y old  Female who presents with a chief complaint of CP (08 Feb 2025 19:31)      INTERVAL HPI/OVERNIGHT EVENTS: seen and examined , NAD  T(C): 36.6 (02-08-25 @ 20:40), Max: 36.6 (02-08-25 @ 05:10)  HR: 93 (02-08-25 @ 20:40) (67 - 93)  BP: 99/65 (02-08-25 @ 20:40) (99/62 - 102/62)  RR: 17 (02-08-25 @ 20:40) (16 - 18)  SpO2: 95% (02-08-25 @ 20:40) (95% - 95%)  Wt(kg): --  I&O's Summary    07 Feb 2025 07:01  -  08 Feb 2025 07:00  --------------------------------------------------------  IN: 480 mL / OUT: 100 mL / NET: 380 mL    08 Feb 2025 07:01  -  08 Feb 2025 22:59  --------------------------------------------------------  IN: 540 mL / OUT: 0 mL / NET: 540 mL        PAST MEDICAL & SURGICAL HISTORY:  H/O: hypothyroidism      Hypercholesteremia      Thyroid ca      Lupus      Meniere disease      Anemia      Bundle branch block, left      Bleeding hemorrhoid      Left ovarian cyst      S/P thyroidectomy  1981      History of appendectomy  1939      History of abdominal hysterectomy  1970      S/P breast biopsy  Right breast benign cyst removed.      Cataract cortical, senile  s/p extraction right eye 12/13          SOCIAL HISTORY  Alcohol:  Tobacco:  Illicit substance use:    FAMILY HISTORY:    REVIEW OF SYSTEMS:  CONSTITUTIONAL: No fever, weight loss, or fatigue  EYES: No eye pain, visual disturbances, or discharge  ENMT:  No difficulty hearing, tinnitus, vertigo; No sinus or throat pain  NECK: No pain or stiffness  RESPIRATORY: No cough, wheezing, chills or hemoptysis; No shortness of breath  CARDIOVASCULAR: No chest pain, palpitations, dizziness, or leg swelling  GASTROINTESTINAL: No abdominal or epigastric pain. No nausea, vomiting, or hematemesis; No diarrhea or constipation. No melena or hematochezia.  GENITOURINARY: No dysuria, frequency, hematuria, or incontinence  NEUROLOGICAL: No headaches, memory loss, loss of strength, numbness, or tremors  SKIN: No itching, burning, rashes, or lesions   LYMPH NODES: No enlarged glands  ENDOCRINE: No heat or cold intolerance; No hair loss  MUSCULOSKELETAL: No joint pain or swelling; No muscle, back, or extremity pain  PSYCHIATRIC: No depression, anxiety, mood swings, or difficulty sleeping  HEME/LYMPH: No easy bruising, or bleeding gums  ALLERY AND IMMUNOLOGIC: No hives or eczema    RADIOLOGY & ADDITIONAL TESTS:    Imaging Personally Reviewed:  [ ] YES  [ ] NO    Consultant(s) Notes Reviewed:  [ ] YES  [ ] NO    PHYSICAL EXAM:  GENERAL: NAD, well-groomed, well-developed  HEAD:  Atraumatic, Normocephalic  EYES: EOMI, PERRLA, conjunctiva and sclera clear  ENMT: No tonsillar erythema, exudates, or enlargement; Moist mucous membranes, Good dentition, No lesions  NECK: Supple, No JVD, Normal thyroid  NERVOUS SYSTEM:  Alert & Oriented X3, Good concentration; Motor Strength 5/5 B/L upper and lower extremities; DTRs 2+ intact and symmetric  CHEST/LUNG: Clear to percussion bilaterally; No rales, rhonchi, wheezing, or rubs  HEART: Regular rate and rhythm; No murmurs, rubs, or gallops  ABDOMEN: Soft, Nontender, Nondistended; Bowel sounds present  EXTREMITIES:  2+ Peripheral Pulses, No clubbing, cyanosis, or edema  LYMPH: No lymphadenopathy noted  SKIN: No rashes or lesions    LABS:                        11.8   3.60  )-----------( 170      ( 08 Feb 2025 07:29 )             38.1     02-08    139  |  103  |  20  ----------------------------<  94  3.6   |  22  |  1.09    Ca    9.0      08 Feb 2025 07:29  Mg     2.5     02-08        Urinalysis Basic - ( 08 Feb 2025 07:29 )    Color: x / Appearance: x / SG: x / pH: x  Gluc: 94 mg/dL / Ketone: x  / Bili: x / Urobili: x   Blood: x / Protein: x / Nitrite: x   Leuk Esterase: x / RBC: x / WBC x   Sq Epi: x / Non Sq Epi: x / Bacteria: x      CAPILLARY BLOOD GLUCOSE            Urinalysis Basic - ( 08 Feb 2025 07:29 )    Color: x / Appearance: x / SG: x / pH: x  Gluc: 94 mg/dL / Ketone: x  / Bili: x / Urobili: x   Blood: x / Protein: x / Nitrite: x   Leuk Esterase: x / RBC: x / WBC x   Sq Epi: x / Non Sq Epi: x / Bacteria: x        MEDICATIONS  (STANDING):  albuterol/ipratropium for Nebulization 3 milliLiter(s) Nebulizer every 6 hours  aluminum hydroxide/magnesium hydroxide/simethicone Suspension 30 milliLiter(s) Oral once  aMIOdarone    Tablet 100 milliGRAM(s) Oral daily  apixaban 2.5 milliGRAM(s) Oral every 12 hours  cefTRIAXone   IVPB 1000 milliGRAM(s) IV Intermittent every 24 hours  furosemide   Injectable 40 milliGRAM(s) IV Push daily  guaiFENesin Oral Liquid (Sugar-Free) 100 milliGRAM(s) Oral every 6 hours  levothyroxine 112 MICROGram(s) Oral daily  metoprolol succinate ER 12.5 milliGRAM(s) Oral daily  oseltamivir 30 milliGRAM(s) Oral daily  pantoprazole    Tablet 40 milliGRAM(s) Oral before breakfast  sodium chloride 0.9%. 1000 milliLiter(s) (50 mL/Hr) IV Continuous <Continuous>  spironolactone 12.5 milliGRAM(s) Oral daily  sucralfate 1 Gram(s) Oral two times a day    MEDICATIONS  (PRN):  acetaminophen     Tablet .. 650 milliGRAM(s) Oral every 6 hours PRN Temp greater or equal to 38C (100.4F), Mild Pain (1 - 3)  melatonin 3 milliGRAM(s) Oral at bedtime PRN Insomnia  ondansetron Injectable 4 milliGRAM(s) IV Push every 8 hours PRN Nausea and/or Vomiting      Care Discussed with Consultants/Other Providers [ ] YES  [ ] NO

## 2025-02-09 LAB
ANION GAP SERPL CALC-SCNC: 15 MMOL/L — SIGNIFICANT CHANGE UP (ref 5–17)
ANION GAP SERPL CALC-SCNC: 20 MMOL/L — HIGH (ref 5–17)
BUN SERPL-MCNC: 24 MG/DL — HIGH (ref 7–23)
BUN SERPL-MCNC: 25 MG/DL — HIGH (ref 7–23)
CALCIUM SERPL-MCNC: 9.2 MG/DL — SIGNIFICANT CHANGE UP (ref 8.4–10.5)
CALCIUM SERPL-MCNC: <3 MG/DL — CRITICAL LOW (ref 8.4–10.5)
CHLORIDE SERPL-SCNC: 101 MMOL/L — SIGNIFICANT CHANGE UP (ref 96–108)
CHLORIDE SERPL-SCNC: 102 MMOL/L — SIGNIFICANT CHANGE UP (ref 96–108)
CO2 SERPL-SCNC: 18 MMOL/L — LOW (ref 22–31)
CO2 SERPL-SCNC: 25 MMOL/L — SIGNIFICANT CHANGE UP (ref 22–31)
CREAT SERPL-MCNC: 1.08 MG/DL — SIGNIFICANT CHANGE UP (ref 0.5–1.3)
CREAT SERPL-MCNC: 1.08 MG/DL — SIGNIFICANT CHANGE UP (ref 0.5–1.3)
EGFR: 49 ML/MIN/1.73M2 — LOW
EGFR: 49 ML/MIN/1.73M2 — LOW
GLUCOSE SERPL-MCNC: 176 MG/DL — HIGH (ref 70–99)
GLUCOSE SERPL-MCNC: 95 MG/DL — SIGNIFICANT CHANGE UP (ref 70–99)
HCT VFR BLD CALC: 37 % — SIGNIFICANT CHANGE UP (ref 34.5–45)
HGB BLD-MCNC: 11.7 G/DL — SIGNIFICANT CHANGE UP (ref 11.5–15.5)
MAGNESIUM SERPL-MCNC: <.6 MG/DL — CRITICAL LOW (ref 1.6–2.6)
MCHC RBC-ENTMCNC: 27.9 PG — SIGNIFICANT CHANGE UP (ref 27–34)
MCHC RBC-ENTMCNC: 31.6 G/DL — LOW (ref 32–36)
MCV RBC AUTO: 88.3 FL — SIGNIFICANT CHANGE UP (ref 80–100)
NRBC # BLD: 0 /100 WBCS — SIGNIFICANT CHANGE UP (ref 0–0)
NRBC BLD-RTO: 0 /100 WBCS — SIGNIFICANT CHANGE UP (ref 0–0)
PLATELET # BLD AUTO: 185 K/UL — SIGNIFICANT CHANGE UP (ref 150–400)
POTASSIUM SERPL-MCNC: 3.4 MMOL/L — LOW (ref 3.5–5.3)
POTASSIUM SERPL-MCNC: >9 MMOL/L — CRITICAL HIGH (ref 3.5–5.3)
POTASSIUM SERPL-SCNC: 3.4 MMOL/L — LOW (ref 3.5–5.3)
POTASSIUM SERPL-SCNC: >9 MMOL/L — CRITICAL HIGH (ref 3.5–5.3)
RBC # BLD: 4.19 M/UL — SIGNIFICANT CHANGE UP (ref 3.8–5.2)
RBC # FLD: 15.4 % — HIGH (ref 10.3–14.5)
SODIUM SERPL-SCNC: 140 MMOL/L — SIGNIFICANT CHANGE UP (ref 135–145)
SODIUM SERPL-SCNC: 141 MMOL/L — SIGNIFICANT CHANGE UP (ref 135–145)
WBC # BLD: 2.64 K/UL — LOW (ref 3.8–10.5)
WBC # FLD AUTO: 2.64 K/UL — LOW (ref 3.8–10.5)

## 2025-02-09 RX ORDER — POTASSIUM CHLORIDE 750 MG/1
40 TABLET, EXTENDED RELEASE ORAL ONCE
Refills: 0 | Status: COMPLETED | OUTPATIENT
Start: 2025-02-09 | End: 2025-02-09

## 2025-02-09 RX ADMIN — Medication 100 MILLIGRAM(S): at 23:07

## 2025-02-09 RX ADMIN — Medication 100 MILLIGRAM(S): at 17:26

## 2025-02-09 RX ADMIN — CEFTRIAXONE 100 MILLIGRAM(S): 250 INJECTION, POWDER, FOR SOLUTION INTRAMUSCULAR; INTRAVENOUS at 00:17

## 2025-02-09 RX ADMIN — IPRATROPIUM BROMIDE AND ALBUTEROL SULFATE 3 MILLILITER(S): .5; 2.5 SOLUTION RESPIRATORY (INHALATION) at 06:22

## 2025-02-09 RX ADMIN — ACETAMINOPHEN 650 MILLIGRAM(S): 160 SUSPENSION ORAL at 00:57

## 2025-02-09 RX ADMIN — Medication 40 MILLIGRAM(S): at 06:26

## 2025-02-09 RX ADMIN — Medication 12.5 MILLIGRAM(S): at 06:23

## 2025-02-09 RX ADMIN — IPRATROPIUM BROMIDE AND ALBUTEROL SULFATE 3 MILLILITER(S): .5; 2.5 SOLUTION RESPIRATORY (INHALATION) at 11:08

## 2025-02-09 RX ADMIN — POTASSIUM CHLORIDE 40 MILLIEQUIVALENT(S): 750 TABLET, EXTENDED RELEASE ORAL at 17:25

## 2025-02-09 RX ADMIN — Medication 100 MILLIGRAM(S): at 11:08

## 2025-02-09 RX ADMIN — IPRATROPIUM BROMIDE AND ALBUTEROL SULFATE 3 MILLILITER(S): .5; 2.5 SOLUTION RESPIRATORY (INHALATION) at 23:08

## 2025-02-09 RX ADMIN — OSELTAMIVIR PHOSPHATE 30 MILLIGRAM(S): 75 CAPSULE ORAL at 11:08

## 2025-02-09 RX ADMIN — APIXABAN 2.5 MILLIGRAM(S): 5 TABLET, FILM COATED ORAL at 06:25

## 2025-02-09 RX ADMIN — PANTOPRAZOLE 40 MILLIGRAM(S): 20 TABLET, DELAYED RELEASE ORAL at 06:25

## 2025-02-09 RX ADMIN — SUCRALFATE 1 GRAM(S): 1 SUSPENSION ORAL at 17:25

## 2025-02-09 RX ADMIN — APIXABAN 2.5 MILLIGRAM(S): 5 TABLET, FILM COATED ORAL at 17:26

## 2025-02-09 RX ADMIN — CEFTRIAXONE 100 MILLIGRAM(S): 250 INJECTION, POWDER, FOR SOLUTION INTRAMUSCULAR; INTRAVENOUS at 23:07

## 2025-02-09 RX ADMIN — AMIODARONE HYDROCHLORIDE 100 MILLIGRAM(S): 50 INJECTION, SOLUTION INTRAVENOUS at 06:25

## 2025-02-09 RX ADMIN — Medication 12.5 MILLIGRAM(S): at 06:24

## 2025-02-09 RX ADMIN — Medication 100 MILLIGRAM(S): at 06:22

## 2025-02-09 RX ADMIN — Medication 100 MILLIGRAM(S): at 00:19

## 2025-02-09 RX ADMIN — SUCRALFATE 1 GRAM(S): 1 SUSPENSION ORAL at 06:24

## 2025-02-09 RX ADMIN — ACETAMINOPHEN 650 MILLIGRAM(S): 160 SUSPENSION ORAL at 00:27

## 2025-02-09 RX ADMIN — LEVOTHYROXINE SODIUM 112 MICROGRAM(S): 25 TABLET ORAL at 06:24

## 2025-02-09 RX ADMIN — IPRATROPIUM BROMIDE AND ALBUTEROL SULFATE 3 MILLILITER(S): .5; 2.5 SOLUTION RESPIRATORY (INHALATION) at 00:17

## 2025-02-09 RX ADMIN — DAPAGLIFLOZIN 10 MILLIGRAM(S): 5 TABLET, FILM COATED ORAL at 11:08

## 2025-02-09 RX ADMIN — IPRATROPIUM BROMIDE AND ALBUTEROL SULFATE 3 MILLILITER(S): .5; 2.5 SOLUTION RESPIRATORY (INHALATION) at 17:25

## 2025-02-09 NOTE — PROGRESS NOTE ADULT - SUBJECTIVE AND OBJECTIVE BOX
DATE OF SERVICE: 02-09-25 @ 15:09    Patient is a 89y old  Female who presents with a chief complaint of CP (08 Feb 2025 19:31)    INTERVAL HISTORY: Feels ok    REVIEW OF SYSTEMS:  CONSTITUTIONAL: No weakness  EYES/ENT: No visual changes;  No throat pain   NECK: No pain or stiffness  RESPIRATORY: No cough, wheezing; No shortness of breath  CARDIOVASCULAR: No chest pain or palpitations  GASTROINTESTINAL: No abdominal  pain. No nausea, vomiting, or hematemesis  GENITOURINARY: No dysuria, frequency or hematuria  NEUROLOGICAL: No stroke like symptoms  SKIN: No rashes    TELEMETRY Personally reviewed: V paced 70s  	  MEDICATIONS:  aMIOdarone    Tablet 100 milliGRAM(s) Oral daily  furosemide   Injectable 40 milliGRAM(s) IV Push daily  metoprolol succinate ER 12.5 milliGRAM(s) Oral daily  spironolactone 12.5 milliGRAM(s) Oral daily      PHYSICAL EXAM:  T(C): 36.6 (02-09-25 @ 12:20), Max: 36.7 (02-09-25 @ 05:11)  HR: 70 (02-09-25 @ 12:20) (67 - 93)  BP: 95/58 (02-09-25 @ 12:20) (95/58 - 118/71)  RR: 16 (02-09-25 @ 12:20) (16 - 17)  SpO2: 96% (02-09-25 @ 12:20) (95% - 98%)  Wt(kg): --  I&O's Summary    08 Feb 2025 07:01  -  09 Feb 2025 07:00  --------------------------------------------------------  IN: 540 mL / OUT: 0 mL / NET: 540 mL    09 Feb 2025 07:01  -  09 Feb 2025 15:09  --------------------------------------------------------  IN: 180 mL / OUT: 650 mL / NET: -470 mL          Appearance: In no distress	  HEENT:    PERRL, EOMI	  Cardiovascular:  S1 S2, No JVD  Respiratory: Lungs clear to auscultation	  Gastrointestinal:  Soft, Non-tender, + BS	  Vasculature:  No edema of LE  Psychiatric: Appropriate affect   Neuro: no acute focal deficits                               11.7   2.64  )-----------( 185      ( 09 Feb 2025 07:09 )             37.0     02-09    141  |  101  |  25[H]  ----------------------------<  176[H]  3.4[L]   |  25  |  1.08    Ca    9.2      09 Feb 2025 09:28  Mg     <.6     02-09      Labs personally reviewed      ASSESSMENT/PLAN: 	    89F pmh HFrEF(35%), Afib on eliquis, CRT-D, HTN, hypothyroid coming in after recent bout of URI/pneumonia s/p course oral antibiotics.  Symptoms resumed few days ago and today she developed lower chest discomfort about 2 cm above her lower border of the sternum. Now unable to tolerate PO, everything she eats even liquids hurts her substantially.     Problem/Plan #1: Chest Pain  - Will review ECG although given she has CRT may not be diagnostic for ischemia  - Trop 45-->47 similar to prior admission    Problem/Plan #2: Acute Systolic Heart Failure  - s/p CRT  - Prior TTE shows EF 45-50%, + WMA; Will repeat   - CT shows B/L GGOs, septal thickening, B/L patchy consolidation, thickening of distal esophagus  - TTE 2/6 - EF 41%, WMA (stable compared to 2023), severe grade 3 DD, significant elevated filling pressure, mod-severe MR  - Resume Lasix 40mg IV daily  - c/w sHF GDMT:  --- Toprol 12.5mg PO daily  --- Spironolactone 12.5mg PO daily  --- Recommend resuming Jardiance (home dose)       Problem/Plan #3: Atrial Fibrillation  - c/w amiodarone 100mg PO daily  - c/w Toprol 12.5mg PO daily  - c/w Eliquis 2.5mg PO BID    Problem/Plan #4: Influenza A  - c/w tamiflu  - c/w Abx as per primary team  - c/w supplemental oxygen and wean as tolerated      JESSICA Martins DO [facility]  Cardiovascular Medicine  [address] [place] [address], [facility], [place] [address]  Available through call or text on [employer] TE[employer]  Office: [phone]   DATE OF SERVICE: 02-09-25 @ 15:09    Patient is a 89y old  Female who presents with a chief complaint of CP (08 Feb 2025 19:31)    INTERVAL HISTORY: Feels ok    REVIEW OF SYSTEMS:  CONSTITUTIONAL: No weakness  EYES/ENT: No visual changes;  No throat pain   NECK: No pain or stiffness  RESPIRATORY: No cough, wheezing; No shortness of breath  CARDIOVASCULAR: No chest pain or palpitations  GASTROINTESTINAL: No abdominal  pain. No nausea, vomiting, or hematemesis  GENITOURINARY: No dysuria, frequency or hematuria  NEUROLOGICAL: No stroke like symptoms  SKIN: No rashes    TELEMETRY Personally reviewed: V paced 70s  	  MEDICATIONS:  aMIOdarone    Tablet 100 milliGRAM(s) Oral daily  furosemide   Injectable 40 milliGRAM(s) IV Push daily  metoprolol succinate ER 12.5 milliGRAM(s) Oral daily  spironolactone 12.5 milliGRAM(s) Oral daily      PHYSICAL EXAM:  T(C): 36.6 (02-09-25 @ 12:20), Max: 36.7 (02-09-25 @ 05:11)  HR: 70 (02-09-25 @ 12:20) (67 - 93)  BP: 95/58 (02-09-25 @ 12:20) (95/58 - 118/71)  RR: 16 (02-09-25 @ 12:20) (16 - 17)  SpO2: 96% (02-09-25 @ 12:20) (95% - 98%)  Wt(kg): --  I&O's Summary    08 Feb 2025 07:01  -  09 Feb 2025 07:00  --------------------------------------------------------  IN: 540 mL / OUT: 0 mL / NET: 540 mL    09 Feb 2025 07:01  -  09 Feb 2025 15:09  --------------------------------------------------------  IN: 180 mL / OUT: 650 mL / NET: -470 mL          Appearance: In no distress	  HEENT:    PERRL, EOMI	  Cardiovascular:  S1 S2, No JVD  Respiratory: Lungs clear to auscultation	  Gastrointestinal:  Soft, Non-tender, + BS	  Vasculature:  No edema of LE  Psychiatric: Appropriate affect   Neuro: no acute focal deficits                               11.7   2.64  )-----------( 185      ( 09 Feb 2025 07:09 )             37.0     02-09    141  |  101  |  25[H]  ----------------------------<  176[H]  3.4[L]   |  25  |  1.08    Ca    9.2      09 Feb 2025 09:28  Mg     <.6     02-09      Labs personally reviewed      ASSESSMENT/PLAN: 	    89F pmh HFrEF(35%), Afib on eliquis, CRT-D, HTN, hypothyroid coming in after recent bout of URI/pneumonia s/p course oral antibiotics.  Symptoms resumed few days ago and today she developed lower chest discomfort about 2 cm above her lower border of the sternum. Now unable to tolerate PO, everything she eats even liquids hurts her substantially.     Problem/Plan #1: Chest Pain  - Will review ECG although given she has CRT may not be diagnostic for ischemia  - Trop 45-->47 similar to prior admission    Problem/Plan #2: Acute Systolic Heart Failure  - s/p CRT  - CT shows B/L GGOs, septal thickening, B/L patchy consolidation, thickening of distal esophagus  - TTE 2/6 - EF 41%, WMA (stable compared to 2023), severe grade 3 DD, significant elevated filling pressure, mod-severe MR  - Resume Lasix 40mg IV daily  - c/w sHF GDMT:  --- Toprol 12.5mg PO daily  --- Spironolactone 12.5mg PO daily  --- Recommend resuming Jardiance (home dose)    --- Soft BP precludes further uptitration             Problem/Plan #3: Atrial Fibrillation  - c/w amiodarone 100mg PO daily  - c/w Toprol 12.5mg PO daily  - c/w Eliquis 2.5mg PO BID    Problem/Plan #4: Influenza A  - c/w tamiflu  - c/w Abx as per primary team  - c/w supplemental oxygen and wean as tolerated      JESSICA Martins DO [facility]  Cardiovascular Medicine  [address] [place] [address], [address]  Available through call or text on [employer] TE[employer]  Office: [phone]

## 2025-02-09 NOTE — PROGRESS NOTE ADULT - SUBJECTIVE AND OBJECTIVE BOX
Patient is a 89y old  Female who presents with a chief complaint of CP (09 Feb 2025 15:09)      INTERVAL HPI/OVERNIGHT EVENTS: seen and examined   T(C): 36.9 (02-09-25 @ 20:40), Max: 36.9 (02-09-25 @ 20:40)  HR: 70 (02-09-25 @ 20:40) (67 - 70)  BP: 108/67 (02-09-25 @ 20:40) (95/58 - 118/71)  RR: 18 (02-09-25 @ 20:40) (16 - 18)  SpO2: 96% (02-09-25 @ 20:40) (96% - 98%)  Wt(kg): --  I&O's Summary    08 Feb 2025 07:01  -  09 Feb 2025 07:00  --------------------------------------------------------  IN: 540 mL / OUT: 0 mL / NET: 540 mL    09 Feb 2025 07:01  -  09 Feb 2025 21:10  --------------------------------------------------------  IN: 540 mL / OUT: 650 mL / NET: -110 mL        PAST MEDICAL & SURGICAL HISTORY:  H/O: hypothyroidism      Hypercholesteremia      Thyroid ca      Lupus      Meniere disease      Anemia      Bundle branch block, left      Bleeding hemorrhoid      Left ovarian cyst      S/P thyroidectomy  1981      History of appendectomy  1939      History of abdominal hysterectomy  1970      S/P breast biopsy  Right breast benign cyst removed.      Cataract cortical, senile  s/p extraction right eye 12/13          SOCIAL HISTORY  Alcohol:  Tobacco:  Illicit substance use:    FAMILY HISTORY:    REVIEW OF SYSTEMS:  CONSTITUTIONAL: No fever, weight loss, or fatigue  EYES: No eye pain, visual disturbances, or discharge  ENMT:  No difficulty hearing, tinnitus, vertigo; No sinus or throat pain  NECK: No pain or stiffness  RESPIRATORY: No cough, wheezing, chills or hemoptysis; No shortness of breath  CARDIOVASCULAR: No chest pain, palpitations, dizziness, or leg swelling  GASTROINTESTINAL: No abdominal or epigastric pain. No nausea, vomiting, or hematemesis; No diarrhea or constipation. No melena or hematochezia.  GENITOURINARY: No dysuria, frequency, hematuria, or incontinence  NEUROLOGICAL: No headaches, memory loss, loss of strength, numbness, or tremors  SKIN: No itching, burning, rashes, or lesions   LYMPH NODES: No enlarged glands  ENDOCRINE: No heat or cold intolerance; No hair loss  MUSCULOSKELETAL: No joint pain or swelling; No muscle, back, or extremity pain  PSYCHIATRIC: No depression, anxiety, mood swings, or difficulty sleeping  HEME/LYMPH: No easy bruising, or bleeding gums  ALLERY AND IMMUNOLOGIC: No hives or eczema    RADIOLOGY & ADDITIONAL TESTS:    Imaging Personally Reviewed:  [ ] YES  [ ] NO    Consultant(s) Notes Reviewed:  [ ] YES  [ ] NO    PHYSICAL EXAM:  GENERAL: NAD, well-groomed, well-developed  HEAD:  Atraumatic, Normocephalic  EYES: EOMI, PERRLA, conjunctiva and sclera clear  ENMT: No tonsillar erythema, exudates, or enlargement; Moist mucous membranes, Good dentition, No lesions  NECK: Supple, No JVD, Normal thyroid  NERVOUS SYSTEM:  Alert & Oriented X3, Good concentration; Motor Strength 5/5 B/L upper and lower extremities; DTRs 2+ intact and symmetric  CHEST/LUNG: Clear to percussion bilaterally; No rales, rhonchi, wheezing, or rubs  HEART: Regular rate and rhythm; No murmurs, rubs, or gallops  ABDOMEN: Soft, Nontender, Nondistended; Bowel sounds present  EXTREMITIES:  2+ Peripheral Pulses, No clubbing, cyanosis, or edema  LYMPH: No lymphadenopathy noted  SKIN: No rashes or lesions    LABS:                        11.7   2.64  )-----------( 185      ( 09 Feb 2025 07:09 )             37.0     02-09    141  |  101  |  25[H]  ----------------------------<  176[H]  3.4[L]   |  25  |  1.08    Ca    9.2      09 Feb 2025 09:28  Mg     <.6     02-09        Urinalysis Basic - ( 09 Feb 2025 09:28 )    Color: x / Appearance: x / SG: x / pH: x  Gluc: 176 mg/dL / Ketone: x  / Bili: x / Urobili: x   Blood: x / Protein: x / Nitrite: x   Leuk Esterase: x / RBC: x / WBC x   Sq Epi: x / Non Sq Epi: x / Bacteria: x      CAPILLARY BLOOD GLUCOSE            Urinalysis Basic - ( 09 Feb 2025 09:28 )    Color: x / Appearance: x / SG: x / pH: x  Gluc: 176 mg/dL / Ketone: x  / Bili: x / Urobili: x   Blood: x / Protein: x / Nitrite: x   Leuk Esterase: x / RBC: x / WBC x   Sq Epi: x / Non Sq Epi: x / Bacteria: x        MEDICATIONS  (STANDING):  albuterol/ipratropium for Nebulization 3 milliLiter(s) Nebulizer every 6 hours  aluminum hydroxide/magnesium hydroxide/simethicone Suspension 30 milliLiter(s) Oral once  aMIOdarone    Tablet 100 milliGRAM(s) Oral daily  apixaban 2.5 milliGRAM(s) Oral every 12 hours  cefTRIAXone   IVPB 1000 milliGRAM(s) IV Intermittent every 24 hours  dapagliflozin 10 milliGRAM(s) Oral daily  furosemide   Injectable 40 milliGRAM(s) IV Push daily  guaiFENesin Oral Liquid (Sugar-Free) 100 milliGRAM(s) Oral every 6 hours  levothyroxine 112 MICROGram(s) Oral daily  metoprolol succinate ER 12.5 milliGRAM(s) Oral daily  oseltamivir 30 milliGRAM(s) Oral daily  pantoprazole    Tablet 40 milliGRAM(s) Oral before breakfast  sodium chloride 0.9%. 1000 milliLiter(s) (50 mL/Hr) IV Continuous <Continuous>  spironolactone 12.5 milliGRAM(s) Oral daily  sucralfate 1 Gram(s) Oral two times a day    MEDICATIONS  (PRN):  acetaminophen     Tablet .. 650 milliGRAM(s) Oral every 6 hours PRN Temp greater or equal to 38C (100.4F), Mild Pain (1 - 3)  melatonin 3 milliGRAM(s) Oral at bedtime PRN Insomnia  ondansetron Injectable 4 milliGRAM(s) IV Push every 8 hours PRN Nausea and/or Vomiting      Care Discussed with Consultants/Other Providers [ ] YES  [ ] NO

## 2025-02-10 LAB
ANION GAP SERPL CALC-SCNC: 15 MMOL/L — SIGNIFICANT CHANGE UP (ref 5–17)
BUN SERPL-MCNC: 28 MG/DL — HIGH (ref 7–23)
CALCIUM SERPL-MCNC: 9.3 MG/DL — SIGNIFICANT CHANGE UP (ref 8.4–10.5)
CHLORIDE SERPL-SCNC: 101 MMOL/L — SIGNIFICANT CHANGE UP (ref 96–108)
CO2 SERPL-SCNC: 22 MMOL/L — SIGNIFICANT CHANGE UP (ref 22–31)
CREAT SERPL-MCNC: 1.07 MG/DL — SIGNIFICANT CHANGE UP (ref 0.5–1.3)
EGFR: 50 ML/MIN/1.73M2 — LOW
GLUCOSE SERPL-MCNC: 102 MG/DL — HIGH (ref 70–99)
MAGNESIUM SERPL-MCNC: 2.4 MG/DL — SIGNIFICANT CHANGE UP (ref 1.6–2.6)
POTASSIUM SERPL-MCNC: 4.8 MMOL/L — SIGNIFICANT CHANGE UP (ref 3.5–5.3)
POTASSIUM SERPL-SCNC: 4.8 MMOL/L — SIGNIFICANT CHANGE UP (ref 3.5–5.3)
SODIUM SERPL-SCNC: 138 MMOL/L — SIGNIFICANT CHANGE UP (ref 135–145)

## 2025-02-10 RX ADMIN — APIXABAN 2.5 MILLIGRAM(S): 5 TABLET, FILM COATED ORAL at 17:56

## 2025-02-10 RX ADMIN — OSELTAMIVIR PHOSPHATE 30 MILLIGRAM(S): 75 CAPSULE ORAL at 11:37

## 2025-02-10 RX ADMIN — LEVOTHYROXINE SODIUM 112 MICROGRAM(S): 25 TABLET ORAL at 05:40

## 2025-02-10 RX ADMIN — SUCRALFATE 1 GRAM(S): 1 SUSPENSION ORAL at 05:41

## 2025-02-10 RX ADMIN — DAPAGLIFLOZIN 10 MILLIGRAM(S): 5 TABLET, FILM COATED ORAL at 11:37

## 2025-02-10 RX ADMIN — Medication 100 MILLIGRAM(S): at 17:56

## 2025-02-10 RX ADMIN — AMIODARONE HYDROCHLORIDE 100 MILLIGRAM(S): 50 INJECTION, SOLUTION INTRAVENOUS at 05:40

## 2025-02-10 RX ADMIN — IPRATROPIUM BROMIDE AND ALBUTEROL SULFATE 3 MILLILITER(S): .5; 2.5 SOLUTION RESPIRATORY (INHALATION) at 05:41

## 2025-02-10 RX ADMIN — ACETAMINOPHEN 650 MILLIGRAM(S): 160 SUSPENSION ORAL at 16:22

## 2025-02-10 RX ADMIN — IPRATROPIUM BROMIDE AND ALBUTEROL SULFATE 3 MILLILITER(S): .5; 2.5 SOLUTION RESPIRATORY (INHALATION) at 11:37

## 2025-02-10 RX ADMIN — PANTOPRAZOLE 40 MILLIGRAM(S): 20 TABLET, DELAYED RELEASE ORAL at 05:40

## 2025-02-10 RX ADMIN — IPRATROPIUM BROMIDE AND ALBUTEROL SULFATE 3 MILLILITER(S): .5; 2.5 SOLUTION RESPIRATORY (INHALATION) at 17:57

## 2025-02-10 RX ADMIN — Medication 100 MILLIGRAM(S): at 05:41

## 2025-02-10 RX ADMIN — APIXABAN 2.5 MILLIGRAM(S): 5 TABLET, FILM COATED ORAL at 05:40

## 2025-02-10 RX ADMIN — Medication 12.5 MILLIGRAM(S): at 05:41

## 2025-02-10 RX ADMIN — ACETAMINOPHEN 650 MILLIGRAM(S): 160 SUSPENSION ORAL at 17:54

## 2025-02-10 RX ADMIN — Medication 40 MILLIGRAM(S): at 05:41

## 2025-02-10 RX ADMIN — SUCRALFATE 1 GRAM(S): 1 SUSPENSION ORAL at 17:56

## 2025-02-10 RX ADMIN — Medication 12.5 MILLIGRAM(S): at 05:40

## 2025-02-10 NOTE — PROGRESS NOTE ADULT - SUBJECTIVE AND OBJECTIVE BOX
Name of Patient : ANISA LUCIANO  MRN: [id number]  Date of visit: 02-10-25 @ 15:47      Subjective: Patient seen and examined. No new events except as noted.   Patient seen earlier this AM. Lying down in bed. Continues on IV Lasix.   Denies chest pain, palpitations, shortness of breath or dyspnea.     REVIEW OF SYSTEMS:    CONSTITUTIONAL: Generalized weakness, No fevers or chills  EYES/ENT: No visual changes;  No vertigo or throat pain   NECK: No pain or stiffness  RESPIRATORY: No cough, wheezing, hemoptysis; No shortness of breath  CARDIOVASCULAR: No chest pain or palpitations  GASTROINTESTINAL: No abdominal or epigastric pain. No nausea, vomiting, or hematemesis; No diarrhea or constipation. No melena or hematochezia.  GENITOURINARY: No dysuria, frequency or hematuria  NEUROLOGICAL: No numbness or weakness  SKIN: No itching, burning, rashes, or lesions   All other review of systems is negative unless indicated above.    MEDICATIONS:  MEDICATIONS  (STANDING):  albuterol/ipratropium for Nebulization 3 milliLiter(s) Nebulizer every 6 hours  aluminum hydroxide/magnesium hydroxide/simethicone Suspension 30 milliLiter(s) Oral once  aMIOdarone    Tablet 100 milliGRAM(s) Oral daily  apixaban 2.5 milliGRAM(s) Oral every 12 hours  dapagliflozin 10 milliGRAM(s) Oral daily  furosemide   Injectable 40 milliGRAM(s) IV Push daily  guaiFENesin Oral Liquid (Sugar-Free) 100 milliGRAM(s) Oral every 6 hours  levothyroxine 112 MICROGram(s) Oral daily  metoprolol succinate ER 12.5 milliGRAM(s) Oral daily  pantoprazole    Tablet 40 milliGRAM(s) Oral before breakfast  sodium chloride 0.9%. 1000 milliLiter(s) (50 mL/Hr) IV Continuous <Continuous>  spironolactone 12.5 milliGRAM(s) Oral daily  sucralfate 1 Gram(s) Oral two times a day      PHYSICAL EXAM:  T(C): 37 (02-10-25 @ 10:58), Max: 37 (02-10-25 @ 10:58)  HR: 76 (02-10-25 @ 10:58) (66 - 83)  BP: 98/63 (02-10-25 @ 10:58) (98/62 - 108/67)  RR: 18 (02-10-25 @ 10:58) (18 - 18)  SpO2: 97% (02-10-25 @ 10:58) (93% - 97%)  Wt(kg): --  I&O's Summary    09 Feb 2025 07:01  -  10 Feb 2025 07:00  --------------------------------------------------------  IN: 744 mL / OUT: 650 mL / NET: 94 mL    10 Feb 2025 07:01  -  10 Feb 2025 15:47  --------------------------------------------------------  IN: 480 mL / OUT: 0 mL / NET: 480 mL          Appearance: Awake, generalized weakness 	  HEENT:  Eyes are open   Lymphatic: No lymphadenopathy grossly   Cardiovascular: Normal   Respiratory: normal effort on RA, clear  Gastrointestinal:  Soft, Non-tender  Skin: No rashes,  warm to touch  Psychiatry:  Mood & affect appropriate  Musculoskeletal: No edema           02-09-25 @ 07:01  -  02-10-25 @ 07:00  --------------------------------------------------------  IN: 744 mL / OUT: 650 mL / NET: 94 mL    02-10-25 @ 07:01  -  02-10-25 @ 15:47  --------------------------------------------------------  IN: 480 mL / OUT: 0 mL / NET: 480 mL                            11.7   2.64  )-----------( 185      ( 09 Feb 2025 07:09 )             37.0               02-10    138  |  101  |  28[H]  ----------------------------<  102[H]  4.8   |  22  |  1.07    Ca    9.3      10 Feb 2025 06:59  Mg     2.4     02-10                         Urinalysis Basic - ( 10 Feb 2025 06:59 )    Color: x / Appearance: x / SG: x / pH: x  Gluc: 102 mg/dL / Ketone: x  / Bili: x / Urobili: x   Blood: x / Protein: x / Nitrite: x   Leuk Esterase: x / RBC: x / WBC x   Sq Epi: x / Non Sq Epi: x / Bacteria: x      Culture - Blood (02.05.25 @ 20:00)   Specimen Source: .Blood BLOOD  Culture Results:   No growth at 4 days    Culture - Blood (02.05.25 @ 19:45)   Specimen Source: .Blood BLOOD  Culture Results:   No growth at 4 days    < from: TTE W or WO Ultrasound Enhancing Agent (02.06.25 @ 16:01) >  CONCLUSIONS:      1. Left ventricular cavity is severely dilated. Left ventricular systolic function is mildly decreased with an ejection fraction of 41 % by Little's method of disks. Regional wall motion abnormalities present.   2. There is severe (grade 3) left ventricular diastolic dysfunction, with elevated left ventricular filling pressure.   3. Normal right ventricular cavity size and normal right ventricular systolic function.   4. Moderate to severe mitral regurgitation.   5. Estimated pulmonary artery systolic pressure is 44 mmHg, consistent with mild pulmonary hypertension.   6. Compared to the transthoracic echocardiogram performed on 7/10/2023, there is an increase in the degree of mitral regurgitation and grade 3 diastolic dysfunction.    < end of copied text >

## 2025-02-10 NOTE — PROGRESS NOTE ADULT - SUBJECTIVE AND OBJECTIVE BOX
Follow-up Pulm Progress Note    No new respiratory events overnight.  Denies SOB/CP.   cough improving     Medications:  MEDICATIONS  (STANDING):  albuterol/ipratropium for Nebulization 3 milliLiter(s) Nebulizer every 6 hours  aluminum hydroxide/magnesium hydroxide/simethicone Suspension 30 milliLiter(s) Oral once  aMIOdarone    Tablet 100 milliGRAM(s) Oral daily  apixaban 2.5 milliGRAM(s) Oral every 12 hours  dapagliflozin 10 milliGRAM(s) Oral daily  furosemide   Injectable 40 milliGRAM(s) IV Push daily  guaiFENesin Oral Liquid (Sugar-Free) 100 milliGRAM(s) Oral every 6 hours  levothyroxine 112 MICROGram(s) Oral daily  metoprolol succinate ER 12.5 milliGRAM(s) Oral daily  oseltamivir 30 milliGRAM(s) Oral daily  pantoprazole    Tablet 40 milliGRAM(s) Oral before breakfast  sodium chloride 0.9%. 1000 milliLiter(s) (50 mL/Hr) IV Continuous <Continuous>  spironolactone 12.5 milliGRAM(s) Oral daily  sucralfate 1 Gram(s) Oral two times a day    MEDICATIONS  (PRN):  acetaminophen     Tablet .. 650 milliGRAM(s) Oral every 6 hours PRN Temp greater or equal to 38C (100.4F), Mild Pain (1 - 3)  melatonin 3 milliGRAM(s) Oral at bedtime PRN Insomnia  ondansetron Injectable 4 milliGRAM(s) IV Push every 8 hours PRN Nausea and/or Vomiting          Vital Signs Last 24 Hrs  T(C): 36.4 (10 Feb 2025 04:28), Max: 36.9 (09 Feb 2025 20:40)  T(F): 97.6 (10 Feb 2025 04:28), Max: 98.5 (09 Feb 2025 20:40)  HR: 66 (10 Feb 2025 04:28) (66 - 70)  BP: 98/62 (10 Feb 2025 04:28) (95/58 - 108/67)  BP(mean): --  RR: 18 (10 Feb 2025 04:28) (16 - 18)  SpO2: 93% (10 Feb 2025 04:28) (93% - 96%)    Parameters below as of 10 Feb 2025 04:28  Patient On (Oxygen Delivery Method): room air              02-09 @ 07:01  -  02-10 @ 07:00  --------------------------------------------------------  IN: 744 mL / OUT: 650 mL / NET: 94 mL          LABS:                        11.7   2.64  )-----------( 185      ( 09 Feb 2025 07:09 )             37.0     02-10    138  |  101  |  28[H]  ----------------------------<  102[H]  4.8   |  22  |  1.07    Ca    9.3      10 Feb 2025 06:59  Mg     2.4     02-10            CAPILLARY BLOOD GLUCOSE          Urinalysis Basic - ( 10 Feb 2025 06:59 )    Color: x / Appearance: x / SG: x / pH: x  Gluc: 102 mg/dL / Ketone: x  / Bili: x / Urobili: x   Blood: x / Protein: x / Nitrite: x   Leuk Esterase: x / RBC: x / WBC x   Sq Epi: x / Non Sq Epi: x / Bacteria: x              CULTURES:     Culture - Blood (collected 02-05-25 @ 20:00)  Source: .Blood BLOOD  Preliminary Report (02-10-25 @ 01:01):    No growth at 4 days    Culture - Blood (collected 02-05-25 @ 19:45)  Source: .Blood BLOOD  Preliminary Report (02-10-25 @ 01:01):    No growth at 4 days                    Physical Examination:  PULM: grossly clear   CVS: S1, S2 heard    RADIOLOGY REVIEWED    CT chest:  < from: CT Chest No Cont (02.05.25 @ 18:11) >    FINDINGS:    Tubes/Lines: None.    Lungs, airways and pleura: Bilateral septal thickening and groundglass   opacities. There are patchy foci consolidation, some which are branching   in the bilateral lower lobes. The largest opacities in the left lower   lobe measures 1.2 x 0.7 cm (series 4 image 120).    There are secretions in the posterior segmental bronchi of the right   lower lobe. There are secretions in the trachea and right mainstem   bronchus. No pneumothorax. Scant bilateral pleural effusions..    Mediastinum: The distal esophagus is thickened.    The thyroid gland is not visualized. The chest lymph nodes measure less   than 10 mm in the short axis.    Left-sided aortic arch and left-sided descending thoracic aorta. Aortic   calcifications. The heart is enlarged. Biventricular AICD.    Upper Abdomen: The upper abdomen is unremarkable.    Bones And Soft Tissues: Spondylosis of the thoracic spine.  The soft   tissues are unremarkable.      IMPRESSION:    1.  Bilateral groundglass opacities and septal thickening are of   uncertain etiology.  2.  Bilateral patchy consolidation, some which are branching in the lower   lobes are of uncertain etiology.  3.  The distal esophagus is thickened.  4.  CT chest without contrast in 8 weeks is recommended to evaluate for   resolution/change.    --- End of Report ---    < end of copied text >

## 2025-02-10 NOTE — PROGRESS NOTE ADULT - SUBJECTIVE AND OBJECTIVE BOX
DATE OF SERVICE: 02-10-25 @ 14:09    Patient is a 89y old  Female who presents with a chief complaint of CP (10 Feb 2025 10:33)      INTERVAL HISTORY: feels okay    REVIEW OF SYSTEMS:  CONSTITUTIONAL: No weakness  EYES/ENT: No visual changes;  No throat pain   NECK: No pain or stiffness  RESPIRATORY: No cough, wheezing; No shortness of breath  CARDIOVASCULAR: No chest pain or palpitations  GASTROINTESTINAL: No abdominal  pain. No nausea, vomiting, or hematemesis  GENITOURINARY: No dysuria, frequency or hematuria  NEUROLOGICAL: No stroke like symptoms  SKIN: No rashes    TELEMETRY Personally reviewed: V paced 70s      	  MEDICATIONS:  aMIOdarone    Tablet 100 milliGRAM(s) Oral daily  furosemide   Injectable 40 milliGRAM(s) IV Push daily  metoprolol succinate ER 12.5 milliGRAM(s) Oral daily  spironolactone 12.5 milliGRAM(s) Oral daily        PHYSICAL EXAM:  T(C): 37 (02-10-25 @ 10:58), Max: 37 (02-10-25 @ 10:58)  HR: 76 (02-10-25 @ 10:58) (66 - 83)  BP: 98/63 (02-10-25 @ 10:58) (98/62 - 108/67)  RR: 18 (02-10-25 @ 10:58) (18 - 18)  SpO2: 97% (02-10-25 @ 10:58) (93% - 97%)  Wt(kg): --  I&O's Summary    09 Feb 2025 07:01  -  10 Feb 2025 07:00  --------------------------------------------------------  IN: 744 mL / OUT: 650 mL / NET: 94 mL    10 Feb 2025 07:01  -  10 Feb 2025 14:09  --------------------------------------------------------  IN: 480 mL / OUT: 0 mL / NET: 480 mL          Appearance: In no distress	  HEENT:    PERRL, EOMI	  Cardiovascular:  S1 S2, No JVD  Respiratory: Lungs clear to auscultation	  Gastrointestinal:  Soft, Non-tender, + BS	  Vascularature:  No edema of LE  Psychiatric: Appropriate affect   Neuro: no acute focal deficits                               11.7   2.64  )-----------( 185      ( 09 Feb 2025 07:09 )             37.0     02-10    138  |  101  |  28[H]  ----------------------------<  102[H]  4.8   |  22  |  1.07    Ca    9.3      10 Feb 2025 06:59  Mg     2.4     02-10          Labs personally reviewed      ASSESSMENT/PLAN: 	    89F pmh HFrEF(35%), Afib on eliquis, CRT-D, HTN, hypothyroid coming in after recent bout of URI/pneumonia s/p course oral antibiotics.  Symptoms resumed few days ago and today she developed lower chest discomfort about 2 cm above her lower border of the sternum. Now unable to tolerate PO, everything she eats even liquids hurts her substantially.     Problem/Plan #1: Chest Pain  - Will review ECG although given she has CRT may not be diagnostic for ischemia  - Trop 45-->47 similar to prior admission    Problem/Plan #2: Acute Systolic Heart Failure  - s/p CRT  - CT shows B/L GGOs, septal thickening, B/L patchy consolidation, thickening of distal esophagus  - TTE 2/6 - EF 41%, WMA (stable compared to 2023), severe grade 3 DD, significant elevated filling pressure, mod-severe MR  - Resume Lasix 40mg IV daily  - c/w sHF GDMT:  --- Toprol 12.5mg PO daily  --- Spironolactone 12.5mg PO daily  --- Recommend resuming Jardiance (home dose)    --- Soft BP precludes further uptitration             Problem/Plan #3: Atrial Fibrillation  - c/w amiodarone 100mg PO daily  - c/w Toprol 12.5mg PO daily  - c/w Eliquis 2.5mg PO BID    Problem/Plan #4: Influenza A  - c/w tamiflu  - c/w Abx as per primary team  - c/w supplemental oxygen and wean as tolerated          Iolani Behrbom, AG-JUANITO Salgado DO [facility]  Cardiovascular Medicine  [address] [place] [address], [address]  Available through call or text on [employer]  Office: [phone]   DATE OF SERVICE: 02-10-25 @ 14:09    Patient is a 89y old  Female who presents with a chief complaint of CP (10 Feb 2025 10:33)      INTERVAL HISTORY: feels okay    REVIEW OF SYSTEMS:  CONSTITUTIONAL: No weakness  EYES/ENT: No visual changes;  No throat pain   NECK: No pain or stiffness  RESPIRATORY: No cough, wheezing; No shortness of breath  CARDIOVASCULAR: No chest pain or palpitations  GASTROINTESTINAL: No abdominal  pain. No nausea, vomiting, or hematemesis  GENITOURINARY: No dysuria, frequency or hematuria  NEUROLOGICAL: No stroke like symptoms  SKIN: No rashes    TELEMETRY Personally reviewed: V paced 70s      	  MEDICATIONS:  aMIOdarone    Tablet 100 milliGRAM(s) Oral daily  furosemide   Injectable 40 milliGRAM(s) IV Push daily  metoprolol succinate ER 12.5 milliGRAM(s) Oral daily  spironolactone 12.5 milliGRAM(s) Oral daily        PHYSICAL EXAM:  T(C): 37 (02-10-25 @ 10:58), Max: 37 (02-10-25 @ 10:58)  HR: 76 (02-10-25 @ 10:58) (66 - 83)  BP: 98/63 (02-10-25 @ 10:58) (98/62 - 108/67)  RR: 18 (02-10-25 @ 10:58) (18 - 18)  SpO2: 97% (02-10-25 @ 10:58) (93% - 97%)  Wt(kg): --  I&O's Summary    09 Feb 2025 07:01  -  10 Feb 2025 07:00  --------------------------------------------------------  IN: 744 mL / OUT: 650 mL / NET: 94 mL    10 Feb 2025 07:01  -  10 Feb 2025 14:09  --------------------------------------------------------  IN: 480 mL / OUT: 0 mL / NET: 480 mL          Appearance: In no distress	  HEENT:    PERRL, EOMI	  Cardiovascular:  S1 S2, No JVD  Respiratory: Lungs clear to auscultation	  Gastrointestinal:  Soft, Non-tender, + BS	  Vascularature:  No edema of LE  Psychiatric: Appropriate affect   Neuro: no acute focal deficits                               11.7   2.64  )-----------( 185      ( 09 Feb 2025 07:09 )             37.0     02-10    138  |  101  |  28[H]  ----------------------------<  102[H]  4.8   |  22  |  1.07    Ca    9.3      10 Feb 2025 06:59  Mg     2.4     02-10          Labs personally reviewed      ASSESSMENT/PLAN: 	    89F pmh HFrEF(35%), Afib on eliquis, CRT-D, HTN, hypothyroid coming in after recent bout of URI/pneumonia s/p course oral antibiotics.  Symptoms resumed few days ago and today she developed lower chest discomfort about 2 cm above her lower border of the sternum. Now unable to tolerate PO, everything she eats even liquids hurts her substantially.     Problem/Plan #1: Chest Pain  - Will review ECG although given she has CRT may not be diagnostic for ischemia  - Trop 45-->47 similar to prior admission    Problem/Plan #2: Acute Systolic Heart Failure  - s/p CRT  - CT shows B/L GGOs, septal thickening, B/L patchy consolidation, thickening of distal esophagus  - TTE 2/6 - EF 41%, WMA (stable compared to 2023), severe grade 3 DD, significant elevated filling pressure, mod-severe MR  - Resume Lasix 40mg IV daily  - c/w sHF GDMT:  --- Toprol 12.5mg PO daily  --- Spironolactone 12.5mg PO daily  --- Recommend resuming Jardiance (home dose)    --- Soft BP precludes further uptitration             Problem/Plan #3: Atrial Fibrillation  - c/w amiodarone 100mg PO daily  - c/w Toprol 12.5mg PO daily  - c/w Eliquis 2.5mg PO BID           Iolani Behrbom, AG-JUANITO Salgado DO [facility]  Cardiovascular Medicine  [address], [address]  Available through call or text on [employer] TEAM[employer]  Office: [phone]

## 2025-02-11 ENCOUNTER — TRANSCRIPTION ENCOUNTER (OUTPATIENT)
Age: 89
End: 2025-02-11

## 2025-02-11 VITALS
TEMPERATURE: 98 F | HEART RATE: 77 BPM | DIASTOLIC BLOOD PRESSURE: 67 MMHG | RESPIRATION RATE: 18 BRPM | OXYGEN SATURATION: 96 % | SYSTOLIC BLOOD PRESSURE: 108 MMHG

## 2025-02-11 LAB
ANION GAP SERPL CALC-SCNC: 13 MMOL/L — SIGNIFICANT CHANGE UP (ref 5–17)
BUN SERPL-MCNC: 32 MG/DL — HIGH (ref 7–23)
CALCIUM SERPL-MCNC: 9.5 MG/DL — SIGNIFICANT CHANGE UP (ref 8.4–10.5)
CHLORIDE SERPL-SCNC: 99 MMOL/L — SIGNIFICANT CHANGE UP (ref 96–108)
CO2 SERPL-SCNC: 25 MMOL/L — SIGNIFICANT CHANGE UP (ref 22–31)
CREAT SERPL-MCNC: 1.17 MG/DL — SIGNIFICANT CHANGE UP (ref 0.5–1.3)
CULTURE RESULTS: SIGNIFICANT CHANGE UP
CULTURE RESULTS: SIGNIFICANT CHANGE UP
EGFR: 45 ML/MIN/1.73M2 — LOW
GLUCOSE SERPL-MCNC: 95 MG/DL — SIGNIFICANT CHANGE UP (ref 70–99)
HAEM INFLU B AB SER-MCNC: 0.16 UG/ML — SIGNIFICANT CHANGE UP
MAGNESIUM SERPL-MCNC: 2.5 MG/DL — SIGNIFICANT CHANGE UP (ref 1.6–2.6)
POTASSIUM SERPL-MCNC: 4.5 MMOL/L — SIGNIFICANT CHANGE UP (ref 3.5–5.3)
POTASSIUM SERPL-SCNC: 4.5 MMOL/L — SIGNIFICANT CHANGE UP (ref 3.5–5.3)
SODIUM SERPL-SCNC: 137 MMOL/L — SIGNIFICANT CHANGE UP (ref 135–145)
SPECIMEN SOURCE: SIGNIFICANT CHANGE UP
SPECIMEN SOURCE: SIGNIFICANT CHANGE UP

## 2025-02-11 PROCEDURE — 36415 COLL VENOUS BLD VENIPUNCTURE: CPT

## 2025-02-11 PROCEDURE — 82947 ASSAY GLUCOSE BLOOD QUANT: CPT

## 2025-02-11 PROCEDURE — 97161 PT EVAL LOW COMPLEX 20 MIN: CPT

## 2025-02-11 PROCEDURE — C8929: CPT

## 2025-02-11 PROCEDURE — 96375 TX/PRO/DX INJ NEW DRUG ADDON: CPT

## 2025-02-11 PROCEDURE — 84484 ASSAY OF TROPONIN QUANT: CPT

## 2025-02-11 PROCEDURE — 71250 CT THORAX DX C-: CPT | Mod: MC

## 2025-02-11 PROCEDURE — 87899 AGENT NOS ASSAY W/OPTIC: CPT

## 2025-02-11 PROCEDURE — 85014 HEMATOCRIT: CPT

## 2025-02-11 PROCEDURE — 94640 AIRWAY INHALATION TREATMENT: CPT

## 2025-02-11 PROCEDURE — 82803 BLOOD GASES ANY COMBINATION: CPT

## 2025-02-11 PROCEDURE — 85730 THROMBOPLASTIN TIME PARTIAL: CPT

## 2025-02-11 PROCEDURE — 82435 ASSAY OF BLOOD CHLORIDE: CPT

## 2025-02-11 PROCEDURE — 71045 X-RAY EXAM CHEST 1 VIEW: CPT

## 2025-02-11 PROCEDURE — 83880 ASSAY OF NATRIURETIC PEPTIDE: CPT

## 2025-02-11 PROCEDURE — 87040 BLOOD CULTURE FOR BACTERIA: CPT

## 2025-02-11 PROCEDURE — 0241U: CPT

## 2025-02-11 PROCEDURE — 99285 EMERGENCY DEPT VISIT HI MDM: CPT

## 2025-02-11 PROCEDURE — 85027 COMPLETE CBC AUTOMATED: CPT

## 2025-02-11 PROCEDURE — 80048 BASIC METABOLIC PNL TOTAL CA: CPT

## 2025-02-11 PROCEDURE — 83605 ASSAY OF LACTIC ACID: CPT

## 2025-02-11 PROCEDURE — 0225U NFCT DS DNA&RNA 21 SARSCOV2: CPT

## 2025-02-11 PROCEDURE — 96374 THER/PROPH/DIAG INJ IV PUSH: CPT

## 2025-02-11 PROCEDURE — 85025 COMPLETE CBC W/AUTO DIFF WBC: CPT

## 2025-02-11 PROCEDURE — 87637 SARSCOV2&INF A&B&RSV AMP PRB: CPT

## 2025-02-11 PROCEDURE — 83735 ASSAY OF MAGNESIUM: CPT

## 2025-02-11 PROCEDURE — 85610 PROTHROMBIN TIME: CPT

## 2025-02-11 PROCEDURE — 85018 HEMOGLOBIN: CPT

## 2025-02-11 PROCEDURE — 84145 PROCALCITONIN (PCT): CPT

## 2025-02-11 PROCEDURE — 84295 ASSAY OF SERUM SODIUM: CPT

## 2025-02-11 PROCEDURE — 97110 THERAPEUTIC EXERCISES: CPT

## 2025-02-11 PROCEDURE — 87449 NOS EACH ORGANISM AG IA: CPT

## 2025-02-11 PROCEDURE — 80053 COMPREHEN METABOLIC PANEL: CPT

## 2025-02-11 PROCEDURE — 84132 ASSAY OF SERUM POTASSIUM: CPT

## 2025-02-11 PROCEDURE — 97116 GAIT TRAINING THERAPY: CPT

## 2025-02-11 PROCEDURE — 86684 HEMOPHILUS INFLUENZA ANTIBDY: CPT

## 2025-02-11 PROCEDURE — 86738 MYCOPLASMA ANTIBODY: CPT

## 2025-02-11 PROCEDURE — 82330 ASSAY OF CALCIUM: CPT

## 2025-02-11 RX ORDER — SUCRALFATE 1 G/10ML
1 SUSPENSION ORAL
Qty: 60 | Refills: 0
Start: 2025-02-11 | End: 2025-03-12

## 2025-02-11 RX ADMIN — AMIODARONE HYDROCHLORIDE 100 MILLIGRAM(S): 50 INJECTION, SOLUTION INTRAVENOUS at 06:28

## 2025-02-11 RX ADMIN — PANTOPRAZOLE 40 MILLIGRAM(S): 20 TABLET, DELAYED RELEASE ORAL at 06:27

## 2025-02-11 RX ADMIN — Medication 100 MILLIGRAM(S): at 11:15

## 2025-02-11 RX ADMIN — IPRATROPIUM BROMIDE AND ALBUTEROL SULFATE 3 MILLILITER(S): .5; 2.5 SOLUTION RESPIRATORY (INHALATION) at 11:16

## 2025-02-11 RX ADMIN — Medication 30 MILLILITER(S): at 00:00

## 2025-02-11 RX ADMIN — IPRATROPIUM BROMIDE AND ALBUTEROL SULFATE 3 MILLILITER(S): .5; 2.5 SOLUTION RESPIRATORY (INHALATION) at 06:27

## 2025-02-11 RX ADMIN — APIXABAN 2.5 MILLIGRAM(S): 5 TABLET, FILM COATED ORAL at 06:27

## 2025-02-11 RX ADMIN — SUCRALFATE 1 GRAM(S): 1 SUSPENSION ORAL at 06:28

## 2025-02-11 RX ADMIN — Medication 12.5 MILLIGRAM(S): at 06:28

## 2025-02-11 RX ADMIN — Medication 100 MILLIGRAM(S): at 06:26

## 2025-02-11 RX ADMIN — LEVOTHYROXINE SODIUM 112 MICROGRAM(S): 25 TABLET ORAL at 06:27

## 2025-02-11 RX ADMIN — Medication 40 MILLIGRAM(S): at 06:26

## 2025-02-11 RX ADMIN — DAPAGLIFLOZIN 10 MILLIGRAM(S): 5 TABLET, FILM COATED ORAL at 11:15

## 2025-02-11 RX ADMIN — Medication 100 MILLIGRAM(S): at 01:40

## 2025-02-11 RX ADMIN — Medication 12.5 MILLIGRAM(S): at 06:27

## 2025-02-11 NOTE — DISCHARGE NOTE PROVIDER - DETAILS OF MALNUTRITION DIAGNOSIS/DIAGNOSES
This patient has been assessed with a concern for Malnutrition and was treated during this hospitalization for the following Nutrition diagnosis/diagnoses:     -  02/07/2025: Underweight (BMI < 19)

## 2025-02-11 NOTE — DISCHARGE NOTE PROVIDER - NSDCCPCAREPLAN_GEN_ALL_CORE_FT
PRINCIPAL DISCHARGE DIAGNOSIS  Diagnosis: Acute hypoxic respiratory failure  Assessment and Plan of Treatment: Pneumonia is a lung infection that can cause a fever, cough, and trouble breathing.  Continue all antibiotics as ordered until complete.  Nutrition is important, eat small frequent meals.  Get lots of rest and drink fluids.  Call your health care provider upon arrival home from hospital and make a follow up appointment for one week.  If your cough worsens, you develop fever greater than 101', you have shaking chills, a fast heartbeat, trouble breathing and/or feel your are breathing much faster than usual, call your healthcare provider.  Make sure you wash your hands frequently.        SECONDARY DISCHARGE DIAGNOSES  Diagnosis: CHF (congestive heart failure)  Assessment and Plan of Treatment: Weigh yourself daily.  If you gain 3lbs in 3 days, or 5lbs in a week call your Health Care Provider.  Do not eat or drink foods containing more than 2000mg of salt (sodium) in your diet every day.  Call your Health Care Provider if you have any swelling or increased swelling in your feet, ankles, and/or stomach.  The Pt was provided with CHF diet instruction (low sodium diet, daily weights, label reading, Heart Healthy Cooking Tips & Heart Healthy shopping Tips).  Take all of your medication as directed.  If you become dizzy call your Health Care Provider.      Diagnosis: Influenza A  Assessment and Plan of Treatment:     Diagnosis: Pneumonia due to influenza A virus  Assessment and Plan of Treatment:

## 2025-02-11 NOTE — DISCHARGE NOTE PROVIDER - NSFOLLOWUPCLINICS_GEN_ALL_ED_FT
[facility] - HF  Cardiology  [facility], [address] [place] [address]   Phone:   Fax:   Follow Up Time: 1-3 days    Home [facility]  Pulmonary  [place]   Phone:   Fax:   Follow Up Time: 1-3 days

## 2025-02-11 NOTE — PROGRESS NOTE ADULT - NSPROGADDITIONALINFOA_GEN_ALL_CORE
Discussed with Attending
D/C Planing with outpatient follow up
Patient seen and examined by me. patient care and plan discussed and reviewed with PA. Plan as outlined above edited by me to reflect our discussion. Advanced care planning/advanced directives discussed with patient/family. DNR status including forceful chest compressions to attempt to restart the heart, ventilator support/artificial breathing, electric shock, artificial nutrition, health care proxy, Molst form all discussed with pt. More than 50% of the visit was spent counseling and/or coordinating care by the attending physician.     Discussed with patient PMD

## 2025-02-11 NOTE — PROGRESS NOTE ADULT - PROBLEM SELECTOR PLAN 3
by hx   -CT likely with mild volume overload, no pl effusions   -TTE with EF 41%, severe (grade 3) LV diastolic dysfunction, with elevated left ventricular filling pressure.  -Diureses per cards
- IV Lasix  - Metoprolol  - Spironolactone  - Jardiance to resume when off IV diuretics   - I&O, daily wt   - telemetry
by hx   -CT likely with mild volume overload, no pl effusions   -S/p gentle diuresis per cards.
c/w diuresis   started on BBlocker and aldactone
by hx   -CT likely with mild volume overload, no pl effusions   -TTE with EF 41%, severe (grade 3) LV diastolic dysfunction, with elevated left ventricular filling pressure.  -Diureses per cards
- IV Lasix  - Metoprolol  - Spironolactone  - Jardiance to resume when off IV diuretics   - I&O, daily wt   - telemetry  - TTE as above. F/u cardio outpatient
- IV Lasix  - Metoprolol  - Spironolactone  - Jardiance to resume when off IV diuretics   - I&O, daily wt   - telemetry  - TTE as above. F/u cardio outpatient

## 2025-02-11 NOTE — PROGRESS NOTE ADULT - ASSESSMENT
89F pmh HFrEF(35%), Afib on eliquis, CRT-D, HTN, hypothyroid s/p recent bout of URI/pneumonia s/p course oral antibiotics p/w recurrent sx and CP found to be Flu A+ a/f flu tx and CP w/u     
89F pmh HFrEF(35%), Afib on eliquis, CRT-D, HTN, hypothyroid s/p recent bout of URI/pneumonia s/p course oral antibiotics p/w recurrent sx and CP found to be Flu A+ a/f flu tx and CP w/u      Problem/Plan - 1:  ·  Problem: Angina pectoris.   denies any CP  seen by cardio      Problem/Plan - 2:  ·  Problem: Influenza A.   ·  Plan: better , denies any SOB at present   c/w lasix   c/w meds   pul following.     Problem/Plan - 3:  ·  Problem: Chronic systolic heart failure.   ·  Plan: c/w diuresis   started on BBlocker and aldactone.   lasix 40 mg IV daily  jardiance      Problem/Plan - 4:  ·  Problem: Chronic atrial fibrillation.   ·  Plan: - Amiodarone   - Eliquis   - Telemetry.     Problem/Plan - 5:  ·  Problem: Hypothyroid.   ·  Plan: - Levothyroxine.    dispo: stable to be d/c if cleared by cardio    ehsan gao MD  
89F pmh HFrEF(35%), Afib on eliquis, CRT-D, HTN, hypothyroid s/p recent bout of URI/pneumonia s/p course oral antibiotics p/w recurrent sx and CP found to be Flu A+ a/f flu tx and CP w/u      Problem/Plan - 1:  ·  Problem: Angina pectoris.   ·  Plan: better today , denies any CP  seen by cardio      Problem/Plan - 2:  ·  Problem: Influenza A.   ·  Plan: better , denies any SOB at present   c/w lasix   c/w meds   pul following.     Problem/Plan - 3:  ·  Problem: Chronic systolic heart failure.   ·  Plan: c/w diuresis   started on BBlocker and aldactone.  add lasix 40 mg IV daily  restart farxiga      Problem/Plan - 4:  ·  Problem: Chronic atrial fibrillation.   ·  Plan: - Amiodarone   - Eliquis   - Telemetry.     Problem/Plan - 5:  ·  Problem: Hypothyroid.   ·  Plan: - Levothyroxine.    ehsan gao MD  
89F pmh HFrEF(35%), Afib on eliquis, CRT-D, HTN, hypothyroid s/p recent bout of URI/pneumonia s/p course oral antibiotics p/w recurrent sx and CP found to be Flu A+ a/f flu tx and CP w/u     
89 y/o F with PMH of HFrEF (35%), Afib on Eliquis CRT-D, HTN, hypothyroid. Presents after recent bout of URI - pt states she was seen at urgent care a few days prior to admission and was negative for the flu. She states her CXR had no PNA. Her cough persisted, had fevers as an outpatient then she developed lower chest discomfort about 2 cm above her lower border of the sternum. Now unable to tolerate PO, everything she eats even liquids hurts her substantially. She states she is now able to tolerate PO intake. RVP +influenza A, CT chest with PNA. 
89F pmh HFrEF(35%), Afib on eliquis, CRT-D, HTN, hypothyroid s/p recent bout of URI/pneumonia s/p course oral antibiotics p/w recurrent sx and CP found to be Flu A+ a/f flu tx and CP w/u     
89F pmh HFrEF(35%), Afib on eliquis, CRT-D, HTN, hypothyroid s/p recent bout of URI/pneumonia s/p course oral antibiotics p/w recurrent sx and CP found to be Flu A+ a/f flu tx and CP w/u

## 2025-02-11 NOTE — PROGRESS NOTE ADULT - NUTRITIONAL ASSESSMENT
This patient has been assessed with a concern for Malnutrition and has been determined to have a diagnosis/diagnoses of Underweight (BMI < 19).    This patient is being managed with:   Diet DASH/TLC-  Sodium & Cholesterol Restricted  Entered: Feb 5 2025  9:39PM

## 2025-02-11 NOTE — PROGRESS NOTE ADULT - PROBLEM SELECTOR PLAN 1
- P/w CP iso flu like sx, found to be Flu A+  - EKG: Vpaced   - Trop:  47 > 45,  BNP: 5398 ( hx/o CHF rEF w/ acute illness)     - CXR: no acute cardiopulmonary disease  - CT chest: b/l GGO & patchy consolidation    - ACS unlikely. CP could me 2/2 acute URI but pt high risk and will benefit from further cardiac eval   - TTE w/ Reduced EF of 41 %, + WMA, Severe (grade 3) LVDD,  Moderate to severe MR, mild pulmonary hypertension.  - Tele monitoring   - Chest discomfort could also be component of GERD as pt reports discomfort in " lower chest / esophagus" with intake. Will given pantoprazole, Carafate.    - Cardio eval appreciated; F/u recs --> GDMT as per Cardio - On Toprol, Lasix, Jardiance, Aldactone   - C/w IV Lasix 40 Qd. Monitor I and O   - Outpatient cardio follow up for further ischemic work up   - Outpatient structural heart eval for MR valvular changes
RVP +influenza A  -Continue Tamiflu (to be completed today)   -Continue Duoneb q6h, d/c nebs on discharge   -Continue Robitussin q6h, change to q6h PRN on discharge   -Supportive care.
RVP +influenza A  -Continue Tamiflu  -Continue Duoneb q6h  -Continue Robitussin q6h  -Supportive care.
better today , denies any CP  seen by cardio   f/u recommendation
RVP +influenza A  -Continue Tamiflu (to be completed today)   -Continue Duoneb q6h, d/c nebs on discharge   -Continue Robitussin q6h, change to q6h PRN on discharge   -Supportive care.
- P/w CP iso flu like sx, found to be Flu A+  - EKG: Vpaced   - Trop:  47 > 45,  BNP: 5398 ( hx/o CHF rEF w/ acute illness)     - CXR: no acute cardiopulmonary disease  - CT chest: b/l GGO & patchy consolidation    - ACS unlikely. CP could me 2/2 acute URI but pt high risk and will benefit from further cardiac eval   - TTE w/ Reduced EF of 41 %, + WMA, Severe (grade 3) LVDD,  Moderate to severe MR, mild pulmonary hypertension.  - Tele monitoring   - Chest discomfort could also be component of GERD as pt reports discomfort in " lower chest / esophagus" with intake. Will given pantoprazole, Carafate.    - Cardio eval appreciated; F/u recs --> GDMT as per Cardio - On Toprol, Lasix, Jardiance, Aldactone   - lasix switched to home dose 20 oral daily   - Outpatient cardio follow up for further ischemic work up   - Outpatient structural heart eval for MR valvular changes
- P/w CP iso flu like sx, found to be Flu A+  - EKG: Vpaced   - Trop:  47 > 45,  BNP: 5398 ( hx/o CHF rEF w/ acute illness)     - CXR: no acute cardiopulmonary disease  - CT chest: b/l GGO & patchy consolidation    - ACS unlikely. CP could me 2/2 acute URI but pt high risk and will benefit from further cardiac eval   - Check echo (ordered)   - Tele monitoring   - card evalc alled   - Chest discomfort could also be component of GERD as pt reports discomfort in " lower chest / esophagus" with intake. Will given pantoprazole, Carafate. If sx persist consider CT A/P, GI eval

## 2025-02-11 NOTE — DISCHARGE NOTE PROVIDER - NS AS DC PROVIDER CONTACT Y/N MULTI
Laceration Repair    Date/Time: 6/18/2023 12:45 PM  Performed by: Damion Driscoll PA-C  Authorized by: Damion Driscoll PA-C   Consent Done: Yes  Consent: Verbal consent obtained.  Risks and benefits: risks, benefits and alternatives were discussed  Consent given by: patient  Body area: head/neck  Location details: forehead  Laceration length: 5 cm  Foreign bodies: no foreign bodies  Tendon involvement: none  Nerve involvement: none  Vascular damage: no  Anesthesia: local infiltration    Anesthesia:  Local Anesthetic: lidocaine 1% without epinephrine  Anesthetic total: 4 mL    Patient sedated: no  Preparation: Patient was prepped and draped in the usual sterile fashion.  Irrigation solution: saline  Irrigation method: jet lavage  Amount of cleaning: extensive (chlorhexidine and betadine)  Debridement: none  Skin closure: 4-0 nylon  Number of sutures: 5  Approximation: close  Approximation difficulty: complex  Dressing: antibiotic ointment and pressure/compression dressing  Patient tolerance: Patient tolerated the procedure well with no immediate complications      
Yes

## 2025-02-11 NOTE — DISCHARGE NOTE NURSING/CASE MANAGEMENT/SOCIAL WORK - FINANCIAL ASSISTANCE
[facility] provides services at a reduced cost to those who are determined to be eligible through [facility]’s financial assistance program. Information regarding [facility]’s financial assistance program can be found by going to [email or web address] or by calling [phone].

## 2025-02-11 NOTE — DISCHARGE NOTE NURSING/CASE MANAGEMENT/SOCIAL WORK - PATIENT PORTAL LINK FT
You can access the FollowMyHealth Patient Portal offered by [facility] by registering at the following website: [email or web address]. By joining [facility] FollowMyHealth portal, you will also be able to view your health information using other applications (apps) compatible with our system.

## 2025-02-11 NOTE — PROGRESS NOTE ADULT - NS ATTEND AMEND GEN_ALL_CORE FT
Patient care and plan discussed and reviewed with Advanced Care Provider. Plan as outlined above edited by me to reflect our discussion.   In addition, I participated in    - Ordering, reviewing, and interpreting labs, testing, and imaging.  - Reviewing prior hospitalization and where necessary, outpatient records.  - Counselling and educating patient and/or family regarding interpretation of aforementioned items and plan of care.
Patient seen and examined by me. patient care and plan discussed and reviewed with PA. Plan as outlined above edited by me to reflect our discussion. Advanced care planning/advanced directives discussed with patient/family. DNR status including forceful chest compressions to attempt to restart the heart, ventilator support/artificial breathing, electric shock, artificial nutrition, health care proxy, Molst form all discussed with pt. More than 50% of the visit was spent counseling and/or coordinating care by the attending physician.

## 2025-02-11 NOTE — PROGRESS NOTE ADULT - PROBLEM SELECTOR PLAN 4
- Metoprolol  - Amiodarone   - Eliquis   - Telemetry
- Metoprolol  - Amiodarone   - Eliquis   - Telemetry
- Amiodarone   - Eliquis   - Telemetry
- Metoprolol  - Amiodarone   - Eliquis   - Telemetry

## 2025-02-11 NOTE — DISCHARGE NOTE PROVIDER - CARE PROVIDER_API CALL
Prince Fink  Cardiology  [address] [place] [address]  Phone: [phone]  Fax: [phone]  Follow Up Time: 2 weeks    Fabián Osuna.  Pulmonary Disease  [address]  [place] [address]  Phone: [phone]  Fax: [phone]  Follow Up Time: 2 weeks

## 2025-02-11 NOTE — PROGRESS NOTE ADULT - PROBLEM SELECTOR PROBLEM 3
Chronic systolic heart failure
CHF (congestive heart failure)
Chronic systolic heart failure

## 2025-02-11 NOTE — DISCHARGE NOTE PROVIDER - NSDCMRMEDTOKEN_GEN_ALL_CORE_FT
amiodarone 200 mg oral tablet: 0.5 tab(s) orally once a day  Eliquis 2.5 mg oral tablet: 1 tab(s) orally 2 times a day  famotidine 40 mg oral tablet: 1 tab(s) orally once a day  guaiFENesin 100 mg/5 mL oral liquid: 5 milliliter(s) orally every 6 hours as needed for  cough  Jardiance 10 mg oral tablet: 1 tab(s) orally once a day  levothyroxine 112 mcg (0.112 mg) oral tablet: 1 tab(s) orally once a day  metoprolol succinate 25 mg oral tablet, extended release: 0.5 tab(s) orally once a day  spironolactone 25 mg oral tablet: 0.5 tab(s) orally once a day   Complex Repair And Double Advancement Flap Text: The defect edges were debeveled with a #15 scalpel blade.  The primary defect was closed partially with a complex linear closure.  Given the location of the remaining defect, shape of the defect and the proximity to free margins a double advancement flap was deemed most appropriate for complete closure of the defect.  Using a sterile surgical marker, an appropriate advancement flap was drawn incorporating the defect and placing the expected incisions within the relaxed skin tension lines where possible.    The area thus outlined was incised deep to adipose tissue with a #15 scalpel blade.  The skin margins were undermined to an appropriate distance in all directions utilizing iris scissors.

## 2025-02-11 NOTE — DISCHARGE NOTE PROVIDER - HOSPITAL COURSE
HPI:  89F pmh HFrEF(35%), Afib on eliquis, CRT-D, HTN, hypothyroid coming in after recent bout of URI/pneumonia s/p course oral antibiotics.  Symptoms resumed few days ago and today she developed lower chest discomfort about 2 cm above her lower border of the sternum. Now unable to tolerate PO, everything she eats even liquids hurts her substantially.   ROS: Denies HA, SOB, palpitation, N/V/D, fever, dizziness, recent travel, sick contact, change in bowel or urinary habits   A 10-system ROS was performed and is negative except as noted above and/or in the HPI.  ED: RVP: Flu A+, CT chest b/l GGO, given Ctx, Azithromycin, Tamiflu in ED       Hospital Course:  RVP +influenza A  -Continue Tamiflu (to be completed today)   -S/p 5 day course of abx   -TTE with EF 41%, severe (grade 3) LV diastolic dysfunction, with elevated left ventricular filling pressure.    Weight - Discharge/Trend:  Weight in k.7 (25 @ 05:11)  Weight in k.5 (25 @ 05:10)      Documented EF:   TTE with EF 41%, severe (grade 3) LV diastolic dysfunction, with elevated left ventricular filling pressure.      Guideline Directed Medical Therapy Prescribed/Reason why not prescribed:  B-Blocker: Metoprolol  ARNI/ACE-I/ARB:   MRA:  SGLT2 inhibitor: Jardiance    Appointment scheduled within 7 days?  [X] YES [ ] NO    Active or Pending Issues Requiring Follow-up:  Cardiology Dr. Fink   Pulmonology   PCP    Advanced Directives:   [ x] Full code  [ ] DNR  [ ] Hospice    Discharge Diagnoses:  CHF   HTN  Flu/pneumonia

## 2025-02-11 NOTE — PROGRESS NOTE ADULT - NS ATTEND OPT1 GEN_ALL_CORE
I attest my time as attending is greater than 50% of the total combined time spent on qualifying patient care activities by the PA/NP and attending.
I independently performed the documented:
I attest my time as attending is greater than 50% of the total combined time spent on qualifying patient care activities by the PA/NP and attending.
I independently performed the documented:
I independently performed the documented:
I attest my time as attending is greater than 50% of the total combined time spent on qualifying patient care activities by the PA/NP and attending.

## 2025-02-11 NOTE — PROGRESS NOTE ADULT - SUBJECTIVE AND OBJECTIVE BOX
DATE OF SERVICE: 02-11-25 @ 11:44    Patient is a 89y old  Female who presents with a chief complaint of CP (11 Feb 2025 10:46)      INTERVAL HISTORY: Feels much better and at baseline.     REVIEW OF SYSTEMS:  CONSTITUTIONAL: No weakness  EYES/ENT: No visual changes;  No throat pain   NECK: No pain or stiffness  RESPIRATORY: No cough, wheezing; No shortness of breath  CARDIOVASCULAR: No chest pain or palpitations  GASTROINTESTINAL: No abdominal  pain. No nausea, vomiting, or hematemesis  GENITOURINARY: No dysuria, frequency or hematuria  NEUROLOGICAL: No stroke like symptoms  SKIN: No rashes    	  MEDICATIONS:  aMIOdarone    Tablet 100 milliGRAM(s) Oral daily  furosemide   Injectable 40 milliGRAM(s) IV Push daily  metoprolol succinate ER 12.5 milliGRAM(s) Oral daily  spironolactone 12.5 milliGRAM(s) Oral daily        PHYSICAL EXAM:  T(C): 36.3 (02-11-25 @ 04:37), Max: 36.7 (02-10-25 @ 20:50)  HR: 68 (02-11-25 @ 04:37) (68 - 68)  BP: 111/71 (02-11-25 @ 04:37) (104/69 - 111/71)  RR: 18 (02-11-25 @ 04:37) (18 - 18)  SpO2: 96% (02-11-25 @ 04:37) (93% - 96%)  Wt(kg): --  I&O's Summary    10 Feb 2025 07:01  -  11 Feb 2025 07:00  --------------------------------------------------------  IN: 720 mL / OUT: 0 mL / NET: 720 mL          Appearance: In no distress	  HEENT:    PERRL, EOMI	  Cardiovascular:  S1 S2, No JVD  Respiratory: Lungs clear to auscultation	  Gastrointestinal:  Soft, Non-tender, + BS	  Vascularature:  No edema of LE  Psychiatric: Appropriate affect   Neuro: no acute focal deficits           02-11    137  |  99  |  32[H]  ----------------------------<  95  4.5   |  25  |  1.17    Ca    9.5      11 Feb 2025 07:19  Mg     2.5     02-11          Labs personally reviewed      ASSESSMENT/PLAN: 	      89F pmh HFrEF(35%), Afib on eliquis, CRT-D, HTN, hypothyroid coming in after recent bout of URI/pneumonia s/p course oral antibiotics.  Symptoms resumed few days ago and today she developed lower chest discomfort about 2 cm above her lower border of the sternum. Now unable to tolerate PO, everything she eats even liquids hurts her substantially.     Problem/Plan #1: Chest Pain  - Will review ECG although given she has CRT may not be diagnostic for ischemia  - Trop 45-->47 similar to prior admission    Problem/Plan #2: Acute Systolic Heart Failure  - s/p CRT  - CT shows B/L GGOs, septal thickening, B/L patchy consolidation, thickening of distal esophagus  - TTE 2/6 - EF 41%, WMA (stable compared to 2023), severe grade 3 DD, significant elevated filling pressure, mod-severe MR  - c/w sHF GDMT:  --- Toprol 12.5mg PO daily  --- Spironolactone 12.5mg PO daily  --- Resume Jardiance (home dose)    --- Soft BP precludes further uptitration       - Now appears euvolemic. Transition to Lasix 0mg PO daily (QOD at home prior to admission).     - Plan for close OP follow up with Dr. Prince Fink.      Problem/Plan #3: Atrial Fibrillation  - c/w amiodarone 100mg PO daily  - c/w Toprol 12.5mg PO daily  - c/w Eliquis 2.5mg PO BID           Dennise Ariza, AG-NP   Jese Salgado DO [facility]  Cardiovascular Medicine  [address], [address]  Available through call or text on [employer]  Office: [phone]   DATE OF SERVICE: 02-11-25 @ 11:44    Patient is a 89y old  Female who presents with a chief complaint of CP (11 Feb 2025 10:46)      INTERVAL HISTORY: Feels much better and at baseline.     REVIEW OF SYSTEMS:  CONSTITUTIONAL: No weakness  EYES/ENT: No visual changes;  No throat pain   NECK: No pain or stiffness  RESPIRATORY: No cough, wheezing; No shortness of breath  CARDIOVASCULAR: No chest pain or palpitations  GASTROINTESTINAL: No abdominal  pain. No nausea, vomiting, or hematemesis  GENITOURINARY: No dysuria, frequency or hematuria  NEUROLOGICAL: No stroke like symptoms  SKIN: No rashes    	  MEDICATIONS:  aMIOdarone    Tablet 100 milliGRAM(s) Oral daily  furosemide   Injectable 40 milliGRAM(s) IV Push daily  metoprolol succinate ER 12.5 milliGRAM(s) Oral daily  spironolactone 12.5 milliGRAM(s) Oral daily        PHYSICAL EXAM:  T(C): 36.3 (02-11-25 @ 04:37), Max: 36.7 (02-10-25 @ 20:50)  HR: 68 (02-11-25 @ 04:37) (68 - 68)  BP: 111/71 (02-11-25 @ 04:37) (104/69 - 111/71)  RR: 18 (02-11-25 @ 04:37) (18 - 18)  SpO2: 96% (02-11-25 @ 04:37) (93% - 96%)  Wt(kg): --  I&O's Summary    10 Feb 2025 07:01  -  11 Feb 2025 07:00  --------------------------------------------------------  IN: 720 mL / OUT: 0 mL / NET: 720 mL          Appearance: In no distress	  HEENT:    PERRL, EOMI	  Cardiovascular:  S1 S2, No JVD  Respiratory: Lungs clear to auscultation	  Gastrointestinal:  Soft, Non-tender, + BS	  Vascularature:  No edema of LE  Psychiatric: Appropriate affect   Neuro: no acute focal deficits           02-11    137  |  99  |  32[H]  ----------------------------<  95  4.5   |  25  |  1.17    Ca    9.5      11 Feb 2025 07:19  Mg     2.5     02-11          Labs personally reviewed      ASSESSMENT/PLAN: 	      89F pmh HFrEF(35%), Afib on eliquis, CRT-D, HTN, hypothyroid coming in after recent bout of URI/pneumonia s/p course oral antibiotics.  Symptoms resumed few days ago and today she developed lower chest discomfort about 2 cm above her lower border of the sternum. Now unable to tolerate PO, everything she eats even liquids hurts her substantially.     Problem/Plan #1: Chest Pain  - Will review ECG although given she has CRT may not be diagnostic for ischemia  - Trop 45-->47 similar to prior admission    Problem/Plan #2: Acute Systolic Heart Failure  - s/p CRT  - CT shows B/L GGOs, septal thickening, B/L patchy consolidation, thickening of distal esophagus  - TTE 2/6 - EF 41%, WMA (stable compared to 2023), severe grade 3 DD, significant elevated filling pressure, mod-severe MR  - c/w sHF GDMT:  --- Toprol 12.5mg PO daily  --- Spironolactone 12.5mg PO daily  --- Resume Jardiance (home dose)    --- Soft BP precludes further uptitration       - Now appears euvolemic. Transition to Lasix 20mg PO daily (QOD at home prior to admission).     - Plan for close OP follow up with Dr. Prince Fink.      Problem/Plan #3: Atrial Fibrillation  - c/w amiodarone 100mg PO daily  - c/w Toprol 12.5mg PO daily  - c/w Eliquis 2.5mg PO BID           Dennise Ariza, AG-NP   Jese Salgado DO [facility]  Cardiovascular Medicine  [address], [address]  Available through call or text on [employer]  Office: [phone]

## 2025-02-11 NOTE — PROGRESS NOTE ADULT - PROVIDER SPECIALTY LIST ADULT
Cardiology
Internal Medicine
Internal Medicine
Cardiology
Cardiology
Internal Medicine
Internal Medicine
Pulmonology
Pulmonology
Internal Medicine
Internal Medicine
Pulmonology

## 2025-02-11 NOTE — PROGRESS NOTE ADULT - SUBJECTIVE AND OBJECTIVE BOX
Follow-up Pulm Progress Note    No new respiratory events overnight.  Denies SOB/CP.   cough continues to improve     Medications:  MEDICATIONS  (STANDING):  albuterol/ipratropium for Nebulization 3 milliLiter(s) Nebulizer every 6 hours  aMIOdarone    Tablet 100 milliGRAM(s) Oral daily  apixaban 2.5 milliGRAM(s) Oral every 12 hours  dapagliflozin 10 milliGRAM(s) Oral daily  furosemide   Injectable 40 milliGRAM(s) IV Push daily  guaiFENesin Oral Liquid (Sugar-Free) 100 milliGRAM(s) Oral every 6 hours  levothyroxine 112 MICROGram(s) Oral daily  metoprolol succinate ER 12.5 milliGRAM(s) Oral daily  pantoprazole    Tablet 40 milliGRAM(s) Oral before breakfast  sodium chloride 0.9%. 1000 milliLiter(s) (50 mL/Hr) IV Continuous <Continuous>  spironolactone 12.5 milliGRAM(s) Oral daily  sucralfate 1 Gram(s) Oral two times a day    MEDICATIONS  (PRN):  acetaminophen     Tablet .. 650 milliGRAM(s) Oral every 6 hours PRN Temp greater or equal to 38C (100.4F), Mild Pain (1 - 3)  melatonin 3 milliGRAM(s) Oral at bedtime PRN Insomnia  ondansetron Injectable 4 milliGRAM(s) IV Push every 8 hours PRN Nausea and/or Vomiting          Vital Signs Last 24 Hrs  T(C): 36.3 (11 Feb 2025 04:37), Max: 37 (10 Feb 2025 10:58)  T(F): 97.4 (11 Feb 2025 04:37), Max: 98.6 (10 Feb 2025 10:58)  HR: 68 (11 Feb 2025 04:37) (68 - 76)  BP: 111/71 (11 Feb 2025 04:37) (98/63 - 111/71)  BP(mean): --  RR: 18 (11 Feb 2025 04:37) (18 - 18)  SpO2: 96% (11 Feb 2025 04:37) (93% - 97%)    Parameters below as of 11 Feb 2025 04:37  Patient On (Oxygen Delivery Method): room air              02-10 @ 07:01  -  02-11 @ 07:00  --------------------------------------------------------  IN: 720 mL / OUT: 0 mL / NET: 720 mL          LABS:    02-11    137  |  99  |  32[H]  ----------------------------<  95  4.5   |  25  |  1.17    Ca    9.5      11 Feb 2025 07:19  Mg     2.5     02-11            CAPILLARY BLOOD GLUCOSE          Urinalysis Basic - ( 11 Feb 2025 07:19 )    Color: x / Appearance: x / SG: x / pH: x  Gluc: 95 mg/dL / Ketone: x  / Bili: x / Urobili: x   Blood: x / Protein: x / Nitrite: x   Leuk Esterase: x / RBC: x / WBC x   Sq Epi: x / Non Sq Epi: x / Bacteria: x                      CULTURES:     Culture - Blood (collected 02-05-25 @ 20:00)  Source: .Blood BLOOD  Final Report (02-11-25 @ 01:00):    No growth at 5 days    Culture - Blood (collected 02-05-25 @ 19:45)  Source: .Blood BLOOD  Final Report (02-11-25 @ 01:00):    No growth at 5 days                    Physical Examination:  PULM: grossly clear   CVS: S1, S2 heard    RADIOLOGY REVIEWED    CT chest:  < from: CT Chest No Cont (02.05.25 @ 18:11) >    FINDINGS:    Tubes/Lines: None.    Lungs, airways and pleura: Bilateral septal thickening and groundglass   opacities. There are patchy foci consolidation, some which are branching   in the bilateral lower lobes. The largest opacities in the left lower   lobe measures 1.2 x 0.7 cm (series 4 image 120).    There are secretions in the posterior segmental bronchi of the right   lower lobe. There are secretions in the trachea and right mainstem   bronchus. No pneumothorax. Scant bilateral pleural effusions..    Mediastinum: The distal esophagus is thickened.    The thyroid gland is not visualized. The chest lymph nodes measure less   than 10 mm in the short axis.    Left-sided aortic arch and left-sided descending thoracic aorta. Aortic   calcifications. The heart is enlarged. Biventricular AICD.    Upper Abdomen: The upper abdomen is unremarkable.    Bones And Soft Tissues: Spondylosis of the thoracic spine.  The soft   tissues are unremarkable.      IMPRESSION:    1.  Bilateral groundglass opacities and septal thickening are of   uncertain etiology.  2.  Bilateral patchy consolidation, some which are branching in the lower   lobes are of uncertain etiology.  3.  The distal esophagus is thickened.  4.  CT chest without contrast in 8 weeks is recommended to evaluate for   resolution/change.    --- End of Report ---    < end of copied text >

## 2025-02-11 NOTE — PROGRESS NOTE ADULT - PROBLEM SELECTOR PLAN 2
CT chest with b/l GGO and septal thickening and b/l patchy consolidations  -S/p 5 day course of abx   -Continue Duoneb q6h, d/c nebs on discharge   -Continue Robitussin q6h, change to q6h PRN on discharge   -Hypoxia resolved, keep sats >90% with o2 PRN
better , denies any SOB at present   c/w lasix   c/w meds   pul following
-  WBC 6.53, Procal 0.16  - RVP: Influenza A+  - CXR: no acute cardiopulmonary disease  - CT chest: b/L GGO & patchy consolidation    - Tamiflu 75mg BID x 5d   - Given persistence of sx in this high risk  - Patient is now s/p 5 day course of ABX   - F/u Bcx  neg   - Supportive care   - Supplemental O2 prn, now on RA   - Precaution per unit protocol (Droplet precx)  - Pulm eval appreciated; F/u recs
CT chest with b/l GGO and septal thickening and b/l patchy consolidations  -S/p 5 day course of abx   -Continue Duoneb q6h, d/c nebs on discharge   -Continue Robitussin q6h, change to q6h PRN on discharge   -Hypoxia resolved, keep sats >90% with o2 PRN  -D/c planning from pulm perspective, f/u in office 2 weeks
CT chest with b/l GGO and septal thickening and b/l patchy consolidations  -Continue Ceftriaxone/azithro   -Continue Duoneb q6h  -Continue Robitussin q6h  -Hypoxia improving. Keep sats >90% with o2 PRN (currently RA).
-  WBC 6.53, Procal 0.16  - RVP: Influenza A+  - CXR: no acute cardiopulmonary disease  - CT chest: b/L GGO & patchy consolidation    - Tamiflu 75mg BID x 5d   - Given persistence of sx in this high risk  - Patient is now s/p 5 day course of ABX   - F/u Bcx  neg   - Supportive care   - Supplemental O2 prn, now on RA   - Precaution per unit protocol (Droplet precx)  - Pulm eval appreciated; F/u recs
-  WBC 6.53, Procal 0.16  - RVP: Influenza A+  - CXR: no acute cardiopulmonary disease  - CT chest: b/L GGO & patchy consolidation    - Tamiflu 75mg BID x 5d (2/5  - ??)   - Given persistence of sx in this high risk pt C/w IV Ceftriaxone and Azithromycin for possible supper imposed CAP  - Check: Ur Legionella, Ur Mycoplasma, S. Pneumo, H. flu  - F/u Bcx   - Supportive care   - Supplemental O2 prn  - Precaution per unit protocol (Droplet precx)  - pulm eval called

## 2025-02-11 NOTE — PROGRESS NOTE ADULT - SUBJECTIVE AND OBJECTIVE BOX
Name of Patient : ANISA LUCIANO  MRN: [id number]  Date of visit: 02-11-25       Subjective: Patient seen and examined. No new events except as noted.   doing okay       REVIEW OF SYSTEMS:    CONSTITUTIONAL: No weakness, fevers or chills  EYES/ENT: No visual changes;  No vertigo or throat pain   NECK: No pain or stiffness  RESPIRATORY: No cough, wheezing, hemoptysis; No shortness of breath  CARDIOVASCULAR: No chest pain or palpitations  GASTROINTESTINAL: No abdominal or epigastric pain. No nausea, vomiting, or hematemesis; No diarrhea or constipation. No melena or hematochezia.  GENITOURINARY: No dysuria, frequency or hematuria  NEUROLOGICAL: No numbness or weakness  SKIN: No itching, burning, rashes, or lesions   All other review of systems is negative unless indicated above.    MEDICATIONS:  MEDICATIONS  (STANDING):  albuterol/ipratropium for Nebulization 3 milliLiter(s) Nebulizer every 6 hours  aMIOdarone    Tablet 100 milliGRAM(s) Oral daily  apixaban 2.5 milliGRAM(s) Oral every 12 hours  dapagliflozin 10 milliGRAM(s) Oral daily  furosemide   Injectable 40 milliGRAM(s) IV Push daily  guaiFENesin Oral Liquid (Sugar-Free) 100 milliGRAM(s) Oral every 6 hours  levothyroxine 112 MICROGram(s) Oral daily  metoprolol succinate ER 12.5 milliGRAM(s) Oral daily  pantoprazole    Tablet 40 milliGRAM(s) Oral before breakfast  sodium chloride 0.9%. 1000 milliLiter(s) (50 mL/Hr) IV Continuous <Continuous>  spironolactone 12.5 milliGRAM(s) Oral daily  sucralfate 1 Gram(s) Oral two times a day      PHYSICAL EXAM:  T(C): 36.8 (02-11-25 @ 12:52), Max: 36.8 (02-11-25 @ 12:52)  HR: 77 (02-11-25 @ 12:52) (68 - 77)  BP: 109/67 (02-11-25 @ 12:52) (104/69 - 111/71)  RR: 18 (02-11-25 @ 12:52) (18 - 18)  SpO2: 95% (02-11-25 @ 12:52) (93% - 96%)  Wt(kg): --  I&O's Summary    10 Feb 2025 07:01  -  11 Feb 2025 07:00  --------------------------------------------------------  IN: 720 mL / OUT: 0 mL / NET: 720 mL          Appearance: Normal	  HEENT:  PERRLA   Lymphatic: No lymphadenopathy   Cardiovascular: Normal S1 S2, no JVD  Respiratory: normal effort , clear  Gastrointestinal:  Soft, Non-tender  Skin: No rashes,  warm to touch  Psychiatry:  Mood & affect appropriate  Musculuskeletal: No edema    recent labs, Imaging and EKGs personally reviewed     02-10-25 @ 07:01  -  02-11-25 @ 07:00  --------------------------------------------------------  IN: 720 mL / OUT: 0 mL / NET: 720 mL                02-11    137  |  99  |  32[H]  ----------------------------<  95  4.5   |  25  |  1.17    Ca    9.5      11 Feb 2025 07:19  Mg     2.5     02-11                         Urinalysis Basic - ( 11 Feb 2025 07:19 )    Color: x / Appearance: x / SG: x / pH: x  Gluc: 95 mg/dL / Ketone: x  / Bili: x / Urobili: x   Blood: x / Protein: x / Nitrite: x   Leuk Esterase: x / RBC: x / WBC x   Sq Epi: x / Non Sq Epi: x / Bacteria: x
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
## 2025-07-27 ENCOUNTER — INPATIENT (INPATIENT)
Facility: HOSPITAL | Age: 89
LOS: 4 days | Discharge: HOME | DRG: 392 | End: 2025-08-01
Attending: INTERNAL MEDICINE | Admitting: INTERNAL MEDICINE
Payer: MEDICARE

## 2025-07-27 VITALS
WEIGHT: 119.93 LBS | HEIGHT: 60 IN | TEMPERATURE: 99 F | SYSTOLIC BLOOD PRESSURE: 119 MMHG | HEART RATE: 78 BPM | OXYGEN SATURATION: 99 % | DIASTOLIC BLOOD PRESSURE: 77 MMHG | RESPIRATION RATE: 22 BRPM

## 2025-07-27 DIAGNOSIS — D62 ACUTE POSTHEMORRHAGIC ANEMIA: ICD-10-CM

## 2025-07-27 DIAGNOSIS — M32.9 SYSTEMIC LUPUS ERYTHEMATOSUS, UNSPECIFIED: ICD-10-CM

## 2025-07-27 DIAGNOSIS — I10 ESSENTIAL (PRIMARY) HYPERTENSION: ICD-10-CM

## 2025-07-27 DIAGNOSIS — E03.9 HYPOTHYROIDISM, UNSPECIFIED: ICD-10-CM

## 2025-07-27 DIAGNOSIS — H25.019 CORTICAL AGE-RELATED CATARACT, UNSPECIFIED EYE: Chronic | ICD-10-CM

## 2025-07-27 DIAGNOSIS — I50.22 CHRONIC SYSTOLIC (CONGESTIVE) HEART FAILURE: ICD-10-CM

## 2025-07-27 DIAGNOSIS — Z29.9 ENCOUNTER FOR PROPHYLACTIC MEASURES, UNSPECIFIED: ICD-10-CM

## 2025-07-27 DIAGNOSIS — R10.13 EPIGASTRIC PAIN: ICD-10-CM

## 2025-07-27 DIAGNOSIS — I48.0 PAROXYSMAL ATRIAL FIBRILLATION: ICD-10-CM

## 2025-07-27 DIAGNOSIS — R65.10 SYSTEMIC INFLAMMATORY RESPONSE SYNDROME (SIRS) OF NON-INFECTIOUS ORIGIN WITHOUT ACUTE ORGAN DYSFUNCTION: ICD-10-CM

## 2025-07-27 DIAGNOSIS — R93.89 ABNORMAL FINDINGS ON DIAGNOSTIC IMAGING OF OTHER SPECIFIED BODY STRUCTURES: ICD-10-CM

## 2025-07-27 LAB
ALBUMIN SERPL ELPH-MCNC: 4.4 G/DL — SIGNIFICANT CHANGE UP (ref 3.3–5)
ALP SERPL-CCNC: 62 U/L — SIGNIFICANT CHANGE UP (ref 40–120)
ALT FLD-CCNC: 20 U/L — SIGNIFICANT CHANGE UP (ref 10–45)
ANION GAP SERPL CALC-SCNC: 16 MMOL/L — SIGNIFICANT CHANGE UP (ref 5–17)
APPEARANCE UR: CLEAR — SIGNIFICANT CHANGE UP
APTT BLD: 33.8 SEC — SIGNIFICANT CHANGE UP (ref 26.1–36.8)
AST SERPL-CCNC: 23 U/L — SIGNIFICANT CHANGE UP (ref 10–40)
BILIRUB SERPL-MCNC: 0.3 MG/DL — SIGNIFICANT CHANGE UP (ref 0.2–1.2)
BILIRUB UR-MCNC: NEGATIVE — SIGNIFICANT CHANGE UP
BUN SERPL-MCNC: 45 MG/DL — HIGH (ref 7–23)
CALCIUM SERPL-MCNC: 9.1 MG/DL — SIGNIFICANT CHANGE UP (ref 8.4–10.5)
CHLORIDE SERPL-SCNC: 98 MMOL/L — SIGNIFICANT CHANGE UP (ref 96–108)
CO2 SERPL-SCNC: 24 MMOL/L — SIGNIFICANT CHANGE UP (ref 22–31)
COLOR SPEC: YELLOW — SIGNIFICANT CHANGE UP
CREAT SERPL-MCNC: 1.72 MG/DL — HIGH (ref 0.5–1.3)
DIFF PNL FLD: NEGATIVE — SIGNIFICANT CHANGE UP
EGFR: 28 ML/MIN/1.73M2 — LOW
EGFR: 28 ML/MIN/1.73M2 — LOW
FLUAV AG NPH QL: SIGNIFICANT CHANGE UP
FLUBV AG NPH QL: SIGNIFICANT CHANGE UP
GAS PNL BLDV: SIGNIFICANT CHANGE UP
GAS PNL BLDV: SIGNIFICANT CHANGE UP
GLUCOSE SERPL-MCNC: 114 MG/DL — HIGH (ref 70–99)
GLUCOSE UR QL: 500 MG/DL
HCT VFR BLD CALC: 34.8 % — SIGNIFICANT CHANGE UP (ref 34.5–45)
HGB BLD-MCNC: 10 G/DL — LOW (ref 11.5–15.5)
INR BLD: 1.4 RATIO — HIGH (ref 0.85–1.16)
KETONES UR QL: NEGATIVE MG/DL — SIGNIFICANT CHANGE UP
LEUKOCYTE ESTERASE UR-ACNC: NEGATIVE — SIGNIFICANT CHANGE UP
LIDOCAIN IGE QN: 55 U/L — SIGNIFICANT CHANGE UP (ref 7–60)
MCHC RBC-ENTMCNC: 22.8 PG — LOW (ref 27–34)
MCHC RBC-ENTMCNC: 28.7 G/DL — LOW (ref 32–36)
MCV RBC AUTO: 79.5 FL — LOW (ref 80–100)
NITRITE UR-MCNC: NEGATIVE — SIGNIFICANT CHANGE UP
NRBC # BLD AUTO: 0 K/UL — SIGNIFICANT CHANGE UP (ref 0–0)
NRBC # FLD: 0 K/UL — SIGNIFICANT CHANGE UP (ref 0–0)
NRBC BLD AUTO-RTO: 0 /100 WBCS — SIGNIFICANT CHANGE UP (ref 0–0)
NT-PROBNP SERPL-SCNC: 9312 PG/ML — HIGH (ref 0–300)
OB PNL STL: POSITIVE
PH UR: 7.5 — SIGNIFICANT CHANGE UP (ref 5–8)
PLATELET # BLD AUTO: 183 K/UL — SIGNIFICANT CHANGE UP (ref 150–400)
PMV BLD: 11.4 FL — SIGNIFICANT CHANGE UP (ref 7–13)
POTASSIUM SERPL-MCNC: 4.2 MMOL/L — SIGNIFICANT CHANGE UP (ref 3.5–5.3)
POTASSIUM SERPL-SCNC: 4.2 MMOL/L — SIGNIFICANT CHANGE UP (ref 3.5–5.3)
PROT SERPL-MCNC: 7.8 G/DL — SIGNIFICANT CHANGE UP (ref 6–8.3)
PROT UR-MCNC: NEGATIVE MG/DL — SIGNIFICANT CHANGE UP
PROTHROM AB SERPL-ACNC: 16 SEC — HIGH (ref 9.9–13.4)
RBC # BLD: 4.38 M/UL — SIGNIFICANT CHANGE UP (ref 3.8–5.2)
RBC # FLD: 19.7 % — HIGH (ref 10.3–14.5)
RSV RNA NPH QL NAA+NON-PROBE: SIGNIFICANT CHANGE UP
SARS-COV-2 RNA SPEC QL NAA+PROBE: SIGNIFICANT CHANGE UP
SODIUM SERPL-SCNC: 138 MMOL/L — SIGNIFICANT CHANGE UP (ref 135–145)
SOURCE RESPIRATORY: SIGNIFICANT CHANGE UP
SP GR SPEC: 1.01 — SIGNIFICANT CHANGE UP (ref 1–1.03)
TROPONIN T, HIGH SENSITIVITY RESULT: 47 NG/L — SIGNIFICANT CHANGE UP (ref 0–51)
UROBILINOGEN FLD QL: 0.2 MG/DL — SIGNIFICANT CHANGE UP (ref 0.2–1)
WBC # BLD: 11.5 K/UL — HIGH (ref 3.8–10.5)
WBC # FLD AUTO: 11.5 K/UL — HIGH (ref 3.8–10.5)

## 2025-07-27 PROCEDURE — 85018 HEMOGLOBIN: CPT

## 2025-07-27 PROCEDURE — 82272 OCCULT BLD FECES 1-3 TESTS: CPT

## 2025-07-27 PROCEDURE — 83605 ASSAY OF LACTIC ACID: CPT

## 2025-07-27 PROCEDURE — 83690 ASSAY OF LIPASE: CPT

## 2025-07-27 PROCEDURE — 84295 ASSAY OF SERUM SODIUM: CPT

## 2025-07-27 PROCEDURE — 85027 COMPLETE CBC AUTOMATED: CPT

## 2025-07-27 PROCEDURE — 85014 HEMATOCRIT: CPT

## 2025-07-27 PROCEDURE — 99223 1ST HOSP IP/OBS HIGH 75: CPT

## 2025-07-27 PROCEDURE — 83880 ASSAY OF NATRIURETIC PEPTIDE: CPT

## 2025-07-27 PROCEDURE — 85610 PROTHROMBIN TIME: CPT

## 2025-07-27 PROCEDURE — 74177 CT ABD & PELVIS W/CONTRAST: CPT | Mod: 26

## 2025-07-27 PROCEDURE — 87040 BLOOD CULTURE FOR BACTERIA: CPT

## 2025-07-27 PROCEDURE — 84132 ASSAY OF SERUM POTASSIUM: CPT

## 2025-07-27 PROCEDURE — 82803 BLOOD GASES ANY COMBINATION: CPT

## 2025-07-27 PROCEDURE — 74177 CT ABD & PELVIS W/CONTRAST: CPT

## 2025-07-27 PROCEDURE — 93010 ELECTROCARDIOGRAM REPORT: CPT

## 2025-07-27 PROCEDURE — 85730 THROMBOPLASTIN TIME PARTIAL: CPT

## 2025-07-27 PROCEDURE — 81003 URINALYSIS AUTO W/O SCOPE: CPT

## 2025-07-27 PROCEDURE — 82435 ASSAY OF BLOOD CHLORIDE: CPT

## 2025-07-27 PROCEDURE — 71045 X-RAY EXAM CHEST 1 VIEW: CPT

## 2025-07-27 PROCEDURE — 80053 COMPREHEN METABOLIC PANEL: CPT

## 2025-07-27 PROCEDURE — 82947 ASSAY GLUCOSE BLOOD QUANT: CPT

## 2025-07-27 PROCEDURE — 82330 ASSAY OF CALCIUM: CPT

## 2025-07-27 PROCEDURE — 84484 ASSAY OF TROPONIN QUANT: CPT

## 2025-07-27 PROCEDURE — 36415 COLL VENOUS BLD VENIPUNCTURE: CPT

## 2025-07-27 PROCEDURE — 71045 X-RAY EXAM CHEST 1 VIEW: CPT | Mod: 26

## 2025-07-27 PROCEDURE — 99285 EMERGENCY DEPT VISIT HI MDM: CPT

## 2025-07-27 PROCEDURE — 87637 SARSCOV2&INF A&B&RSV AMP PRB: CPT

## 2025-07-27 RX ORDER — LEVOTHYROXINE SODIUM 300 MCG
1 TABLET ORAL
Refills: 0 | DISCHARGE

## 2025-07-27 RX ORDER — ACETAMINOPHEN 500 MG/5ML
650 LIQUID (ML) ORAL EVERY 6 HOURS
Refills: 0 | Status: DISCONTINUED | OUTPATIENT
Start: 2025-07-27 | End: 2025-08-01

## 2025-07-27 RX ORDER — ACETAMINOPHEN 500 MG/5ML
1000 LIQUID (ML) ORAL ONCE
Refills: 0 | Status: COMPLETED | OUTPATIENT
Start: 2025-07-27 | End: 2025-07-27

## 2025-07-27 RX ORDER — METRONIDAZOLE 250 MG
500 TABLET ORAL ONCE
Refills: 0 | Status: COMPLETED | OUTPATIENT
Start: 2025-07-27 | End: 2025-07-27

## 2025-07-27 RX ORDER — CIPROFLOXACIN HCL 250 MG
400 TABLET ORAL ONCE
Refills: 0 | Status: COMPLETED | OUTPATIENT
Start: 2025-07-27 | End: 2025-07-27

## 2025-07-27 RX ORDER — AMIODARONE HYDROCHLORIDE 50 MG/ML
100 INJECTION, SOLUTION INTRAVENOUS DAILY
Refills: 0 | Status: DISCONTINUED | OUTPATIENT
Start: 2025-07-27 | End: 2025-08-01

## 2025-07-27 RX ORDER — LORATADINE 5 MG/5ML
1 SOLUTION ORAL
Refills: 0 | DISCHARGE

## 2025-07-27 RX ORDER — MIDODRINE HYDROCHLORIDE 5 MG/1
10 TABLET ORAL ONCE
Refills: 0 | Status: COMPLETED | OUTPATIENT
Start: 2025-07-27 | End: 2025-07-27

## 2025-07-27 RX ORDER — MIDODRINE HYDROCHLORIDE 5 MG/1
5 TABLET ORAL THREE TIMES A DAY
Refills: 0 | Status: DISCONTINUED | OUTPATIENT
Start: 2025-07-27 | End: 2025-08-01

## 2025-07-27 RX ORDER — LEVOTHYROXINE SODIUM 300 MCG
125 TABLET ORAL DAILY
Refills: 0 | Status: DISCONTINUED | OUTPATIENT
Start: 2025-07-27 | End: 2025-08-01

## 2025-07-27 RX ORDER — CYST/ALA/Q10/PHOS.SER/DHA/BROC 100-20-50
1 POWDER (GRAM) ORAL
Refills: 0 | DISCHARGE

## 2025-07-27 RX ADMIN — Medication 400 MILLIGRAM(S): at 16:19

## 2025-07-27 RX ADMIN — Medication 100 MILLIGRAM(S): at 15:04

## 2025-07-27 RX ADMIN — Medication 1000 MILLILITER(S): at 15:05

## 2025-07-27 RX ADMIN — Medication 40 MILLIGRAM(S): at 17:16

## 2025-07-27 RX ADMIN — Medication 400 MILLIGRAM(S): at 12:51

## 2025-07-27 RX ADMIN — Medication 650 MILLIGRAM(S): at 23:37

## 2025-07-27 RX ADMIN — Medication 200 MILLIGRAM(S): at 13:45

## 2025-07-27 RX ADMIN — Medication 500 MILLILITER(S): at 13:44

## 2025-07-27 RX ADMIN — MIDODRINE HYDROCHLORIDE 10 MILLIGRAM(S): 5 TABLET ORAL at 15:03

## 2025-07-27 RX ADMIN — MIDODRINE HYDROCHLORIDE 5 MILLIGRAM(S): 5 TABLET ORAL at 23:38

## 2025-07-28 LAB
ANION GAP SERPL CALC-SCNC: 15 MMOL/L — SIGNIFICANT CHANGE UP (ref 5–17)
BASOPHILS # BLD AUTO: 0.04 K/UL — SIGNIFICANT CHANGE UP (ref 0–0.2)
BASOPHILS # BLD AUTO: 0.05 K/UL — SIGNIFICANT CHANGE UP (ref 0–0.2)
BASOPHILS NFR BLD AUTO: 0.7 % — SIGNIFICANT CHANGE UP (ref 0–2)
BASOPHILS NFR BLD AUTO: 0.8 % — SIGNIFICANT CHANGE UP (ref 0–2)
BLD GP AB SCN SERPL QL: NEGATIVE — SIGNIFICANT CHANGE UP
BUN SERPL-MCNC: 37 MG/DL — HIGH (ref 7–23)
CALCIUM SERPL-MCNC: 9.1 MG/DL — SIGNIFICANT CHANGE UP (ref 8.4–10.5)
CHLORIDE SERPL-SCNC: 99 MMOL/L — SIGNIFICANT CHANGE UP (ref 96–108)
CO2 SERPL-SCNC: 25 MMOL/L — SIGNIFICANT CHANGE UP (ref 22–31)
CREAT ?TM UR-MCNC: 64 MG/DL — SIGNIFICANT CHANGE UP
CREAT SERPL-MCNC: 1.75 MG/DL — HIGH (ref 0.5–1.3)
EGFR: 28 ML/MIN/1.73M2 — LOW
EGFR: 28 ML/MIN/1.73M2 — LOW
EOSINOPHIL # BLD AUTO: 0.07 K/UL — SIGNIFICANT CHANGE UP (ref 0–0.5)
EOSINOPHIL # BLD AUTO: 0.11 K/UL — SIGNIFICANT CHANGE UP (ref 0–0.5)
EOSINOPHIL NFR BLD AUTO: 1.3 % — SIGNIFICANT CHANGE UP (ref 0–6)
EOSINOPHIL NFR BLD AUTO: 1.7 % — SIGNIFICANT CHANGE UP (ref 0–6)
FERRITIN SERPL-MCNC: 31 NG/ML — SIGNIFICANT CHANGE UP (ref 13–330)
GLUCOSE SERPL-MCNC: 79 MG/DL — SIGNIFICANT CHANGE UP (ref 70–99)
HCT VFR BLD CALC: 29.3 % — LOW (ref 34.5–45)
HCT VFR BLD CALC: 30.8 % — LOW (ref 34.5–45)
HCT VFR BLD CALC: 32.4 % — LOW (ref 34.5–45)
HGB BLD-MCNC: 8.9 G/DL — LOW (ref 11.5–15.5)
HGB BLD-MCNC: 9.4 G/DL — LOW (ref 11.5–15.5)
HGB BLD-MCNC: 9.7 G/DL — LOW (ref 11.5–15.5)
IMM GRANULOCYTES # BLD AUTO: 0.01 K/UL — SIGNIFICANT CHANGE UP (ref 0–0.07)
IMM GRANULOCYTES # BLD AUTO: 0.02 K/UL — SIGNIFICANT CHANGE UP (ref 0–0.07)
IMM GRANULOCYTES NFR BLD AUTO: 0.2 % — SIGNIFICANT CHANGE UP (ref 0–0.9)
IMM GRANULOCYTES NFR BLD AUTO: 0.3 % — SIGNIFICANT CHANGE UP (ref 0–0.9)
IMMATURE RETICULOCYTE FRACTION %: 18 % — SIGNIFICANT CHANGE UP
INR BLD: 1.27 RATIO — HIGH (ref 0.85–1.16)
IRON SATN MFR SERPL: 25 UG/DL — LOW (ref 30–160)
IRON SATN MFR SERPL: 8 % — LOW (ref 14–50)
LYMPHOCYTES # BLD AUTO: 0.56 K/UL — LOW (ref 1–3.3)
LYMPHOCYTES # BLD AUTO: 0.68 K/UL — LOW (ref 1–3.3)
LYMPHOCYTES NFR BLD AUTO: 12.7 % — LOW (ref 13–44)
LYMPHOCYTES NFR BLD AUTO: 8.5 % — LOW (ref 13–44)
MAGNESIUM SERPL-MCNC: 3.2 MG/DL — HIGH (ref 1.6–2.6)
MCHC RBC-ENTMCNC: 23.7 PG — LOW (ref 27–34)
MCHC RBC-ENTMCNC: 23.7 PG — LOW (ref 27–34)
MCHC RBC-ENTMCNC: 23.9 PG — LOW (ref 27–34)
MCHC RBC-ENTMCNC: 29.9 G/DL — LOW (ref 32–36)
MCHC RBC-ENTMCNC: 30.4 G/DL — LOW (ref 32–36)
MCHC RBC-ENTMCNC: 30.5 G/DL — LOW (ref 32–36)
MCV RBC AUTO: 78.1 FL — LOW (ref 80–100)
MCV RBC AUTO: 78.2 FL — LOW (ref 80–100)
MCV RBC AUTO: 79.2 FL — LOW (ref 80–100)
MONOCYTES # BLD AUTO: 0.65 K/UL — SIGNIFICANT CHANGE UP (ref 0–0.9)
MONOCYTES # BLD AUTO: 0.75 K/UL — SIGNIFICANT CHANGE UP (ref 0–0.9)
MONOCYTES NFR BLD AUTO: 14 % — SIGNIFICANT CHANGE UP (ref 2–14)
MONOCYTES NFR BLD AUTO: 9.9 % — SIGNIFICANT CHANGE UP (ref 2–14)
NEUTROPHILS # BLD AUTO: 3.81 K/UL — SIGNIFICANT CHANGE UP (ref 1.8–7.4)
NEUTROPHILS # BLD AUTO: 5.17 K/UL — SIGNIFICANT CHANGE UP (ref 1.8–7.4)
NEUTROPHILS NFR BLD AUTO: 71.1 % — SIGNIFICANT CHANGE UP (ref 43–77)
NEUTROPHILS NFR BLD AUTO: 78.8 % — HIGH (ref 43–77)
NRBC # BLD AUTO: 0 K/UL — SIGNIFICANT CHANGE UP (ref 0–0)
NRBC # FLD: 0 K/UL — SIGNIFICANT CHANGE UP (ref 0–0)
NRBC BLD AUTO-RTO: 0 /100 WBCS — SIGNIFICANT CHANGE UP (ref 0–0)
PHOSPHATE SERPL-MCNC: 4.3 MG/DL — SIGNIFICANT CHANGE UP (ref 2.5–4.5)
PLATELET # BLD AUTO: 177 K/UL — SIGNIFICANT CHANGE UP (ref 150–400)
PLATELET # BLD AUTO: 191 K/UL — SIGNIFICANT CHANGE UP (ref 150–400)
PLATELET # BLD AUTO: 195 K/UL — SIGNIFICANT CHANGE UP (ref 150–400)
PMV BLD: 10.9 FL — SIGNIFICANT CHANGE UP (ref 7–13)
PMV BLD: 11 FL — SIGNIFICANT CHANGE UP (ref 7–13)
PMV BLD: 11.2 FL — SIGNIFICANT CHANGE UP (ref 7–13)
POTASSIUM SERPL-MCNC: 3.9 MMOL/L — SIGNIFICANT CHANGE UP (ref 3.5–5.3)
POTASSIUM SERPL-SCNC: 3.9 MMOL/L — SIGNIFICANT CHANGE UP (ref 3.5–5.3)
PROTHROM AB SERPL-ACNC: 14.6 SEC — HIGH (ref 9.9–13.4)
RBC # BLD: 3.75 M/UL — LOW (ref 3.8–5.2)
RBC # BLD: 3.94 M/UL — SIGNIFICANT CHANGE UP (ref 3.8–5.2)
RBC # BLD: 4 M/UL — SIGNIFICANT CHANGE UP (ref 3.8–5.2)
RBC # BLD: 4.09 M/UL — SIGNIFICANT CHANGE UP (ref 3.8–5.2)
RBC # FLD: 19.6 % — HIGH (ref 10.3–14.5)
RBC # FLD: 19.6 % — HIGH (ref 10.3–14.5)
RBC # FLD: 19.9 % — HIGH (ref 10.3–14.5)
RETICS #: 58 K/UL — SIGNIFICANT CHANGE UP (ref 25–125)
RETICS/RBC NFR: 1.5 % — SIGNIFICANT CHANGE UP (ref 0.5–2.5)
RETICULOCYTE HEMOGLOBIN EQUIVALENT: 24.2 PG — LOW (ref 30.6–40.7)
RH IG SCN BLD-IMP: POSITIVE — SIGNIFICANT CHANGE UP
SODIUM SERPL-SCNC: 139 MMOL/L — SIGNIFICANT CHANGE UP (ref 135–145)
SODIUM UR-SCNC: 30 MMOL/L — SIGNIFICANT CHANGE UP
TIBC SERPL-MCNC: 332 UG/DL — SIGNIFICANT CHANGE UP (ref 220–430)
TROPONIN T, HIGH SENSITIVITY RESULT: 66 NG/L — HIGH (ref 0–51)
TROPONIN T, HIGH SENSITIVITY RESULT: 67 NG/L — HIGH (ref 0–51)
UIBC SERPL-MCNC: 307 UG/DL — SIGNIFICANT CHANGE UP (ref 110–370)
WBC # BLD: 5.36 K/UL — SIGNIFICANT CHANGE UP (ref 3.8–10.5)
WBC # BLD: 6.56 K/UL — SIGNIFICANT CHANGE UP (ref 3.8–10.5)
WBC # BLD: 7.97 K/UL — SIGNIFICANT CHANGE UP (ref 3.8–10.5)
WBC # FLD AUTO: 5.36 K/UL — SIGNIFICANT CHANGE UP (ref 3.8–10.5)
WBC # FLD AUTO: 6.56 K/UL — SIGNIFICANT CHANGE UP (ref 3.8–10.5)
WBC # FLD AUTO: 7.97 K/UL — SIGNIFICANT CHANGE UP (ref 3.8–10.5)

## 2025-07-28 PROCEDURE — 84100 ASSAY OF PHOSPHORUS: CPT

## 2025-07-28 PROCEDURE — 85014 HEMATOCRIT: CPT

## 2025-07-28 PROCEDURE — 82947 ASSAY GLUCOSE BLOOD QUANT: CPT

## 2025-07-28 PROCEDURE — 84295 ASSAY OF SERUM SODIUM: CPT

## 2025-07-28 PROCEDURE — 80053 COMPREHEN METABOLIC PANEL: CPT

## 2025-07-28 PROCEDURE — 81003 URINALYSIS AUTO W/O SCOPE: CPT

## 2025-07-28 PROCEDURE — 82728 ASSAY OF FERRITIN: CPT

## 2025-07-28 PROCEDURE — 74177 CT ABD & PELVIS W/CONTRAST: CPT

## 2025-07-28 PROCEDURE — 82330 ASSAY OF CALCIUM: CPT

## 2025-07-28 PROCEDURE — 36415 COLL VENOUS BLD VENIPUNCTURE: CPT

## 2025-07-28 PROCEDURE — 71045 X-RAY EXAM CHEST 1 VIEW: CPT

## 2025-07-28 PROCEDURE — 99223 1ST HOSP IP/OBS HIGH 75: CPT | Mod: GC

## 2025-07-28 PROCEDURE — 82272 OCCULT BLD FECES 1-3 TESTS: CPT

## 2025-07-28 PROCEDURE — 83690 ASSAY OF LIPASE: CPT

## 2025-07-28 PROCEDURE — 85045 AUTOMATED RETICULOCYTE COUNT: CPT

## 2025-07-28 PROCEDURE — 87637 SARSCOV2&INF A&B&RSV AMP PRB: CPT

## 2025-07-28 PROCEDURE — 82435 ASSAY OF BLOOD CHLORIDE: CPT

## 2025-07-28 PROCEDURE — 84132 ASSAY OF SERUM POTASSIUM: CPT

## 2025-07-28 PROCEDURE — 86901 BLOOD TYPING SEROLOGIC RH(D): CPT

## 2025-07-28 PROCEDURE — 86900 BLOOD TYPING SEROLOGIC ABO: CPT

## 2025-07-28 PROCEDURE — 85730 THROMBOPLASTIN TIME PARTIAL: CPT

## 2025-07-28 PROCEDURE — 82803 BLOOD GASES ANY COMBINATION: CPT

## 2025-07-28 PROCEDURE — 80048 BASIC METABOLIC PNL TOTAL CA: CPT

## 2025-07-28 PROCEDURE — 83540 ASSAY OF IRON: CPT

## 2025-07-28 PROCEDURE — 84540 ASSAY OF URINE/UREA-N: CPT

## 2025-07-28 PROCEDURE — 85018 HEMOGLOBIN: CPT

## 2025-07-28 PROCEDURE — 85025 COMPLETE CBC W/AUTO DIFF WBC: CPT

## 2025-07-28 PROCEDURE — 93005 ELECTROCARDIOGRAM TRACING: CPT

## 2025-07-28 PROCEDURE — 83735 ASSAY OF MAGNESIUM: CPT

## 2025-07-28 PROCEDURE — 84484 ASSAY OF TROPONIN QUANT: CPT

## 2025-07-28 PROCEDURE — 87040 BLOOD CULTURE FOR BACTERIA: CPT

## 2025-07-28 PROCEDURE — 83550 IRON BINDING TEST: CPT

## 2025-07-28 PROCEDURE — 97161 PT EVAL LOW COMPLEX 20 MIN: CPT

## 2025-07-28 PROCEDURE — 83880 ASSAY OF NATRIURETIC PEPTIDE: CPT

## 2025-07-28 PROCEDURE — 84300 ASSAY OF URINE SODIUM: CPT

## 2025-07-28 PROCEDURE — 85610 PROTHROMBIN TIME: CPT

## 2025-07-28 PROCEDURE — 82570 ASSAY OF URINE CREATININE: CPT

## 2025-07-28 PROCEDURE — 86850 RBC ANTIBODY SCREEN: CPT

## 2025-07-28 PROCEDURE — 85027 COMPLETE CBC AUTOMATED: CPT

## 2025-07-28 PROCEDURE — 83605 ASSAY OF LACTIC ACID: CPT

## 2025-07-28 RX ORDER — LEVOTHYROXINE SODIUM 300 MCG
1 TABLET ORAL
Refills: 0 | DISCHARGE

## 2025-07-28 RX ORDER — FUROSEMIDE 10 MG/ML
1 INJECTION INTRAMUSCULAR; INTRAVENOUS
Refills: 0 | DISCHARGE

## 2025-07-28 RX ORDER — MIDODRINE HYDROCHLORIDE 5 MG/1
1 TABLET ORAL
Refills: 0 | DISCHARGE

## 2025-07-28 RX ADMIN — Medication 125 MICROGRAM(S): at 05:15

## 2025-07-28 RX ADMIN — MIDODRINE HYDROCHLORIDE 5 MILLIGRAM(S): 5 TABLET ORAL at 11:18

## 2025-07-28 RX ADMIN — Medication 650 MILLIGRAM(S): at 03:55

## 2025-07-28 RX ADMIN — Medication 40 MILLIGRAM(S): at 05:15

## 2025-07-28 RX ADMIN — MIDODRINE HYDROCHLORIDE 5 MILLIGRAM(S): 5 TABLET ORAL at 17:20

## 2025-07-28 RX ADMIN — Medication 2000 UNIT(S): at 11:18

## 2025-07-28 RX ADMIN — Medication 20 MILLIGRAM(S): at 11:17

## 2025-07-28 RX ADMIN — MIDODRINE HYDROCHLORIDE 5 MILLIGRAM(S): 5 TABLET ORAL at 06:20

## 2025-07-28 RX ADMIN — AMIODARONE HYDROCHLORIDE 100 MILLIGRAM(S): 50 INJECTION, SOLUTION INTRAVENOUS at 06:20

## 2025-07-28 RX ADMIN — Medication 40 MILLIGRAM(S): at 17:20

## 2025-07-29 ENCOUNTER — NON-APPOINTMENT (OUTPATIENT)
Age: 89
End: 2025-07-29

## 2025-07-29 ENCOUNTER — APPOINTMENT (OUTPATIENT)
Dept: CARDIOLOGY | Facility: CLINIC | Age: 89
End: 2025-07-29

## 2025-07-29 LAB
ANION GAP SERPL CALC-SCNC: 15 MMOL/L — SIGNIFICANT CHANGE UP (ref 5–17)
BASOPHILS # BLD AUTO: 0.05 K/UL — SIGNIFICANT CHANGE UP (ref 0–0.2)
BASOPHILS NFR BLD AUTO: 0.9 % — SIGNIFICANT CHANGE UP (ref 0–2)
BUN SERPL-MCNC: 33 MG/DL — HIGH (ref 7–23)
CALCIUM SERPL-MCNC: 8.8 MG/DL — SIGNIFICANT CHANGE UP (ref 8.4–10.5)
CHLORIDE SERPL-SCNC: 102 MMOL/L — SIGNIFICANT CHANGE UP (ref 96–108)
CO2 SERPL-SCNC: 21 MMOL/L — LOW (ref 22–31)
CREAT SERPL-MCNC: 1.82 MG/DL — HIGH (ref 0.5–1.3)
EGFR: 26 ML/MIN/1.73M2 — LOW
EGFR: 26 ML/MIN/1.73M2 — LOW
EOSINOPHIL # BLD AUTO: 0.18 K/UL — SIGNIFICANT CHANGE UP (ref 0–0.5)
EOSINOPHIL NFR BLD AUTO: 3.4 % — SIGNIFICANT CHANGE UP (ref 0–6)
GLUCOSE SERPL-MCNC: 83 MG/DL — SIGNIFICANT CHANGE UP (ref 70–99)
HCT VFR BLD CALC: 29.4 % — LOW (ref 34.5–45)
HCT VFR BLD CALC: 29.6 % — LOW (ref 34.5–45)
HCT VFR BLD CALC: 31.1 % — LOW (ref 34.5–45)
HGB BLD-MCNC: 8.8 G/DL — LOW (ref 11.5–15.5)
HGB BLD-MCNC: 8.8 G/DL — LOW (ref 11.5–15.5)
HGB BLD-MCNC: 9.3 G/DL — LOW (ref 11.5–15.5)
IMM GRANULOCYTES # BLD AUTO: 0.01 K/UL — SIGNIFICANT CHANGE UP (ref 0–0.07)
IMM GRANULOCYTES NFR BLD AUTO: 0.2 % — SIGNIFICANT CHANGE UP (ref 0–0.9)
LYMPHOCYTES # BLD AUTO: 0.89 K/UL — LOW (ref 1–3.3)
LYMPHOCYTES NFR BLD AUTO: 16.9 % — SIGNIFICANT CHANGE UP (ref 13–44)
MAGNESIUM SERPL-MCNC: 2.8 MG/DL — HIGH (ref 1.6–2.6)
MCHC RBC-ENTMCNC: 23.4 PG — LOW (ref 27–34)
MCHC RBC-ENTMCNC: 23.5 PG — LOW (ref 27–34)
MCHC RBC-ENTMCNC: 23.6 PG — LOW (ref 27–34)
MCHC RBC-ENTMCNC: 29.7 G/DL — LOW (ref 32–36)
MCHC RBC-ENTMCNC: 29.9 G/DL — LOW (ref 32–36)
MCHC RBC-ENTMCNC: 29.9 G/DL — LOW (ref 32–36)
MCV RBC AUTO: 78.2 FL — LOW (ref 80–100)
MCV RBC AUTO: 78.9 FL — LOW (ref 80–100)
MCV RBC AUTO: 78.9 FL — LOW (ref 80–100)
MONOCYTES # BLD AUTO: 0.86 K/UL — SIGNIFICANT CHANGE UP (ref 0–0.9)
MONOCYTES NFR BLD AUTO: 16.3 % — HIGH (ref 2–14)
NEUTROPHILS # BLD AUTO: 3.29 K/UL — SIGNIFICANT CHANGE UP (ref 1.8–7.4)
NEUTROPHILS NFR BLD AUTO: 62.3 % — SIGNIFICANT CHANGE UP (ref 43–77)
NRBC # BLD AUTO: 0 K/UL — SIGNIFICANT CHANGE UP (ref 0–0)
NRBC # FLD: 0 K/UL — SIGNIFICANT CHANGE UP (ref 0–0)
NRBC BLD AUTO-RTO: 0 /100 WBCS — SIGNIFICANT CHANGE UP (ref 0–0)
PHOSPHATE SERPL-MCNC: 3.8 MG/DL — SIGNIFICANT CHANGE UP (ref 2.5–4.5)
PLATELET # BLD AUTO: 194 K/UL — SIGNIFICANT CHANGE UP (ref 150–400)
PLATELET # BLD AUTO: 200 K/UL — SIGNIFICANT CHANGE UP (ref 150–400)
PLATELET # BLD AUTO: 206 K/UL — SIGNIFICANT CHANGE UP (ref 150–400)
PMV BLD: 11.5 FL — SIGNIFICANT CHANGE UP (ref 7–13)
PMV BLD: 11.9 FL — SIGNIFICANT CHANGE UP (ref 7–13)
PMV BLD: 12.3 FL — SIGNIFICANT CHANGE UP (ref 7–13)
POTASSIUM SERPL-MCNC: 3.7 MMOL/L — SIGNIFICANT CHANGE UP (ref 3.5–5.3)
POTASSIUM SERPL-SCNC: 3.7 MMOL/L — SIGNIFICANT CHANGE UP (ref 3.5–5.3)
RBC # BLD: 3.75 M/UL — LOW (ref 3.8–5.2)
RBC # BLD: 3.76 M/UL — LOW (ref 3.8–5.2)
RBC # BLD: 3.94 M/UL — SIGNIFICANT CHANGE UP (ref 3.8–5.2)
RBC # FLD: 19.7 % — HIGH (ref 10.3–14.5)
RBC # FLD: 19.9 % — HIGH (ref 10.3–14.5)
RBC # FLD: 19.9 % — HIGH (ref 10.3–14.5)
SODIUM SERPL-SCNC: 138 MMOL/L — SIGNIFICANT CHANGE UP (ref 135–145)
TROPONIN T, HIGH SENSITIVITY RESULT: 65 NG/L — HIGH (ref 0–51)
UUN UR-MCNC: 693 MG/DL — SIGNIFICANT CHANGE UP
WBC # BLD: 4.57 K/UL — SIGNIFICANT CHANGE UP (ref 3.8–10.5)
WBC # BLD: 5.28 K/UL — SIGNIFICANT CHANGE UP (ref 3.8–10.5)
WBC # BLD: 6.72 K/UL — SIGNIFICANT CHANGE UP (ref 3.8–10.5)
WBC # FLD AUTO: 4.57 K/UL — SIGNIFICANT CHANGE UP (ref 3.8–10.5)
WBC # FLD AUTO: 5.28 K/UL — SIGNIFICANT CHANGE UP (ref 3.8–10.5)
WBC # FLD AUTO: 6.72 K/UL — SIGNIFICANT CHANGE UP (ref 3.8–10.5)

## 2025-07-29 RX ORDER — APIXABAN 5 MG/1
2.5 TABLET, FILM COATED ORAL
Refills: 0 | Status: DISCONTINUED | OUTPATIENT
Start: 2025-07-29 | End: 2025-07-30

## 2025-07-29 RX ORDER — IRON SUCROSE 20 MG/ML
200 INJECTION, SOLUTION INTRAVENOUS EVERY 24 HOURS
Refills: 0 | Status: DISCONTINUED | OUTPATIENT
Start: 2025-07-29 | End: 2025-08-01

## 2025-07-29 RX ORDER — FUROSEMIDE 10 MG/ML
20 INJECTION INTRAMUSCULAR; INTRAVENOUS DAILY
Refills: 0 | Status: DISCONTINUED | OUTPATIENT
Start: 2025-07-29 | End: 2025-07-31

## 2025-07-29 RX ADMIN — Medication 40 MILLIGRAM(S): at 09:11

## 2025-07-29 RX ADMIN — Medication 650 MILLIGRAM(S): at 01:31

## 2025-07-29 RX ADMIN — MIDODRINE HYDROCHLORIDE 5 MILLIGRAM(S): 5 TABLET ORAL at 17:01

## 2025-07-29 RX ADMIN — Medication 650 MILLIGRAM(S): at 00:40

## 2025-07-29 RX ADMIN — FUROSEMIDE 20 MILLIGRAM(S): 10 INJECTION INTRAMUSCULAR; INTRAVENOUS at 17:01

## 2025-07-29 RX ADMIN — Medication 2000 UNIT(S): at 09:11

## 2025-07-29 RX ADMIN — APIXABAN 2.5 MILLIGRAM(S): 5 TABLET, FILM COATED ORAL at 17:01

## 2025-07-29 RX ADMIN — Medication 20 MILLIGRAM(S): at 09:11

## 2025-07-29 RX ADMIN — Medication 125 MICROGRAM(S): at 06:38

## 2025-07-29 RX ADMIN — MIDODRINE HYDROCHLORIDE 5 MILLIGRAM(S): 5 TABLET ORAL at 21:03

## 2025-07-29 RX ADMIN — AMIODARONE HYDROCHLORIDE 100 MILLIGRAM(S): 50 INJECTION, SOLUTION INTRAVENOUS at 09:11

## 2025-07-29 RX ADMIN — Medication 40 MILLIGRAM(S): at 17:01

## 2025-07-29 RX ADMIN — MIDODRINE HYDROCHLORIDE 5 MILLIGRAM(S): 5 TABLET ORAL at 09:11

## 2025-07-30 LAB
ALBUMIN SERPL ELPH-MCNC: 3.6 G/DL — SIGNIFICANT CHANGE UP (ref 3.3–5)
ALP SERPL-CCNC: 48 U/L — SIGNIFICANT CHANGE UP (ref 40–120)
ALT FLD-CCNC: 16 U/L — SIGNIFICANT CHANGE UP (ref 10–45)
ANION GAP SERPL CALC-SCNC: 16 MMOL/L — SIGNIFICANT CHANGE UP (ref 5–17)
AST SERPL-CCNC: 19 U/L — SIGNIFICANT CHANGE UP (ref 10–40)
BILIRUB SERPL-MCNC: 0.2 MG/DL — SIGNIFICANT CHANGE UP (ref 0.2–1.2)
BUN SERPL-MCNC: 34 MG/DL — HIGH (ref 7–23)
CALCIUM SERPL-MCNC: 8.7 MG/DL — SIGNIFICANT CHANGE UP (ref 8.4–10.5)
CHLORIDE SERPL-SCNC: 104 MMOL/L — SIGNIFICANT CHANGE UP (ref 96–108)
CO2 SERPL-SCNC: 20 MMOL/L — LOW (ref 22–31)
CREAT SERPL-MCNC: 1.5 MG/DL — HIGH (ref 0.5–1.3)
EGFR: 33 ML/MIN/1.73M2 — LOW
EGFR: 33 ML/MIN/1.73M2 — LOW
GLUCOSE SERPL-MCNC: 124 MG/DL — HIGH (ref 70–99)
HCT VFR BLD CALC: 30.1 % — LOW (ref 34.5–45)
HGB BLD-MCNC: 9.1 G/DL — LOW (ref 11.5–15.5)
MAGNESIUM SERPL-MCNC: 2.3 MG/DL — SIGNIFICANT CHANGE UP (ref 1.6–2.6)
MCHC RBC-ENTMCNC: 23.8 PG — LOW (ref 27–34)
MCHC RBC-ENTMCNC: 30.2 G/DL — LOW (ref 32–36)
MCV RBC AUTO: 78.8 FL — LOW (ref 80–100)
NRBC # BLD AUTO: 0 K/UL — SIGNIFICANT CHANGE UP (ref 0–0)
NRBC # FLD: 0 K/UL — SIGNIFICANT CHANGE UP (ref 0–0)
NRBC BLD AUTO-RTO: 0 /100 WBCS — SIGNIFICANT CHANGE UP (ref 0–0)
PLATELET # BLD AUTO: 195 K/UL — SIGNIFICANT CHANGE UP (ref 150–400)
PMV BLD: 11.9 FL — SIGNIFICANT CHANGE UP (ref 7–13)
POTASSIUM SERPL-MCNC: 3.3 MMOL/L — LOW (ref 3.5–5.3)
POTASSIUM SERPL-SCNC: 3.3 MMOL/L — LOW (ref 3.5–5.3)
PROT SERPL-MCNC: 6.4 G/DL — SIGNIFICANT CHANGE UP (ref 6–8.3)
RBC # BLD: 3.82 M/UL — SIGNIFICANT CHANGE UP (ref 3.8–5.2)
RBC # FLD: 19.9 % — HIGH (ref 10.3–14.5)
SODIUM SERPL-SCNC: 140 MMOL/L — SIGNIFICANT CHANGE UP (ref 135–145)
WBC # BLD: 7.39 K/UL — SIGNIFICANT CHANGE UP (ref 3.8–10.5)
WBC # FLD AUTO: 7.39 K/UL — SIGNIFICANT CHANGE UP (ref 3.8–10.5)

## 2025-07-30 PROCEDURE — 82435 ASSAY OF BLOOD CHLORIDE: CPT

## 2025-07-30 PROCEDURE — 82272 OCCULT BLD FECES 1-3 TESTS: CPT

## 2025-07-30 PROCEDURE — 85014 HEMATOCRIT: CPT

## 2025-07-30 PROCEDURE — 83880 ASSAY OF NATRIURETIC PEPTIDE: CPT

## 2025-07-30 PROCEDURE — 87040 BLOOD CULTURE FOR BACTERIA: CPT

## 2025-07-30 PROCEDURE — 85018 HEMOGLOBIN: CPT

## 2025-07-30 PROCEDURE — 82947 ASSAY GLUCOSE BLOOD QUANT: CPT

## 2025-07-30 PROCEDURE — 85610 PROTHROMBIN TIME: CPT

## 2025-07-30 PROCEDURE — 99233 SBSQ HOSP IP/OBS HIGH 50: CPT | Mod: GC

## 2025-07-30 PROCEDURE — 83690 ASSAY OF LIPASE: CPT

## 2025-07-30 PROCEDURE — 93005 ELECTROCARDIOGRAM TRACING: CPT

## 2025-07-30 PROCEDURE — 82330 ASSAY OF CALCIUM: CPT

## 2025-07-30 PROCEDURE — 36415 COLL VENOUS BLD VENIPUNCTURE: CPT

## 2025-07-30 PROCEDURE — 82570 ASSAY OF URINE CREATININE: CPT

## 2025-07-30 PROCEDURE — 86900 BLOOD TYPING SEROLOGIC ABO: CPT

## 2025-07-30 PROCEDURE — 85027 COMPLETE CBC AUTOMATED: CPT

## 2025-07-30 PROCEDURE — 80053 COMPREHEN METABOLIC PANEL: CPT

## 2025-07-30 PROCEDURE — 86850 RBC ANTIBODY SCREEN: CPT

## 2025-07-30 PROCEDURE — 84300 ASSAY OF URINE SODIUM: CPT

## 2025-07-30 PROCEDURE — 85025 COMPLETE CBC W/AUTO DIFF WBC: CPT

## 2025-07-30 PROCEDURE — 86901 BLOOD TYPING SEROLOGIC RH(D): CPT

## 2025-07-30 PROCEDURE — 83605 ASSAY OF LACTIC ACID: CPT

## 2025-07-30 PROCEDURE — 84100 ASSAY OF PHOSPHORUS: CPT

## 2025-07-30 PROCEDURE — 71045 X-RAY EXAM CHEST 1 VIEW: CPT | Mod: 26

## 2025-07-30 PROCEDURE — 84540 ASSAY OF URINE/UREA-N: CPT

## 2025-07-30 PROCEDURE — 83550 IRON BINDING TEST: CPT

## 2025-07-30 PROCEDURE — 93010 ELECTROCARDIOGRAM REPORT: CPT | Mod: 76

## 2025-07-30 PROCEDURE — 71045 X-RAY EXAM CHEST 1 VIEW: CPT

## 2025-07-30 PROCEDURE — 84484 ASSAY OF TROPONIN QUANT: CPT

## 2025-07-30 PROCEDURE — 84132 ASSAY OF SERUM POTASSIUM: CPT

## 2025-07-30 PROCEDURE — 97161 PT EVAL LOW COMPLEX 20 MIN: CPT

## 2025-07-30 PROCEDURE — 87637 SARSCOV2&INF A&B&RSV AMP PRB: CPT

## 2025-07-30 PROCEDURE — 84295 ASSAY OF SERUM SODIUM: CPT

## 2025-07-30 PROCEDURE — 80048 BASIC METABOLIC PNL TOTAL CA: CPT

## 2025-07-30 PROCEDURE — 83735 ASSAY OF MAGNESIUM: CPT

## 2025-07-30 PROCEDURE — 83540 ASSAY OF IRON: CPT

## 2025-07-30 PROCEDURE — 85730 THROMBOPLASTIN TIME PARTIAL: CPT

## 2025-07-30 PROCEDURE — 74177 CT ABD & PELVIS W/CONTRAST: CPT

## 2025-07-30 PROCEDURE — 85045 AUTOMATED RETICULOCYTE COUNT: CPT

## 2025-07-30 PROCEDURE — 81003 URINALYSIS AUTO W/O SCOPE: CPT

## 2025-07-30 PROCEDURE — 82803 BLOOD GASES ANY COMBINATION: CPT

## 2025-07-30 PROCEDURE — 82728 ASSAY OF FERRITIN: CPT

## 2025-07-30 RX ADMIN — MIDODRINE HYDROCHLORIDE 5 MILLIGRAM(S): 5 TABLET ORAL at 12:46

## 2025-07-30 RX ADMIN — Medication 20 MILLIGRAM(S): at 12:46

## 2025-07-30 RX ADMIN — Medication 125 MICROGRAM(S): at 05:27

## 2025-07-30 RX ADMIN — Medication 2000 UNIT(S): at 12:46

## 2025-07-30 RX ADMIN — MIDODRINE HYDROCHLORIDE 5 MILLIGRAM(S): 5 TABLET ORAL at 05:27

## 2025-07-30 RX ADMIN — FUROSEMIDE 20 MILLIGRAM(S): 10 INJECTION INTRAMUSCULAR; INTRAVENOUS at 05:27

## 2025-07-30 RX ADMIN — Medication 650 MILLIGRAM(S): at 20:54

## 2025-07-30 RX ADMIN — APIXABAN 2.5 MILLIGRAM(S): 5 TABLET, FILM COATED ORAL at 05:27

## 2025-07-30 RX ADMIN — MIDODRINE HYDROCHLORIDE 5 MILLIGRAM(S): 5 TABLET ORAL at 18:01

## 2025-07-30 RX ADMIN — Medication 650 MILLIGRAM(S): at 19:54

## 2025-07-30 RX ADMIN — Medication 40 MILLIGRAM(S): at 05:26

## 2025-07-30 RX ADMIN — Medication 40 MILLIGRAM(S): at 18:01

## 2025-07-30 RX ADMIN — AMIODARONE HYDROCHLORIDE 100 MILLIGRAM(S): 50 INJECTION, SOLUTION INTRAVENOUS at 05:28

## 2025-07-30 RX ADMIN — Medication 40 MILLIEQUIVALENT(S): at 13:06

## 2025-07-31 ENCOUNTER — TRANSCRIPTION ENCOUNTER (OUTPATIENT)
Age: 89
End: 2025-07-31

## 2025-07-31 LAB
ALBUMIN SERPL ELPH-MCNC: 3.7 G/DL — SIGNIFICANT CHANGE UP (ref 3.3–5)
ALP SERPL-CCNC: 47 U/L — SIGNIFICANT CHANGE UP (ref 40–120)
ALT FLD-CCNC: 15 U/L — SIGNIFICANT CHANGE UP (ref 10–45)
ANION GAP SERPL CALC-SCNC: 15 MMOL/L — SIGNIFICANT CHANGE UP (ref 5–17)
AST SERPL-CCNC: 18 U/L — SIGNIFICANT CHANGE UP (ref 10–40)
BILIRUB SERPL-MCNC: 0.2 MG/DL — SIGNIFICANT CHANGE UP (ref 0.2–1.2)
BLD GP AB SCN SERPL QL: NEGATIVE — SIGNIFICANT CHANGE UP
BUN SERPL-MCNC: 35 MG/DL — HIGH (ref 7–23)
CALCIUM SERPL-MCNC: 8.8 MG/DL — SIGNIFICANT CHANGE UP (ref 8.4–10.5)
CHLORIDE SERPL-SCNC: 105 MMOL/L — SIGNIFICANT CHANGE UP (ref 96–108)
CO2 SERPL-SCNC: 21 MMOL/L — LOW (ref 22–31)
CREAT SERPL-MCNC: 1.4 MG/DL — HIGH (ref 0.5–1.3)
EGFR: 36 ML/MIN/1.73M2 — LOW
EGFR: 36 ML/MIN/1.73M2 — LOW
GLUCOSE SERPL-MCNC: 93 MG/DL — SIGNIFICANT CHANGE UP (ref 70–99)
HCT VFR BLD CALC: 31.6 % — LOW (ref 34.5–45)
HGB BLD-MCNC: 9.5 G/DL — LOW (ref 11.5–15.5)
MAGNESIUM SERPL-MCNC: 2.3 MG/DL — SIGNIFICANT CHANGE UP (ref 1.6–2.6)
MCHC RBC-ENTMCNC: 23.9 PG — LOW (ref 27–34)
MCHC RBC-ENTMCNC: 30.1 G/DL — LOW (ref 32–36)
MCV RBC AUTO: 79.6 FL — LOW (ref 80–100)
NRBC # BLD AUTO: 0 K/UL — SIGNIFICANT CHANGE UP (ref 0–0)
NRBC # FLD: 0 K/UL — SIGNIFICANT CHANGE UP (ref 0–0)
NRBC BLD AUTO-RTO: 0 /100 WBCS — SIGNIFICANT CHANGE UP (ref 0–0)
NT-PROBNP SERPL-SCNC: HIGH PG/ML (ref 0–300)
PLATELET # BLD AUTO: 222 K/UL — SIGNIFICANT CHANGE UP (ref 150–400)
PMV BLD: 12.4 FL — SIGNIFICANT CHANGE UP (ref 7–13)
POTASSIUM SERPL-MCNC: 3.9 MMOL/L — SIGNIFICANT CHANGE UP (ref 3.5–5.3)
POTASSIUM SERPL-SCNC: 3.9 MMOL/L — SIGNIFICANT CHANGE UP (ref 3.5–5.3)
PROT SERPL-MCNC: 6.8 G/DL — SIGNIFICANT CHANGE UP (ref 6–8.3)
RBC # BLD: 3.97 M/UL — SIGNIFICANT CHANGE UP (ref 3.8–5.2)
RBC # FLD: 20.1 % — HIGH (ref 10.3–14.5)
RH IG SCN BLD-IMP: POSITIVE — SIGNIFICANT CHANGE UP
SODIUM SERPL-SCNC: 141 MMOL/L — SIGNIFICANT CHANGE UP (ref 135–145)
WBC # BLD: 6.34 K/UL — SIGNIFICANT CHANGE UP (ref 3.8–10.5)
WBC # FLD AUTO: 6.34 K/UL — SIGNIFICANT CHANGE UP (ref 3.8–10.5)

## 2025-07-31 RX ORDER — SENNA 187 MG
2 TABLET ORAL AT BEDTIME
Refills: 0 | Status: DISCONTINUED | OUTPATIENT
Start: 2025-07-31 | End: 2025-08-01

## 2025-07-31 RX ORDER — FUROSEMIDE 10 MG/ML
40 INJECTION INTRAMUSCULAR; INTRAVENOUS DAILY
Refills: 0 | Status: DISCONTINUED | OUTPATIENT
Start: 2025-07-31 | End: 2025-08-01

## 2025-07-31 RX ORDER — POLYETHYLENE GLYCOL 3350 17 G/17G
17 POWDER, FOR SOLUTION ORAL DAILY
Refills: 0 | Status: DISCONTINUED | OUTPATIENT
Start: 2025-07-31 | End: 2025-08-01

## 2025-07-31 RX ADMIN — Medication 40 MILLIGRAM(S): at 18:19

## 2025-07-31 RX ADMIN — FUROSEMIDE 40 MILLIGRAM(S): 10 INJECTION INTRAMUSCULAR; INTRAVENOUS at 15:01

## 2025-07-31 RX ADMIN — Medication 40 MILLIGRAM(S): at 06:08

## 2025-07-31 RX ADMIN — MIDODRINE HYDROCHLORIDE 5 MILLIGRAM(S): 5 TABLET ORAL at 12:43

## 2025-07-31 RX ADMIN — Medication 20 MILLIGRAM(S): at 12:44

## 2025-07-31 RX ADMIN — Medication 125 MICROGRAM(S): at 05:15

## 2025-07-31 RX ADMIN — POLYETHYLENE GLYCOL 3350 17 GRAM(S): 17 POWDER, FOR SOLUTION ORAL at 18:48

## 2025-07-31 RX ADMIN — FUROSEMIDE 20 MILLIGRAM(S): 10 INJECTION INTRAMUSCULAR; INTRAVENOUS at 06:07

## 2025-07-31 RX ADMIN — MIDODRINE HYDROCHLORIDE 5 MILLIGRAM(S): 5 TABLET ORAL at 18:19

## 2025-07-31 RX ADMIN — MIDODRINE HYDROCHLORIDE 5 MILLIGRAM(S): 5 TABLET ORAL at 06:07

## 2025-07-31 RX ADMIN — Medication 2000 UNIT(S): at 12:44

## 2025-07-31 RX ADMIN — AMIODARONE HYDROCHLORIDE 100 MILLIGRAM(S): 50 INJECTION, SOLUTION INTRAVENOUS at 06:07

## 2025-07-31 RX ADMIN — Medication 2 TABLET(S): at 22:13

## 2025-08-01 ENCOUNTER — TRANSCRIPTION ENCOUNTER (OUTPATIENT)
Age: 89
End: 2025-08-01

## 2025-08-01 VITALS
RESPIRATION RATE: 18 BRPM | TEMPERATURE: 98 F | OXYGEN SATURATION: 96 % | DIASTOLIC BLOOD PRESSURE: 65 MMHG | SYSTOLIC BLOOD PRESSURE: 103 MMHG | HEART RATE: 73 BPM

## 2025-08-01 LAB
ALBUMIN SERPL ELPH-MCNC: 3.8 G/DL — SIGNIFICANT CHANGE UP (ref 3.3–5)
ALP SERPL-CCNC: 51 U/L — SIGNIFICANT CHANGE UP (ref 40–120)
ALT FLD-CCNC: 15 U/L — SIGNIFICANT CHANGE UP (ref 10–45)
ANION GAP SERPL CALC-SCNC: 17 MMOL/L — SIGNIFICANT CHANGE UP (ref 5–17)
AST SERPL-CCNC: 16 U/L — SIGNIFICANT CHANGE UP (ref 10–40)
BILIRUB SERPL-MCNC: 0.4 MG/DL — SIGNIFICANT CHANGE UP (ref 0.2–1.2)
BUN SERPL-MCNC: 33 MG/DL — HIGH (ref 7–23)
CALCIUM SERPL-MCNC: 9.1 MG/DL — SIGNIFICANT CHANGE UP (ref 8.4–10.5)
CHLORIDE SERPL-SCNC: 102 MMOL/L — SIGNIFICANT CHANGE UP (ref 96–108)
CO2 SERPL-SCNC: 22 MMOL/L — SIGNIFICANT CHANGE UP (ref 22–31)
CREAT SERPL-MCNC: 1.52 MG/DL — HIGH (ref 0.5–1.3)
CULTURE RESULTS: SIGNIFICANT CHANGE UP
CULTURE RESULTS: SIGNIFICANT CHANGE UP
EGFR: 33 ML/MIN/1.73M2 — LOW
EGFR: 33 ML/MIN/1.73M2 — LOW
GLUCOSE SERPL-MCNC: 73 MG/DL — SIGNIFICANT CHANGE UP (ref 70–99)
HCT VFR BLD CALC: 33.3 % — LOW (ref 34.5–45)
HGB BLD-MCNC: 9.9 G/DL — LOW (ref 11.5–15.5)
MAGNESIUM SERPL-MCNC: 2.3 MG/DL — SIGNIFICANT CHANGE UP (ref 1.6–2.6)
MCHC RBC-ENTMCNC: 23.4 PG — LOW (ref 27–34)
MCHC RBC-ENTMCNC: 29.7 G/DL — LOW (ref 32–36)
MCV RBC AUTO: 78.7 FL — LOW (ref 80–100)
NRBC # BLD AUTO: 0 K/UL — SIGNIFICANT CHANGE UP (ref 0–0)
NRBC # FLD: 0 K/UL — SIGNIFICANT CHANGE UP (ref 0–0)
NRBC BLD AUTO-RTO: 0 /100 WBCS — SIGNIFICANT CHANGE UP (ref 0–0)
PLATELET # BLD AUTO: 225 K/UL — SIGNIFICANT CHANGE UP (ref 150–400)
PMV BLD: 11.7 FL — SIGNIFICANT CHANGE UP (ref 7–13)
POTASSIUM SERPL-MCNC: 4 MMOL/L — SIGNIFICANT CHANGE UP (ref 3.5–5.3)
POTASSIUM SERPL-SCNC: 4 MMOL/L — SIGNIFICANT CHANGE UP (ref 3.5–5.3)
PROT SERPL-MCNC: 6.9 G/DL — SIGNIFICANT CHANGE UP (ref 6–8.3)
RBC # BLD: 4.23 M/UL — SIGNIFICANT CHANGE UP (ref 3.8–5.2)
RBC # FLD: 20.2 % — HIGH (ref 10.3–14.5)
SODIUM SERPL-SCNC: 141 MMOL/L — SIGNIFICANT CHANGE UP (ref 135–145)
SPECIMEN SOURCE: SIGNIFICANT CHANGE UP
SPECIMEN SOURCE: SIGNIFICANT CHANGE UP
WBC # BLD: 6.04 K/UL — SIGNIFICANT CHANGE UP (ref 3.8–10.5)
WBC # FLD AUTO: 6.04 K/UL — SIGNIFICANT CHANGE UP (ref 3.8–10.5)

## 2025-08-01 PROCEDURE — 81003 URINALYSIS AUTO W/O SCOPE: CPT

## 2025-08-01 PROCEDURE — 85025 COMPLETE CBC W/AUTO DIFF WBC: CPT

## 2025-08-01 PROCEDURE — 97530 THERAPEUTIC ACTIVITIES: CPT

## 2025-08-01 PROCEDURE — 97161 PT EVAL LOW COMPLEX 20 MIN: CPT

## 2025-08-01 PROCEDURE — 84300 ASSAY OF URINE SODIUM: CPT

## 2025-08-01 PROCEDURE — 83540 ASSAY OF IRON: CPT

## 2025-08-01 PROCEDURE — 82435 ASSAY OF BLOOD CHLORIDE: CPT

## 2025-08-01 PROCEDURE — 85730 THROMBOPLASTIN TIME PARTIAL: CPT

## 2025-08-01 PROCEDURE — 82728 ASSAY OF FERRITIN: CPT

## 2025-08-01 PROCEDURE — 82272 OCCULT BLD FECES 1-3 TESTS: CPT

## 2025-08-01 PROCEDURE — 96375 TX/PRO/DX INJ NEW DRUG ADDON: CPT

## 2025-08-01 PROCEDURE — 85610 PROTHROMBIN TIME: CPT

## 2025-08-01 PROCEDURE — 84100 ASSAY OF PHOSPHORUS: CPT

## 2025-08-01 PROCEDURE — 86850 RBC ANTIBODY SCREEN: CPT

## 2025-08-01 PROCEDURE — 82330 ASSAY OF CALCIUM: CPT

## 2025-08-01 PROCEDURE — 93005 ELECTROCARDIOGRAM TRACING: CPT

## 2025-08-01 PROCEDURE — 84132 ASSAY OF SERUM POTASSIUM: CPT

## 2025-08-01 PROCEDURE — 80053 COMPREHEN METABOLIC PANEL: CPT

## 2025-08-01 PROCEDURE — 85045 AUTOMATED RETICULOCYTE COUNT: CPT

## 2025-08-01 PROCEDURE — 96374 THER/PROPH/DIAG INJ IV PUSH: CPT

## 2025-08-01 PROCEDURE — 84295 ASSAY OF SERUM SODIUM: CPT

## 2025-08-01 PROCEDURE — 84540 ASSAY OF URINE/UREA-N: CPT

## 2025-08-01 PROCEDURE — 83690 ASSAY OF LIPASE: CPT

## 2025-08-01 PROCEDURE — 83735 ASSAY OF MAGNESIUM: CPT

## 2025-08-01 PROCEDURE — 84484 ASSAY OF TROPONIN QUANT: CPT

## 2025-08-01 PROCEDURE — 82803 BLOOD GASES ANY COMBINATION: CPT

## 2025-08-01 PROCEDURE — 80048 BASIC METABOLIC PNL TOTAL CA: CPT

## 2025-08-01 PROCEDURE — 97116 GAIT TRAINING THERAPY: CPT

## 2025-08-01 PROCEDURE — 87637 SARSCOV2&INF A&B&RSV AMP PRB: CPT

## 2025-08-01 PROCEDURE — 99285 EMERGENCY DEPT VISIT HI MDM: CPT | Mod: 25

## 2025-08-01 PROCEDURE — 82947 ASSAY GLUCOSE BLOOD QUANT: CPT

## 2025-08-01 PROCEDURE — 71045 X-RAY EXAM CHEST 1 VIEW: CPT

## 2025-08-01 PROCEDURE — 83550 IRON BINDING TEST: CPT

## 2025-08-01 PROCEDURE — 36415 COLL VENOUS BLD VENIPUNCTURE: CPT

## 2025-08-01 PROCEDURE — 86900 BLOOD TYPING SEROLOGIC ABO: CPT

## 2025-08-01 PROCEDURE — 82570 ASSAY OF URINE CREATININE: CPT

## 2025-08-01 PROCEDURE — 74177 CT ABD & PELVIS W/CONTRAST: CPT

## 2025-08-01 PROCEDURE — 85014 HEMATOCRIT: CPT

## 2025-08-01 PROCEDURE — 86901 BLOOD TYPING SEROLOGIC RH(D): CPT

## 2025-08-01 PROCEDURE — 83880 ASSAY OF NATRIURETIC PEPTIDE: CPT

## 2025-08-01 PROCEDURE — 85027 COMPLETE CBC AUTOMATED: CPT

## 2025-08-01 PROCEDURE — 83605 ASSAY OF LACTIC ACID: CPT

## 2025-08-01 PROCEDURE — 85018 HEMOGLOBIN: CPT

## 2025-08-01 PROCEDURE — 87040 BLOOD CULTURE FOR BACTERIA: CPT

## 2025-08-01 RX ORDER — HEPARIN SODIUM 1000 [USP'U]/ML
5000 INJECTION INTRAVENOUS; SUBCUTANEOUS EVERY 8 HOURS
Refills: 0 | Status: DISCONTINUED | OUTPATIENT
Start: 2025-08-01 | End: 2025-08-01

## 2025-08-01 RX ORDER — FUROSEMIDE 10 MG/ML
40 INJECTION INTRAMUSCULAR; INTRAVENOUS DAILY
Refills: 0 | Status: DISCONTINUED | OUTPATIENT
Start: 2025-08-01 | End: 2025-08-01

## 2025-08-01 RX ADMIN — Medication 40 MILLIGRAM(S): at 05:17

## 2025-08-01 RX ADMIN — POLYETHYLENE GLYCOL 3350 17 GRAM(S): 17 POWDER, FOR SOLUTION ORAL at 09:06

## 2025-08-01 RX ADMIN — FUROSEMIDE 40 MILLIGRAM(S): 10 INJECTION INTRAMUSCULAR; INTRAVENOUS at 11:41

## 2025-08-01 RX ADMIN — AMIODARONE HYDROCHLORIDE 100 MILLIGRAM(S): 50 INJECTION, SOLUTION INTRAVENOUS at 05:17

## 2025-08-01 RX ADMIN — MIDODRINE HYDROCHLORIDE 5 MILLIGRAM(S): 5 TABLET ORAL at 11:24

## 2025-08-01 RX ADMIN — MIDODRINE HYDROCHLORIDE 5 MILLIGRAM(S): 5 TABLET ORAL at 05:17

## 2025-08-01 RX ADMIN — Medication 20 MILLIGRAM(S): at 09:06

## 2025-08-01 RX ADMIN — Medication 125 MICROGRAM(S): at 04:14

## 2025-08-01 RX ADMIN — Medication 2000 UNIT(S): at 09:06

## 2025-08-06 ENCOUNTER — APPOINTMENT (OUTPATIENT)
Dept: CARDIOLOGY | Facility: CLINIC | Age: 89
End: 2025-08-06